# Patient Record
Sex: FEMALE | Race: WHITE | NOT HISPANIC OR LATINO | Employment: OTHER | URBAN - METROPOLITAN AREA
[De-identification: names, ages, dates, MRNs, and addresses within clinical notes are randomized per-mention and may not be internally consistent; named-entity substitution may affect disease eponyms.]

---

## 2017-07-06 ENCOUNTER — TRANSCRIBE ORDERS (OUTPATIENT)
Dept: ADMINISTRATIVE | Facility: HOSPITAL | Age: 67
End: 2017-07-06

## 2017-07-06 DIAGNOSIS — R85.69: Primary | ICD-10-CM

## 2017-07-07 ENCOUNTER — HOSPITAL ENCOUNTER (OUTPATIENT)
Dept: CT IMAGING | Facility: HOSPITAL | Age: 67
Discharge: HOME | End: 2017-07-07
Payer: MEDICARE

## 2017-07-07 ENCOUNTER — APPOINTMENT (OUTPATIENT)
Dept: LAB | Facility: HOSPITAL | Age: 67
End: 2017-07-07
Payer: MEDICARE

## 2017-07-07 ENCOUNTER — TRANSCRIBE ORDERS (OUTPATIENT)
Dept: ADMINISTRATIVE | Facility: HOSPITAL | Age: 67
End: 2017-07-07

## 2017-07-07 DIAGNOSIS — K58.0 IRRITABLE BOWEL SYNDROME WITH DIARRHEA: ICD-10-CM

## 2017-07-07 DIAGNOSIS — R93.5 ABNORMAL FINDINGS ON DIAGNOSTIC IMAGING OF ABDOMEN: ICD-10-CM

## 2017-07-07 DIAGNOSIS — R85.69: ICD-10-CM

## 2017-07-07 DIAGNOSIS — K29.60 REFLUX GASTRITIS: ICD-10-CM

## 2017-07-07 DIAGNOSIS — R19.7 DIARRHEA, UNSPECIFIED TYPE: ICD-10-CM

## 2017-07-07 DIAGNOSIS — R93.5 ABNORMAL FINDINGS ON DIAGNOSTIC IMAGING OF ABDOMEN: Primary | ICD-10-CM

## 2017-07-07 LAB
ALBUMIN SERPL BCP-MCNC: 3.8 G/DL (ref 3.5–5)
ALP SERPL-CCNC: 113 U/L (ref 46–116)
ALT SERPL W P-5'-P-CCNC: 35 U/L (ref 12–78)
ANION GAP SERPL CALCULATED.3IONS-SCNC: 8 MMOL/L (ref 4–13)
AST SERPL W P-5'-P-CCNC: 23 U/L (ref 5–45)
BILIRUB SERPL-MCNC: 0.7 MG/DL (ref 0.2–1)
BUN SERPL-MCNC: 21 MG/DL (ref 5–25)
CALCIUM SERPL-MCNC: 9 MG/DL (ref 8.3–10.1)
CHLORIDE SERPL-SCNC: 104 MMOL/L (ref 100–108)
CO2 SERPL-SCNC: 27 MMOL/L (ref 21–32)
CREAT SERPL-MCNC: 0.77 MG/DL (ref 0.6–1.3)
ERYTHROCYTE [SEDIMENTATION RATE] IN BLOOD: 22 MM/HOUR (ref 0–20)
GFR SERPL CREATININE-BSD FRML MDRD: >60 ML/MIN/1.73SQ M
GLUCOSE P FAST SERPL-MCNC: 100 MG/DL (ref 65–99)
POTASSIUM SERPL-SCNC: 4.2 MMOL/L (ref 3.5–5.3)
PROT SERPL-MCNC: 7.4 G/DL (ref 6.4–8.2)
SODIUM SERPL-SCNC: 139 MMOL/L (ref 136–145)

## 2017-07-07 PROCEDURE — 80053 COMPREHEN METABOLIC PANEL: CPT

## 2017-07-07 PROCEDURE — 85652 RBC SED RATE AUTOMATED: CPT

## 2017-07-07 PROCEDURE — 36415 COLL VENOUS BLD VENIPUNCTURE: CPT

## 2017-07-07 PROCEDURE — 86255 FLUORESCENT ANTIBODY SCREEN: CPT

## 2017-07-07 PROCEDURE — 74177 CT ABD & PELVIS W/CONTRAST: CPT

## 2017-07-07 PROCEDURE — 83516 IMMUNOASSAY NONANTIBODY: CPT

## 2017-07-07 PROCEDURE — 82784 ASSAY IGA/IGD/IGG/IGM EACH: CPT

## 2017-07-07 RX ADMIN — IOHEXOL 100 ML: 350 INJECTION, SOLUTION INTRAVENOUS at 13:21

## 2017-07-07 RX ADMIN — IOHEXOL 50 ML: 240 INJECTION, SOLUTION INTRATHECAL; INTRAVASCULAR; INTRAVENOUS; ORAL at 13:21

## 2017-07-08 LAB
ENDOMYSIUM IGA SER QL: NEGATIVE
GLIADIN PEPTIDE IGA SER-ACNC: 4 UNITS (ref 0–19)
GLIADIN PEPTIDE IGG SER-ACNC: 3 UNITS (ref 0–19)
IGA SERPL-MCNC: 302 MG/DL (ref 87–352)
TTG IGA SER-ACNC: <2 U/ML (ref 0–3)
TTG IGG SER-ACNC: <2 U/ML (ref 0–5)

## 2017-11-15 ENCOUNTER — TRANSCRIBE ORDERS (OUTPATIENT)
Dept: ADMINISTRATIVE | Facility: HOSPITAL | Age: 67
End: 2017-11-15

## 2017-11-15 ENCOUNTER — HOSPITAL ENCOUNTER (OUTPATIENT)
Dept: RADIOLOGY | Facility: HOSPITAL | Age: 67
Discharge: HOME | End: 2017-11-15
Payer: MEDICARE

## 2017-11-15 ENCOUNTER — APPOINTMENT (OUTPATIENT)
Dept: LAB | Facility: HOSPITAL | Age: 67
End: 2017-11-15
Payer: MEDICARE

## 2017-11-15 DIAGNOSIS — R07.9 CHEST PAIN, UNSPECIFIED TYPE: Primary | ICD-10-CM

## 2017-11-15 DIAGNOSIS — Z13.220 SCREENING FOR LIPOID DISORDERS: ICD-10-CM

## 2017-11-15 DIAGNOSIS — R07.9 CHEST PAIN, UNSPECIFIED TYPE: ICD-10-CM

## 2017-11-15 DIAGNOSIS — Z72.89 OTHER PROBLEMS RELATED TO LIFESTYLE: ICD-10-CM

## 2017-11-15 LAB
ALBUMIN SERPL BCP-MCNC: 3.7 G/DL (ref 3.5–5)
ALP SERPL-CCNC: 99 U/L (ref 46–116)
ALT SERPL W P-5'-P-CCNC: 36 U/L (ref 12–78)
ANION GAP SERPL CALCULATED.3IONS-SCNC: 8 MMOL/L (ref 4–13)
AST SERPL W P-5'-P-CCNC: 19 U/L (ref 5–45)
BILIRUB SERPL-MCNC: 0.6 MG/DL (ref 0.2–1)
BUN SERPL-MCNC: 20 MG/DL (ref 5–25)
CALCIUM SERPL-MCNC: 9.3 MG/DL (ref 8.3–10.1)
CHLORIDE SERPL-SCNC: 105 MMOL/L (ref 100–108)
CHOLEST SERPL-MCNC: 165 MG/DL (ref 50–200)
CO2 SERPL-SCNC: 29 MMOL/L (ref 21–32)
CREAT SERPL-MCNC: 0.69 MG/DL (ref 0.6–1.3)
ERYTHROCYTE [DISTWIDTH] IN BLOOD BY AUTOMATED COUNT: 12.6 % (ref 11.6–15.1)
GFR SERPL CREATININE-BSD FRML MDRD: 92 ML/MIN/1.73SQ M
GLUCOSE P FAST SERPL-MCNC: 102 MG/DL (ref 65–99)
HCT VFR BLD AUTO: 42.3 % (ref 34.8–46.1)
HDLC SERPL-MCNC: 68 MG/DL (ref 40–60)
HGB BLD-MCNC: 14 G/DL (ref 11.5–15.4)
LDLC SERPL CALC-MCNC: 85 MG/DL (ref 0–100)
MCH RBC QN AUTO: 30.4 PG (ref 26.8–34.3)
MCHC RBC AUTO-ENTMCNC: 33.1 G/DL (ref 31.4–37.4)
MCV RBC AUTO: 92 FL (ref 82–98)
PLATELET # BLD AUTO: 225 THOUSANDS/UL (ref 149–390)
PMV BLD AUTO: 11.2 FL (ref 8.9–12.7)
POTASSIUM SERPL-SCNC: 4.2 MMOL/L (ref 3.5–5.3)
PROT SERPL-MCNC: 7 G/DL (ref 6.4–8.2)
RBC # BLD AUTO: 4.6 MILLION/UL (ref 3.81–5.12)
SODIUM SERPL-SCNC: 142 MMOL/L (ref 136–145)
TRIGL SERPL-MCNC: 58 MG/DL
WBC # BLD AUTO: 4.92 THOUSAND/UL (ref 4.31–10.16)

## 2017-11-15 PROCEDURE — 80053 COMPREHEN METABOLIC PANEL: CPT

## 2017-11-15 PROCEDURE — 80061 LIPID PANEL: CPT

## 2017-11-15 PROCEDURE — 85027 COMPLETE CBC AUTOMATED: CPT

## 2017-11-15 PROCEDURE — 71020 HB CHEST X-RAY 2VW FRONTAL&LATL: CPT

## 2017-11-15 PROCEDURE — 36415 COLL VENOUS BLD VENIPUNCTURE: CPT

## 2017-11-15 PROCEDURE — 80074 ACUTE HEPATITIS PANEL: CPT

## 2017-11-16 LAB
HAV IGM SER QL: NORMAL
HBV CORE IGM SER QL: NORMAL
HBV SURFACE AG SER QL: NORMAL
HCV AB SER QL: NORMAL

## 2018-03-22 ENCOUNTER — APPOINTMENT (OUTPATIENT)
Dept: RADIOLOGY | Facility: CLINIC | Age: 68
End: 2018-03-22
Payer: MEDICARE

## 2018-03-22 ENCOUNTER — OFFICE VISIT (OUTPATIENT)
Dept: URGENT CARE | Facility: CLINIC | Age: 68
End: 2018-03-22
Payer: MEDICARE

## 2018-03-22 VITALS — OXYGEN SATURATION: 98 % | HEART RATE: 88 BPM | RESPIRATION RATE: 20 BRPM | TEMPERATURE: 98.6 F

## 2018-03-22 DIAGNOSIS — S93.401A SPRAIN OF RIGHT ANKLE, UNSPECIFIED LIGAMENT, INITIAL ENCOUNTER: Primary | ICD-10-CM

## 2018-03-22 DIAGNOSIS — S99.911A INJURY, ANKLE, RIGHT, INITIAL ENCOUNTER: ICD-10-CM

## 2018-03-22 PROCEDURE — G0463 HOSPITAL OUTPT CLINIC VISIT: HCPCS | Performed by: PHYSICIAN ASSISTANT

## 2018-03-22 PROCEDURE — 99213 OFFICE O/P EST LOW 20 MIN: CPT | Performed by: PHYSICIAN ASSISTANT

## 2018-03-22 PROCEDURE — 73610 X-RAY EXAM OF ANKLE: CPT

## 2018-03-22 RX ORDER — ASPIRIN 325 MG
325 TABLET ORAL
COMMUNITY
Start: 2009-10-22 | End: 2021-11-22 | Stop reason: HOSPADM

## 2018-03-22 RX ORDER — BIMATOPROST 0.01 %
DROPS OPHTHALMIC (EYE)
COMMUNITY
Start: 2018-03-12

## 2018-03-22 NOTE — PATIENT INSTRUCTIONS
Xray appears negative for any fracture  Will follow up with radiologist report when available  Recommend elevating body part, icing the area every 2 hours for 20-30 minutes, take Ibuprofen every 6-8 hours to reduce inflammation with food  Patient to follow up ortho in 2-3 days if no improvement with the addition of ice/NSAIDS with food  Patient did verbalize understanding  I did review the radiologist report with patient when it was made available  Discussed that she will need repeat imaging again to rule out fracture  To present to the ER if symptoms worsen

## 2018-03-22 NOTE — PROGRESS NOTES
[address] [facility]  ([facility] [phone]  (fax) [phone]        NAME: Yan Griffin is a 74 y o  female  : 1950    MRN: [id number]  DATE: 2018  TIME: 3:09 PM    Assessment and Plan   Sprain of right ankle, unspecified ligament, initial encounter [S93 401A]  1  Sprain of right ankle, unspecified ligament, initial encounter     2  Injury, ankle, right, initial encounter  XR ankle 3+ vw right         Patient Instructions   Xray appears negative for any fracture  Will follow up with radiologist report when available  Recommend elevating body part, icing the area every 2 hours for 20-30 minutes, take Ibuprofen every 6-8 hours to reduce inflammation with food  Patient to follow up ortho in 2-3 days if no improvement with the addition of ice/NSAIDS with food  Patient did verbalize understanding  I did review the radiologist report with patient when it was made available  Discussed that she will need repeat imaging again to rule out fracture  Given numbers of several orthopedic doctors  To present to the ER if symptoms worsen  Chief Complaint     Chief Complaint   Patient presents with    Ankle Injury     Vernard Fix down steps 2 weeks ago c/o right ankle pain and swelling  Bijan Villarreal LPN          History of Present Illness   Yan Griffin presents to the clinic c/o    Ankle Injury    The incident occurred more than 1 week ago  The incident occurred at home  The injury mechanism was a fall  The pain is present in the right ankle  The pain is moderate  Pertinent negatives include no muscle weakness, numbness or tingling  She reports no foreign bodies present  The symptoms are aggravated by movement  Review of Systems   Review of Systems   Constitutional: Negative for activity change, appetite change, chills, diaphoresis, fatigue and fever     HENT: Negative for congestion, ear discharge, ear pain, facial swelling, rhinorrhea, sinus pain, sinus pressure, sneezing and sore throat  Eyes: Negative for photophobia, pain, discharge, redness, itching and visual disturbance  Respiratory: Negative for apnea, cough, chest tightness, shortness of breath and wheezing  Cardiovascular: Negative for chest pain  Gastrointestinal: Negative for abdominal distention, abdominal pain, anal bleeding, blood in stool, constipation, diarrhea, nausea and vomiting  Genitourinary: Negative for dysuria, flank pain, frequency, hematuria and urgency  Musculoskeletal: Positive for arthralgias and joint swelling  Negative for back pain, gait problem, myalgias, neck pain and neck stiffness  Skin: Negative for color change, rash and wound  Allergic/Immunologic: Negative for immunocompromised state  Neurological: Negative for dizziness, tingling, facial asymmetry, numbness and headaches  Hematological: Negative for adenopathy  Psychiatric/Behavioral: Negative for confusion and decreased concentration           Current Medications     Long-Term Prescriptions   Medication Sig Dispense Refill    DEXILANT 60 MG capsule       LUMIGAN 0 01 % ophthalmic drops          Current Allergies     Allergies as of 03/22/2018 - Reviewed 03/22/2018   Allergen Reaction Noted    Percocet [oxycodone-acetaminophen] GI Intolerance 07/20/2016    Percocet [oxycodone-acetaminophen] GI Intolerance 03/22/2018    Sulfa antibiotics  07/20/2016            The following portions of the patient's history were reviewed and updated as appropriate: allergies, current medications, past family history, past medical history, past social history, past surgical history and problem list     Past Medical History:   Diagnosis Date    Encephalitis     History of blood clot in brain      Past Surgical History:   Procedure Laterality Date    CHOLECYSTECTOMY      CYST REMOVAL Right     wrist    HEMORROIDECTOMY       Objective   Pulse 88   Temp 98 6 °F (37 °C)   Resp 20   SpO2 98%        Physical Exam     Physical Exam   Constitutional: She is oriented to person, place, and time  She appears well-developed and well-nourished  No distress  HENT:   Head: Normocephalic and atraumatic  Right Ear: Tympanic membrane and external ear normal    Left Ear: Tympanic membrane and external ear normal    Nose: Nose normal    Mouth/Throat: Oropharynx is clear and moist  No oropharyngeal exudate  Eyes: Conjunctivae and EOM are normal  Pupils are equal, round, and reactive to light  Right eye exhibits no discharge  Left eye exhibits no discharge  No scleral icterus  Neck: Normal range of motion  Neck supple  No JVD present  No tracheal deviation present  No thyromegaly present  Cardiovascular: Normal rate, regular rhythm and normal heart sounds  Exam reveals no gallop and no friction rub  No murmur heard  Pulmonary/Chest: Effort normal and breath sounds normal  No stridor  No respiratory distress  She has no wheezes  She has no rales  She exhibits no tenderness  Abdominal: Soft  Bowel sounds are normal  She exhibits no distension and no mass  There is no tenderness  There is no rebound and no guarding  Musculoskeletal: She exhibits edema and tenderness  She exhibits no deformity  Right foot: There is tenderness (right lateral malleolus ) and swelling (right lateral ankle)  There is no deformity and no laceration  Neurovascularly intact  Lymphadenopathy:     She has no cervical adenopathy  Neurological: She is alert and oriented to person, place, and time  She has normal reflexes  Coordination normal    Skin: Skin is warm and dry  No rash noted  She is not diaphoretic  No erythema  No pallor  Psychiatric: She has a normal mood and affect  Her behavior is normal  Judgment and thought content normal    Nursing note and vitals reviewed        Delmy Merrill PA-C

## 2019-02-01 ENCOUNTER — TRANSCRIBE ORDERS (OUTPATIENT)
Dept: ADMINISTRATIVE | Facility: HOSPITAL | Age: 69
End: 2019-02-01

## 2019-02-01 ENCOUNTER — APPOINTMENT (OUTPATIENT)
Dept: LAB | Facility: HOSPITAL | Age: 69
End: 2019-02-01

## 2019-02-01 DIAGNOSIS — Z01.84 IMMUNITY STATUS TESTING: ICD-10-CM

## 2019-02-01 DIAGNOSIS — Z01.84 IMMUNITY STATUS TESTING: Primary | ICD-10-CM

## 2019-02-01 LAB — RUBV IGG SERPL IA-ACNC: >175 IU/ML

## 2019-02-01 PROCEDURE — 86787 VARICELLA-ZOSTER ANTIBODY: CPT

## 2019-02-01 PROCEDURE — 36415 COLL VENOUS BLD VENIPUNCTURE: CPT

## 2019-02-01 PROCEDURE — 86765 RUBEOLA ANTIBODY: CPT

## 2019-02-01 PROCEDURE — 86480 TB TEST CELL IMMUN MEASURE: CPT

## 2019-02-01 PROCEDURE — 86735 MUMPS ANTIBODY: CPT

## 2019-02-01 PROCEDURE — 86762 RUBELLA ANTIBODY: CPT

## 2019-02-04 LAB
GAMMA INTERFERON BACKGROUND BLD IA-ACNC: 0.04 IU/ML
M TB IFN-G BLD-IMP: NEGATIVE
M TB IFN-G CD4+ BCKGRND COR BLD-ACNC: -0.01 IU/ML
M TB IFN-G CD4+ BCKGRND COR BLD-ACNC: -0.01 IU/ML
MITOGEN IGNF BCKGRD COR BLD-ACNC: >10 IU/ML
VZV IGG SER IA-ACNC: NORMAL

## 2019-02-05 LAB
MEV IGG SER QL: NORMAL
MUV IGG SER QL: NORMAL

## 2020-01-10 ENCOUNTER — APPOINTMENT (OUTPATIENT)
Dept: LAB | Facility: CLINIC | Age: 70
End: 2020-01-10
Payer: MEDICARE

## 2020-01-10 ENCOUNTER — TRANSCRIBE ORDERS (OUTPATIENT)
Dept: LAB | Facility: CLINIC | Age: 70
End: 2020-01-10

## 2020-01-10 DIAGNOSIS — Z13.9 SCREENING FOR UNSPECIFIED CONDITION: Primary | ICD-10-CM

## 2020-01-10 DIAGNOSIS — Z13.220 SCREENING FOR LIPOID DISORDERS: ICD-10-CM

## 2020-01-10 DIAGNOSIS — Z13.9 SCREENING FOR UNSPECIFIED CONDITION: ICD-10-CM

## 2020-01-10 LAB
ALBUMIN SERPL BCP-MCNC: 3.7 G/DL (ref 3.5–5)
ALP SERPL-CCNC: 98 U/L (ref 46–116)
ALT SERPL W P-5'-P-CCNC: 27 U/L (ref 12–78)
ANION GAP SERPL CALCULATED.3IONS-SCNC: 5 MMOL/L (ref 4–13)
AST SERPL W P-5'-P-CCNC: 15 U/L (ref 5–45)
BILIRUB SERPL-MCNC: 0.9 MG/DL (ref 0.2–1)
BUN SERPL-MCNC: 21 MG/DL (ref 5–25)
CALCIUM SERPL-MCNC: 9 MG/DL (ref 8.3–10.1)
CHLORIDE SERPL-SCNC: 108 MMOL/L (ref 100–108)
CHOLEST SERPL-MCNC: 176 MG/DL (ref 50–200)
CO2 SERPL-SCNC: 28 MMOL/L (ref 21–32)
CREAT SERPL-MCNC: 0.86 MG/DL (ref 0.6–1.3)
ERYTHROCYTE [DISTWIDTH] IN BLOOD BY AUTOMATED COUNT: 12.4 % (ref 11.6–15.1)
GFR SERPL CREATININE-BSD FRML MDRD: 70 ML/MIN/1.73SQ M
GLUCOSE P FAST SERPL-MCNC: 114 MG/DL (ref 65–99)
HCT VFR BLD AUTO: 42.4 % (ref 34.8–46.1)
HDLC SERPL-MCNC: 69 MG/DL
HGB BLD-MCNC: 14 G/DL (ref 11.5–15.4)
LDLC SERPL CALC-MCNC: 92 MG/DL (ref 0–100)
MCH RBC QN AUTO: 31.2 PG (ref 26.8–34.3)
MCHC RBC AUTO-ENTMCNC: 33 G/DL (ref 31.4–37.4)
MCV RBC AUTO: 94 FL (ref 82–98)
NONHDLC SERPL-MCNC: 107 MG/DL
PLATELET # BLD AUTO: 232 THOUSANDS/UL (ref 149–390)
PMV BLD AUTO: 11.6 FL (ref 8.9–12.7)
POTASSIUM SERPL-SCNC: 3.9 MMOL/L (ref 3.5–5.3)
PROT SERPL-MCNC: 7.4 G/DL (ref 6.4–8.2)
RBC # BLD AUTO: 4.49 MILLION/UL (ref 3.81–5.12)
SODIUM SERPL-SCNC: 141 MMOL/L (ref 136–145)
TRIGL SERPL-MCNC: 77 MG/DL
WBC # BLD AUTO: 5.85 THOUSAND/UL (ref 4.31–10.16)

## 2020-01-10 PROCEDURE — 85027 COMPLETE CBC AUTOMATED: CPT

## 2020-01-10 PROCEDURE — 36415 COLL VENOUS BLD VENIPUNCTURE: CPT

## 2020-01-10 PROCEDURE — 80061 LIPID PANEL: CPT

## 2020-01-10 PROCEDURE — 80053 COMPREHEN METABOLIC PANEL: CPT

## 2021-02-23 ENCOUNTER — TRANSCRIBE ORDERS (OUTPATIENT)
Dept: ADMINISTRATIVE | Facility: HOSPITAL | Age: 71
End: 2021-02-23

## 2021-02-23 ENCOUNTER — LAB (OUTPATIENT)
Dept: LAB | Facility: HOSPITAL | Age: 71
End: 2021-02-23
Attending: FAMILY MEDICINE
Payer: MEDICARE

## 2021-02-23 DIAGNOSIS — E11.9 DIABETES MELLITUS WITHOUT COMPLICATION (HCC): ICD-10-CM

## 2021-02-23 DIAGNOSIS — Z13.220 SCREENING FOR LIPOID DISORDERS: ICD-10-CM

## 2021-02-23 DIAGNOSIS — E11.9 DIABETES MELLITUS WITHOUT COMPLICATION (HCC): Primary | ICD-10-CM

## 2021-02-23 LAB
ALBUMIN SERPL BCP-MCNC: 3.6 G/DL (ref 3.5–5)
ALP SERPL-CCNC: 107 U/L (ref 46–116)
ALT SERPL W P-5'-P-CCNC: 28 U/L (ref 12–78)
ANION GAP SERPL CALCULATED.3IONS-SCNC: 6 MMOL/L (ref 4–13)
AST SERPL W P-5'-P-CCNC: 17 U/L (ref 5–45)
BILIRUB SERPL-MCNC: 0.8 MG/DL (ref 0.2–1)
BUN SERPL-MCNC: 16 MG/DL (ref 5–25)
CALCIUM SERPL-MCNC: 9.6 MG/DL (ref 8.3–10.1)
CHLORIDE SERPL-SCNC: 105 MMOL/L (ref 100–108)
CHOLEST SERPL-MCNC: 183 MG/DL (ref 50–200)
CO2 SERPL-SCNC: 29 MMOL/L (ref 21–32)
CREAT SERPL-MCNC: 0.79 MG/DL (ref 0.6–1.3)
ERYTHROCYTE [DISTWIDTH] IN BLOOD BY AUTOMATED COUNT: 12.4 % (ref 11.6–15.1)
GFR SERPL CREATININE-BSD FRML MDRD: 77 ML/MIN/1.73SQ M
GLUCOSE P FAST SERPL-MCNC: 108 MG/DL (ref 65–99)
HCT VFR BLD AUTO: 42.6 % (ref 34.8–46.1)
HDLC SERPL-MCNC: 78 MG/DL
HGB BLD-MCNC: 14 G/DL (ref 11.5–15.4)
LDLC SERPL CALC-MCNC: 92 MG/DL (ref 0–100)
MAGNESIUM SERPL-MCNC: 2.1 MG/DL (ref 1.6–2.6)
MCH RBC QN AUTO: 30.8 PG (ref 26.8–34.3)
MCHC RBC AUTO-ENTMCNC: 32.9 G/DL (ref 31.4–37.4)
MCV RBC AUTO: 94 FL (ref 82–98)
NONHDLC SERPL-MCNC: 105 MG/DL
PLATELET # BLD AUTO: 230 THOUSANDS/UL (ref 149–390)
PMV BLD AUTO: 10.7 FL (ref 8.9–12.7)
POTASSIUM SERPL-SCNC: 4.3 MMOL/L (ref 3.5–5.3)
PROT SERPL-MCNC: 7.6 G/DL (ref 6.4–8.2)
RBC # BLD AUTO: 4.55 MILLION/UL (ref 3.81–5.12)
SODIUM SERPL-SCNC: 140 MMOL/L (ref 136–145)
TRIGL SERPL-MCNC: 64 MG/DL
WBC # BLD AUTO: 5.21 THOUSAND/UL (ref 4.31–10.16)

## 2021-02-23 PROCEDURE — 80061 LIPID PANEL: CPT

## 2021-02-23 PROCEDURE — 80053 COMPREHEN METABOLIC PANEL: CPT

## 2021-02-23 PROCEDURE — 36415 COLL VENOUS BLD VENIPUNCTURE: CPT

## 2021-02-23 PROCEDURE — 83735 ASSAY OF MAGNESIUM: CPT

## 2021-02-23 PROCEDURE — 85027 COMPLETE CBC AUTOMATED: CPT

## 2021-03-12 ENCOUNTER — IMMUNIZATIONS (OUTPATIENT)
Dept: FAMILY MEDICINE CLINIC | Facility: HOSPITAL | Age: 71
End: 2021-03-12

## 2021-03-12 DIAGNOSIS — Z23 ENCOUNTER FOR IMMUNIZATION: Primary | ICD-10-CM

## 2021-03-12 PROCEDURE — 91301 SARS-COV-2 / COVID-19 MRNA VACCINE (MODERNA) 100 MCG: CPT

## 2021-03-12 PROCEDURE — 0011A SARS-COV-2 / COVID-19 MRNA VACCINE (MODERNA) 100 MCG: CPT

## 2021-04-14 ENCOUNTER — IMMUNIZATIONS (OUTPATIENT)
Dept: FAMILY MEDICINE CLINIC | Facility: HOSPITAL | Age: 71
End: 2021-04-14

## 2021-04-14 DIAGNOSIS — Z23 ENCOUNTER FOR IMMUNIZATION: Primary | ICD-10-CM

## 2021-04-14 PROCEDURE — 91301 SARS-COV-2 / COVID-19 MRNA VACCINE (MODERNA) 100 MCG: CPT

## 2021-04-14 PROCEDURE — 0012A SARS-COV-2 / COVID-19 MRNA VACCINE (MODERNA) 100 MCG: CPT

## 2021-10-12 ENCOUNTER — APPOINTMENT (OUTPATIENT)
Dept: LAB | Facility: CLINIC | Age: 71
End: 2021-10-12
Payer: MEDICARE

## 2021-10-12 DIAGNOSIS — Z13.220 SCREENING FOR LIPOID DISORDERS: ICD-10-CM

## 2021-10-12 DIAGNOSIS — K21.9 GASTROESOPHAGEAL REFLUX DISEASE WITHOUT ESOPHAGITIS: ICD-10-CM

## 2021-10-12 LAB
ALBUMIN SERPL BCP-MCNC: 3.4 G/DL (ref 3.5–5)
ALP SERPL-CCNC: 106 U/L (ref 46–116)
ALT SERPL W P-5'-P-CCNC: 28 U/L (ref 12–78)
ANION GAP SERPL CALCULATED.3IONS-SCNC: 6 MMOL/L (ref 4–13)
AST SERPL W P-5'-P-CCNC: 19 U/L (ref 5–45)
BILIRUB SERPL-MCNC: 0.6 MG/DL (ref 0.2–1)
BUN SERPL-MCNC: 20 MG/DL (ref 5–25)
CALCIUM ALBUM COR SERPL-MCNC: 9.9 MG/DL (ref 8.3–10.1)
CALCIUM SERPL-MCNC: 9.4 MG/DL (ref 8.3–10.1)
CHLORIDE SERPL-SCNC: 106 MMOL/L (ref 100–108)
CHOLEST SERPL-MCNC: 173 MG/DL (ref 50–200)
CO2 SERPL-SCNC: 26 MMOL/L (ref 21–32)
CREAT SERPL-MCNC: 0.78 MG/DL (ref 0.6–1.3)
ERYTHROCYTE [DISTWIDTH] IN BLOOD BY AUTOMATED COUNT: 12.8 % (ref 11.6–15.1)
GFR SERPL CREATININE-BSD FRML MDRD: 78 ML/MIN/1.73SQ M
GLUCOSE P FAST SERPL-MCNC: 102 MG/DL (ref 65–99)
HCT VFR BLD AUTO: 43 % (ref 34.8–46.1)
HDLC SERPL-MCNC: 70 MG/DL
HGB BLD-MCNC: 14.1 G/DL (ref 11.5–15.4)
LDLC SERPL CALC-MCNC: 89 MG/DL (ref 0–100)
MAGNESIUM SERPL-MCNC: 2.3 MG/DL (ref 1.6–2.6)
MCH RBC QN AUTO: 31.4 PG (ref 26.8–34.3)
MCHC RBC AUTO-ENTMCNC: 32.8 G/DL (ref 31.4–37.4)
MCV RBC AUTO: 96 FL (ref 82–98)
NONHDLC SERPL-MCNC: 103 MG/DL
PLATELET # BLD AUTO: 236 THOUSANDS/UL (ref 149–390)
PMV BLD AUTO: 11.7 FL (ref 8.9–12.7)
POTASSIUM SERPL-SCNC: 3.8 MMOL/L (ref 3.5–5.3)
PROT SERPL-MCNC: 7.4 G/DL (ref 6.4–8.2)
RBC # BLD AUTO: 4.49 MILLION/UL (ref 3.81–5.12)
SODIUM SERPL-SCNC: 138 MMOL/L (ref 136–145)
TRIGL SERPL-MCNC: 72 MG/DL
WBC # BLD AUTO: 6.4 THOUSAND/UL (ref 4.31–10.16)

## 2021-10-12 PROCEDURE — 83735 ASSAY OF MAGNESIUM: CPT

## 2021-10-12 PROCEDURE — 36415 COLL VENOUS BLD VENIPUNCTURE: CPT

## 2021-10-12 PROCEDURE — 80053 COMPREHEN METABOLIC PANEL: CPT

## 2021-10-12 PROCEDURE — 85027 COMPLETE CBC AUTOMATED: CPT

## 2021-10-12 PROCEDURE — 80061 LIPID PANEL: CPT

## 2021-11-20 ENCOUNTER — APPOINTMENT (OUTPATIENT)
Dept: RADIOLOGY | Facility: CLINIC | Age: 71
End: 2021-11-20
Payer: MEDICARE

## 2021-11-20 ENCOUNTER — OFFICE VISIT (OUTPATIENT)
Dept: URGENT CARE | Facility: CLINIC | Age: 71
End: 2021-11-20
Payer: MEDICARE

## 2021-11-20 VITALS
WEIGHT: 215 LBS | HEIGHT: 64 IN | RESPIRATION RATE: 18 BRPM | DIASTOLIC BLOOD PRESSURE: 66 MMHG | SYSTOLIC BLOOD PRESSURE: 143 MMHG | TEMPERATURE: 97.8 F | OXYGEN SATURATION: 96 % | HEART RATE: 103 BPM | BODY MASS INDEX: 36.7 KG/M2

## 2021-11-20 DIAGNOSIS — R09.1 PLEURISY: Primary | ICD-10-CM

## 2021-11-20 DIAGNOSIS — R05.9 COUGH: ICD-10-CM

## 2021-11-20 DIAGNOSIS — M54.50 ACUTE BILATERAL LOW BACK PAIN WITHOUT SCIATICA: ICD-10-CM

## 2021-11-20 DIAGNOSIS — M79.10 MYALGIA: ICD-10-CM

## 2021-11-20 PROCEDURE — G0463 HOSPITAL OUTPT CLINIC VISIT: HCPCS | Performed by: PHYSICIAN ASSISTANT

## 2021-11-20 PROCEDURE — U0003 INFECTIOUS AGENT DETECTION BY NUCLEIC ACID (DNA OR RNA); SEVERE ACUTE RESPIRATORY SYNDROME CORONAVIRUS 2 (SARS-COV-2) (CORONAVIRUS DISEASE [COVID-19]), AMPLIFIED PROBE TECHNIQUE, MAKING USE OF HIGH THROUGHPUT TECHNOLOGIES AS DESCRIBED BY CMS-2020-01-R: HCPCS | Performed by: PHYSICIAN ASSISTANT

## 2021-11-20 PROCEDURE — 99213 OFFICE O/P EST LOW 20 MIN: CPT | Performed by: PHYSICIAN ASSISTANT

## 2021-11-20 PROCEDURE — 71046 X-RAY EXAM CHEST 2 VIEWS: CPT

## 2021-11-20 PROCEDURE — 93005 ELECTROCARDIOGRAM TRACING: CPT | Performed by: PHYSICIAN ASSISTANT

## 2021-11-20 PROCEDURE — U0005 INFEC AGEN DETEC AMPLI PROBE: HCPCS | Performed by: PHYSICIAN ASSISTANT

## 2021-11-20 RX ORDER — METHOCARBAMOL 500 MG/1
500 TABLET, FILM COATED ORAL 3 TIMES DAILY
Qty: 30 TABLET | Refills: 0 | Status: SHIPPED | OUTPATIENT
Start: 2021-11-20 | End: 2021-11-20

## 2021-11-20 RX ORDER — METHOCARBAMOL 500 MG/1
TABLET, FILM COATED ORAL
Qty: 30 TABLET | Refills: 0 | Status: SHIPPED | OUTPATIENT
Start: 2021-11-20 | End: 2021-11-20

## 2021-11-20 RX ORDER — CYCLOBENZAPRINE HCL 5 MG
5 TABLET ORAL 3 TIMES DAILY PRN
Qty: 30 TABLET | Refills: 0 | Status: SHIPPED | OUTPATIENT
Start: 2021-11-20 | End: 2021-11-22 | Stop reason: HOSPADM

## 2021-11-21 ENCOUNTER — HOSPITAL ENCOUNTER (INPATIENT)
Facility: HOSPITAL | Age: 71
LOS: 1 days | Discharge: HOME | DRG: 176 | End: 2021-11-22
Attending: INTERNAL MEDICINE | Admitting: INTERNAL MEDICINE
Payer: MEDICARE

## 2021-11-21 ENCOUNTER — HOSPITAL ENCOUNTER (EMERGENCY)
Facility: HOSPITAL | Age: 71
End: 2021-11-21
Attending: EMERGENCY MEDICINE
Payer: MEDICARE

## 2021-11-21 ENCOUNTER — APPOINTMENT (EMERGENCY)
Dept: CT IMAGING | Facility: HOSPITAL | Age: 71
End: 2021-11-21
Payer: MEDICARE

## 2021-11-21 VITALS
RESPIRATION RATE: 20 BRPM | OXYGEN SATURATION: 96 % | BODY MASS INDEX: 36.7 KG/M2 | DIASTOLIC BLOOD PRESSURE: 99 MMHG | SYSTOLIC BLOOD PRESSURE: 125 MMHG | HEART RATE: 98 BPM | TEMPERATURE: 98.6 F | WEIGHT: 215 LBS | HEIGHT: 64 IN

## 2021-11-21 DIAGNOSIS — I26.92 ACUTE SADDLE PULMONARY EMBOLISM WITHOUT ACUTE COR PULMONALE (HCC): Primary | ICD-10-CM

## 2021-11-21 DIAGNOSIS — I26.99 PULMONARY EMBOLISM AND INFARCTION (HCC): ICD-10-CM

## 2021-11-21 DIAGNOSIS — I26.92 SADDLE PULMONARY EMBOLUS (HCC): Primary | ICD-10-CM

## 2021-11-21 DIAGNOSIS — M54.6 ACUTE RIGHT-SIDED THORACIC BACK PAIN: ICD-10-CM

## 2021-11-21 LAB
2HR DELTA HS TROPONIN: 2 NG/L
ALBUMIN SERPL BCP-MCNC: 3.9 G/DL (ref 3.5–5)
ALP SERPL-CCNC: 110 U/L (ref 46–116)
ALT SERPL W P-5'-P-CCNC: 25 U/L (ref 12–78)
ANION GAP SERPL CALCULATED.3IONS-SCNC: 7 MMOL/L (ref 4–13)
APTT PPP: 29 SECONDS (ref 23–37)
AST SERPL W P-5'-P-CCNC: 20 U/L (ref 5–45)
BACTERIA UR QL AUTO: NORMAL /HPF
BASOPHILS # BLD AUTO: 0.02 THOUSANDS/ΜL (ref 0–0.1)
BASOPHILS NFR BLD AUTO: 0 % (ref 0–1)
BILIRUB SERPL-MCNC: 0.85 MG/DL (ref 0.2–1)
BILIRUB UR QL STRIP: NEGATIVE
BUN SERPL-MCNC: 23 MG/DL (ref 5–25)
CALCIUM SERPL-MCNC: 9.6 MG/DL (ref 8.3–10.1)
CARDIAC TROPONIN I PNL SERPL HS: 2 NG/L
CARDIAC TROPONIN I PNL SERPL HS: 4 NG/L
CHLORIDE SERPL-SCNC: 102 MMOL/L (ref 100–108)
CLARITY UR: CLEAR
CO2 SERPL-SCNC: 30 MMOL/L (ref 21–32)
COLOR UR: YELLOW
CREAT SERPL-MCNC: 0.89 MG/DL (ref 0.6–1.3)
D DIMER PPP FEU-MCNC: 9.42 UG/ML FEU
EOSINOPHIL # BLD AUTO: 0.08 THOUSAND/ΜL (ref 0–0.61)
EOSINOPHIL NFR BLD AUTO: 1 % (ref 0–6)
ERYTHROCYTE [DISTWIDTH] IN BLOOD BY AUTOMATED COUNT: 12.2 % (ref 11.6–15.1)
GFR SERPL CREATININE-BSD FRML MDRD: 66 ML/MIN/1.73SQ M
GLUCOSE SERPL-MCNC: 94 MG/DL (ref 65–140)
GLUCOSE UR STRIP-MCNC: NEGATIVE MG/DL
HCT VFR BLD AUTO: 45 % (ref 34.8–46.1)
HGB BLD-MCNC: 14.9 G/DL (ref 11.5–15.4)
HGB UR QL STRIP.AUTO: NEGATIVE
IMM GRANULOCYTES # BLD AUTO: 0.01 THOUSAND/UL (ref 0–0.2)
IMM GRANULOCYTES NFR BLD AUTO: 0 % (ref 0–2)
INR PPP: 1.03 (ref 0.84–1.19)
KETONES UR STRIP-MCNC: ABNORMAL MG/DL
LEUKOCYTE ESTERASE UR QL STRIP: ABNORMAL
LIPASE SERPL-CCNC: 140 U/L (ref 73–393)
LYMPHOCYTES # BLD AUTO: 1.05 THOUSANDS/ΜL (ref 0.6–4.47)
LYMPHOCYTES NFR BLD AUTO: 12 % (ref 14–44)
MAGNESIUM SERPL-MCNC: 2.2 MG/DL (ref 1.6–2.6)
MCH RBC QN AUTO: 31 PG (ref 26.8–34.3)
MCHC RBC AUTO-ENTMCNC: 33.1 G/DL (ref 31.4–37.4)
MCV RBC AUTO: 94 FL (ref 82–98)
MONOCYTES # BLD AUTO: 0.85 THOUSAND/ΜL (ref 0.17–1.22)
MONOCYTES NFR BLD AUTO: 10 % (ref 4–12)
NEUTROPHILS # BLD AUTO: 6.71 THOUSANDS/ΜL (ref 1.85–7.62)
NEUTS SEG NFR BLD AUTO: 77 % (ref 43–75)
NITRITE UR QL STRIP: NEGATIVE
NON-SQ EPI CELLS URNS QL MICRO: NORMAL /HPF
NRBC BLD AUTO-RTO: 0 /100 WBCS
NT-PROBNP SERPL-MCNC: 50 PG/ML
PH UR STRIP.AUTO: 6 [PH]
PLATELET # BLD AUTO: 204 THOUSANDS/UL (ref 149–390)
PMV BLD AUTO: 11.2 FL (ref 8.9–12.7)
POTASSIUM SERPL-SCNC: 3.8 MMOL/L (ref 3.5–5.3)
PROT SERPL-MCNC: 8.2 G/DL (ref 6.4–8.2)
PROT UR STRIP-MCNC: NEGATIVE MG/DL
PROTHROMBIN TIME: 13 SECONDS (ref 11.6–14.5)
RBC # BLD AUTO: 4.8 MILLION/UL (ref 3.81–5.12)
RBC #/AREA URNS AUTO: NORMAL /HPF
SARS-COV-2 RNA RESP QL NAA+PROBE: NEGATIVE
SODIUM SERPL-SCNC: 139 MMOL/L (ref 136–145)
SP GR UR STRIP.AUTO: <=1.005 (ref 1–1.03)
UROBILINOGEN UR QL STRIP.AUTO: 0.2 E.U./DL
WBC # BLD AUTO: 8.72 THOUSAND/UL (ref 4.31–10.16)
WBC #/AREA URNS AUTO: NORMAL /HPF

## 2021-11-21 PROCEDURE — 74177 CT ABD & PELVIS W/CONTRAST: CPT

## 2021-11-21 PROCEDURE — 80053 COMPREHEN METABOLIC PANEL: CPT | Performed by: PHYSICIAN ASSISTANT

## 2021-11-21 PROCEDURE — 85610 PROTHROMBIN TIME: CPT | Performed by: PHYSICIAN ASSISTANT

## 2021-11-21 PROCEDURE — 86146 BETA-2 GLYCOPROTEIN ANTIBODY: CPT | Performed by: INTERNAL MEDICINE

## 2021-11-21 PROCEDURE — 85670 THROMBIN TIME PLASMA: CPT | Performed by: INTERNAL MEDICINE

## 2021-11-21 PROCEDURE — 85300 ANTITHROMBIN III ACTIVITY: CPT | Performed by: INTERNAL MEDICINE

## 2021-11-21 PROCEDURE — 85306 CLOT INHIBIT PROT S FREE: CPT | Performed by: INTERNAL MEDICINE

## 2021-11-21 PROCEDURE — 81241 F5 GENE: CPT | Performed by: INTERNAL MEDICINE

## 2021-11-21 PROCEDURE — 85730 THROMBOPLASTIN TIME PARTIAL: CPT | Performed by: PHYSICIAN ASSISTANT

## 2021-11-21 PROCEDURE — 85305 CLOT INHIBIT PROT S TOTAL: CPT | Performed by: INTERNAL MEDICINE

## 2021-11-21 PROCEDURE — 83735 ASSAY OF MAGNESIUM: CPT | Performed by: PHYSICIAN ASSISTANT

## 2021-11-21 PROCEDURE — 83880 ASSAY OF NATRIURETIC PEPTIDE: CPT | Performed by: STUDENT IN AN ORGANIZED HEALTH CARE EDUCATION/TRAINING PROGRAM

## 2021-11-21 PROCEDURE — 96374 THER/PROPH/DIAG INJ IV PUSH: CPT

## 2021-11-21 PROCEDURE — 85025 COMPLETE CBC W/AUTO DIFF WBC: CPT | Performed by: PHYSICIAN ASSISTANT

## 2021-11-21 PROCEDURE — 85613 RUSSELL VIPER VENOM DILUTED: CPT | Performed by: INTERNAL MEDICINE

## 2021-11-21 PROCEDURE — 93005 ELECTROCARDIOGRAM TRACING: CPT | Performed by: PHYSICIAN ASSISTANT

## 2021-11-21 PROCEDURE — 96376 TX/PRO/DX INJ SAME DRUG ADON: CPT

## 2021-11-21 PROCEDURE — 99223 1ST HOSP IP/OBS HIGH 75: CPT | Performed by: INTERNAL MEDICINE

## 2021-11-21 PROCEDURE — 96366 THER/PROPH/DIAG IV INF ADDON: CPT

## 2021-11-21 PROCEDURE — 85730 THROMBOPLASTIN TIME PARTIAL: CPT | Performed by: INTERNAL MEDICINE

## 2021-11-21 PROCEDURE — 96361 HYDRATE IV INFUSION ADD-ON: CPT

## 2021-11-21 PROCEDURE — 85705 THROMBOPLASTIN INHIBITION: CPT | Performed by: INTERNAL MEDICINE

## 2021-11-21 PROCEDURE — 1124F ACP DISCUSS-NO DSCNMKR DOCD: CPT | Performed by: INTERNAL MEDICINE

## 2021-11-21 PROCEDURE — 85732 THROMBOPLASTIN TIME PARTIAL: CPT | Performed by: INTERNAL MEDICINE

## 2021-11-21 PROCEDURE — 86147 CARDIOLIPIN ANTIBODY EA IG: CPT | Performed by: INTERNAL MEDICINE

## 2021-11-21 PROCEDURE — 83880 ASSAY OF NATRIURETIC PEPTIDE: CPT | Performed by: PHYSICIAN ASSISTANT

## 2021-11-21 PROCEDURE — 99285 EMERGENCY DEPT VISIT HI MDM: CPT

## 2021-11-21 PROCEDURE — 85303 CLOT INHIBIT PROT C ACTIVITY: CPT | Performed by: INTERNAL MEDICINE

## 2021-11-21 PROCEDURE — 84484 ASSAY OF TROPONIN QUANT: CPT | Performed by: PHYSICIAN ASSISTANT

## 2021-11-21 PROCEDURE — 36415 COLL VENOUS BLD VENIPUNCTURE: CPT | Performed by: PHYSICIAN ASSISTANT

## 2021-11-21 PROCEDURE — 96365 THER/PROPH/DIAG IV INF INIT: CPT

## 2021-11-21 PROCEDURE — 99285 EMERGENCY DEPT VISIT HI MDM: CPT | Performed by: PHYSICIAN ASSISTANT

## 2021-11-21 PROCEDURE — 81240 F2 GENE: CPT | Performed by: INTERNAL MEDICINE

## 2021-11-21 PROCEDURE — 71275 CT ANGIOGRAPHY CHEST: CPT

## 2021-11-21 PROCEDURE — 83690 ASSAY OF LIPASE: CPT | Performed by: PHYSICIAN ASSISTANT

## 2021-11-21 PROCEDURE — 85379 FIBRIN DEGRADATION QUANT: CPT | Performed by: PHYSICIAN ASSISTANT

## 2021-11-21 PROCEDURE — 81001 URINALYSIS AUTO W/SCOPE: CPT | Performed by: PHYSICIAN ASSISTANT

## 2021-11-21 PROCEDURE — 96375 TX/PRO/DX INJ NEW DRUG ADDON: CPT

## 2021-11-21 RX ORDER — HEPARIN SODIUM 1000 [USP'U]/ML
7600 INJECTION, SOLUTION INTRAVENOUS; SUBCUTANEOUS ONCE
Status: COMPLETED | OUTPATIENT
Start: 2021-11-21 | End: 2021-11-21

## 2021-11-21 RX ORDER — OXYCODONE HYDROCHLORIDE 5 MG/1
5 TABLET ORAL EVERY 6 HOURS PRN
Status: DISCONTINUED | OUTPATIENT
Start: 2021-11-21 | End: 2021-11-22 | Stop reason: HOSPADM

## 2021-11-21 RX ORDER — HYDROMORPHONE HCL/PF 1 MG/ML
0.5 SYRINGE (ML) INJECTION ONCE
Status: COMPLETED | OUTPATIENT
Start: 2021-11-21 | End: 2021-11-21

## 2021-11-21 RX ORDER — HEPARIN SODIUM 1000 [USP'U]/ML
3800 INJECTION, SOLUTION INTRAVENOUS; SUBCUTANEOUS
Status: DISCONTINUED | OUTPATIENT
Start: 2021-11-21 | End: 2021-11-21 | Stop reason: HOSPADM

## 2021-11-21 RX ORDER — KETOROLAC TROMETHAMINE 30 MG/ML
30 INJECTION, SOLUTION INTRAMUSCULAR; INTRAVENOUS ONCE
Status: COMPLETED | OUTPATIENT
Start: 2021-11-21 | End: 2021-11-21

## 2021-11-21 RX ORDER — DOCUSATE SODIUM 100 MG/1
100 CAPSULE, LIQUID FILLED ORAL 2 TIMES DAILY
Status: DISCONTINUED | OUTPATIENT
Start: 2021-11-21 | End: 2021-11-22 | Stop reason: HOSPADM

## 2021-11-21 RX ORDER — ACETAMINOPHEN 325 MG/1
975 TABLET ORAL EVERY 6 HOURS PRN
Status: DISCONTINUED | OUTPATIENT
Start: 2021-11-21 | End: 2021-11-22 | Stop reason: HOSPADM

## 2021-11-21 RX ORDER — HEPARIN SODIUM 10000 [USP'U]/100ML
3-30 INJECTION, SOLUTION INTRAVENOUS
Status: DISCONTINUED | OUTPATIENT
Start: 2021-11-21 | End: 2021-11-21 | Stop reason: HOSPADM

## 2021-11-21 RX ORDER — HYDROMORPHONE HCL/PF 1 MG/ML
0.5 SYRINGE (ML) INJECTION EVERY 6 HOURS PRN
Status: DISCONTINUED | OUTPATIENT
Start: 2021-11-21 | End: 2021-11-22 | Stop reason: HOSPADM

## 2021-11-21 RX ORDER — HEPARIN SODIUM 1000 [USP'U]/ML
7600 INJECTION, SOLUTION INTRAVENOUS; SUBCUTANEOUS
Status: DISCONTINUED | OUTPATIENT
Start: 2021-11-21 | End: 2021-11-21 | Stop reason: HOSPADM

## 2021-11-21 RX ORDER — ONDANSETRON 2 MG/ML
4 INJECTION INTRAMUSCULAR; INTRAVENOUS ONCE
Status: COMPLETED | OUTPATIENT
Start: 2021-11-21 | End: 2021-11-21

## 2021-11-21 RX ORDER — HEPARIN SODIUM 1000 [USP'U]/ML
3800 INJECTION, SOLUTION INTRAVENOUS; SUBCUTANEOUS
Status: DISCONTINUED | OUTPATIENT
Start: 2021-11-21 | End: 2021-11-22

## 2021-11-21 RX ORDER — TRAMADOL HYDROCHLORIDE 50 MG/1
50 TABLET ORAL EVERY 6 HOURS PRN
Status: DISCONTINUED | OUTPATIENT
Start: 2021-11-21 | End: 2021-11-22 | Stop reason: HOSPADM

## 2021-11-21 RX ORDER — HEPARIN SODIUM 10000 [USP'U]/100ML
3-30 INJECTION, SOLUTION INTRAVENOUS
Status: DISCONTINUED | OUTPATIENT
Start: 2021-11-21 | End: 2021-11-22

## 2021-11-21 RX ORDER — ONDANSETRON 2 MG/ML
4 INJECTION INTRAMUSCULAR; INTRAVENOUS EVERY 6 HOURS PRN
Status: DISCONTINUED | OUTPATIENT
Start: 2021-11-21 | End: 2021-11-22 | Stop reason: HOSPADM

## 2021-11-21 RX ORDER — HEPARIN SODIUM 1000 [USP'U]/ML
7600 INJECTION, SOLUTION INTRAVENOUS; SUBCUTANEOUS
Status: DISCONTINUED | OUTPATIENT
Start: 2021-11-21 | End: 2021-11-22

## 2021-11-21 RX ADMIN — HYDROMORPHONE HYDROCHLORIDE 0.5 MG: 1 INJECTION, SOLUTION INTRAMUSCULAR; INTRAVENOUS; SUBCUTANEOUS at 18:06

## 2021-11-21 RX ADMIN — HEPARIN SODIUM 7600 UNITS: 1000 INJECTION INTRAVENOUS; SUBCUTANEOUS at 16:32

## 2021-11-21 RX ADMIN — SODIUM CHLORIDE 1000 ML: 0.9 INJECTION, SOLUTION INTRAVENOUS at 15:40

## 2021-11-21 RX ADMIN — ACETAMINOPHEN 975 MG: 325 TABLET, FILM COATED ORAL at 23:20

## 2021-11-21 RX ADMIN — ONDANSETRON 4 MG: 2 INJECTION INTRAMUSCULAR; INTRAVENOUS at 16:19

## 2021-11-21 RX ADMIN — DOCUSATE SODIUM 100 MG: 100 CAPSULE ORAL at 23:23

## 2021-11-21 RX ADMIN — HEPARIN SODIUM 18 UNITS/KG/HR: 10000 INJECTION, SOLUTION INTRAVENOUS at 16:32

## 2021-11-21 RX ADMIN — IOHEXOL 100 ML: 350 INJECTION, SOLUTION INTRAVENOUS at 15:29

## 2021-11-21 RX ADMIN — HYDROMORPHONE HYDROCHLORIDE 0.5 MG: 1 INJECTION, SOLUTION INTRAMUSCULAR; INTRAVENOUS; SUBCUTANEOUS at 16:19

## 2021-11-21 RX ADMIN — HEPARIN SODIUM 18 UNITS/KG/HR: 10000 INJECTION, SOLUTION INTRAVENOUS at 22:00

## 2021-11-21 RX ADMIN — KETOROLAC TROMETHAMINE 30 MG: 30 INJECTION, SOLUTION INTRAMUSCULAR at 14:26

## 2021-11-22 ENCOUNTER — APPOINTMENT (INPATIENT)
Dept: NON INVASIVE DIAGNOSTICS | Facility: HOSPITAL | Age: 71
DRG: 176 | End: 2021-11-22
Payer: MEDICARE

## 2021-11-22 VITALS
HEART RATE: 85 BPM | TEMPERATURE: 97.9 F | SYSTOLIC BLOOD PRESSURE: 122 MMHG | OXYGEN SATURATION: 95 % | RESPIRATION RATE: 15 BRPM | HEIGHT: 64 IN | BODY MASS INDEX: 36.19 KG/M2 | DIASTOLIC BLOOD PRESSURE: 57 MMHG | WEIGHT: 212 LBS

## 2021-11-22 LAB
AORTIC ROOT: 2.9 CM
APICAL FOUR CHAMBER EJECTION FRACTION: 70 %
APTT PPP: 116 SECONDS (ref 23–37)
APTT PPP: >210 SECONDS (ref 23–37)
APTT PPP: >210 SECONDS (ref 23–37)
ASCENDING AORTA: 2.9 CM
ATRIAL RATE: 70 BPM
ATRIAL RATE: 86 BPM
BASOPHILS # BLD AUTO: 0.02 THOUSANDS/ΜL (ref 0–0.1)
BASOPHILS NFR BLD AUTO: 0 % (ref 0–1)
DEPRECATED AT III PPP: 96 % OF NORMAL (ref 92–136)
E WAVE DECELERATION TIME: 187 MS
EOSINOPHIL # BLD AUTO: 0.12 THOUSAND/ΜL (ref 0–0.61)
EOSINOPHIL NFR BLD AUTO: 2 % (ref 0–6)
ERYTHROCYTE [DISTWIDTH] IN BLOOD BY AUTOMATED COUNT: 12.3 % (ref 11.6–15.1)
FRACTIONAL SHORTENING: 36 % (ref 28–44)
HCT VFR BLD AUTO: 38 % (ref 34.8–46.1)
HGB BLD-MCNC: 12.4 G/DL (ref 11.5–15.4)
IMM GRANULOCYTES # BLD AUTO: 0.02 THOUSAND/UL (ref 0–0.2)
IMM GRANULOCYTES NFR BLD AUTO: 0 % (ref 0–2)
INTERVENTRICULAR SEPTUM IN DIASTOLE (PARASTERNAL SHORT AXIS VIEW): 0.9 CM
LEFT ATRIUM AREA SYSTOLE SINGLE PLANE A4C: 12.9 CM2
LEFT INTERNAL DIMENSION IN SYSTOLE: 2.9 CM (ref 2.1–4)
LEFT VENTRICULAR INTERNAL DIMENSION IN DIASTOLE: 4.5 CM (ref 5.56–8.29)
LEFT VENTRICULAR POSTERIOR WALL IN END DIASTOLE: 1 CM
LEFT VENTRICULAR STROKE VOLUME: 60 ML
LYMPHOCYTES # BLD AUTO: 1.61 THOUSANDS/ΜL (ref 0.6–4.47)
LYMPHOCYTES NFR BLD AUTO: 25 % (ref 14–44)
MCH RBC QN AUTO: 31.2 PG (ref 26.8–34.3)
MCHC RBC AUTO-ENTMCNC: 32.6 G/DL (ref 31.4–37.4)
MCV RBC AUTO: 96 FL (ref 82–98)
MONOCYTES # BLD AUTO: 0.69 THOUSAND/ΜL (ref 0.17–1.22)
MONOCYTES NFR BLD AUTO: 11 % (ref 4–12)
MV E'TISSUE VEL-SEP: 9 CM/S
MV PEAK A VEL: 0.85 M/S
MV PEAK E VEL: 90 CM/S
MV STENOSIS PRESSURE HALF TIME: 0 MS
NEUTROPHILS # BLD AUTO: 4.1 THOUSANDS/ΜL (ref 1.85–7.62)
NEUTS SEG NFR BLD AUTO: 62 % (ref 43–75)
NRBC BLD AUTO-RTO: 0 /100 WBCS
NT-PROBNP SERPL-MCNC: 67 PG/ML
P AXIS: 44 DEGREES
P AXIS: 63 DEGREES
PLATELET # BLD AUTO: 162 THOUSANDS/UL (ref 149–390)
PMV BLD AUTO: 11.2 FL (ref 8.9–12.7)
PR INTERVAL: 150 MS
PR INTERVAL: 164 MS
PROT C AG ACT/NOR PPP IA: 129 % OF NORMAL (ref 60–150)
PROT S ACT/NOR PPP: 85 % (ref 68–108)
QRS AXIS: -19 DEGREES
QRS AXIS: -9 DEGREES
QRSD INTERVAL: 78 MS
QRSD INTERVAL: 90 MS
QT INTERVAL: 360 MS
QT INTERVAL: 382 MS
QTC INTERVAL: 412 MS
QTC INTERVAL: 430 MS
RBC # BLD AUTO: 3.97 MILLION/UL (ref 3.81–5.12)
RIGHT ATRIUM AREA SYSTOLE A4C: 13.3 CM2
RIGHT VENTRICLE ID DIMENSION: 3.3 CM
SL CV LV EF: 55
SL CV PED ECHO LEFT VENTRICLE DIASTOLIC VOLUME (MOD BIPLANE) 2D: 93 ML
SL CV PED ECHO LEFT VENTRICLE SYSTOLIC VOLUME (MOD BIPLANE) 2D: 33 ML
T WAVE AXIS: 17 DEGREES
T WAVE AXIS: 30 DEGREES
TRICUSPID VALVE PEAK REGURGITATION VELOCITY: 2.59 M/S
TRICUSPID VALVE S': 0.9 CM/S
TV PEAK GRADIENT: 27 MMHG
VENTRICULAR RATE: 70 BPM
VENTRICULAR RATE: 86 BPM
WBC # BLD AUTO: 6.56 THOUSAND/UL (ref 4.31–10.16)
Z-SCORE OF LEFT VENTRICULAR DIMENSION IN END SYSTOLE: -4.11

## 2021-11-22 PROCEDURE — 85730 THROMBOPLASTIN TIME PARTIAL: CPT | Performed by: INTERNAL MEDICINE

## 2021-11-22 PROCEDURE — 93306 TTE W/DOPPLER COMPLETE: CPT

## 2021-11-22 PROCEDURE — 93306 TTE W/DOPPLER COMPLETE: CPT | Performed by: INTERNAL MEDICINE

## 2021-11-22 PROCEDURE — 99447 NTRPROF PH1/NTRNET/EHR 11-20: CPT | Performed by: STUDENT IN AN ORGANIZED HEALTH CARE EDUCATION/TRAINING PROGRAM

## 2021-11-22 PROCEDURE — 99232 SBSQ HOSP IP/OBS MODERATE 35: CPT | Performed by: INTERNAL MEDICINE

## 2021-11-22 PROCEDURE — 99222 1ST HOSP IP/OBS MODERATE 55: CPT | Performed by: INTERNAL MEDICINE

## 2021-11-22 PROCEDURE — 93970 EXTREMITY STUDY: CPT

## 2021-11-22 PROCEDURE — 93970 EXTREMITY STUDY: CPT | Performed by: SURGERY

## 2021-11-22 PROCEDURE — 85025 COMPLETE CBC W/AUTO DIFF WBC: CPT | Performed by: INTERNAL MEDICINE

## 2021-11-22 PROCEDURE — 93010 ELECTROCARDIOGRAM REPORT: CPT | Performed by: INTERNAL MEDICINE

## 2021-11-22 RX ORDER — METHOCARBAMOL 750 MG/1
750 TABLET, FILM COATED ORAL EVERY 8 HOURS SCHEDULED
Status: DISCONTINUED | OUTPATIENT
Start: 2021-11-22 | End: 2021-11-22 | Stop reason: HOSPADM

## 2021-11-22 RX ORDER — RIVAROXABAN 15 MG-20MG
1 KIT ORAL DAILY
Qty: 1 EACH | Refills: 0 | Status: SHIPPED | OUTPATIENT
Start: 2021-11-22 | End: 2021-11-22 | Stop reason: SDUPTHER

## 2021-11-22 RX ORDER — RIVAROXABAN 15 MG-20MG
1 KIT ORAL DAILY
Qty: 1 EACH | Refills: 0 | Status: SHIPPED | OUTPATIENT
Start: 2021-11-22

## 2021-11-22 RX ORDER — METHOCARBAMOL 750 MG/1
750 TABLET, FILM COATED ORAL EVERY 8 HOURS SCHEDULED
Qty: 20 TABLET | Refills: 0 | Status: SHIPPED | OUTPATIENT
Start: 2021-11-22 | End: 2021-11-29

## 2021-11-22 RX ADMIN — HEPARIN SODIUM 15 UNITS/KG/HR: 10000 INJECTION, SOLUTION INTRAVENOUS at 05:45

## 2021-11-22 RX ADMIN — METHOCARBAMOL TABLETS 750 MG: 750 TABLET, COATED ORAL at 13:48

## 2021-11-22 RX ADMIN — DOCUSATE SODIUM 100 MG: 100 CAPSULE ORAL at 09:11

## 2021-11-22 RX ADMIN — TRAMADOL HYDROCHLORIDE 50 MG: 50 TABLET, FILM COATED ORAL at 12:02

## 2021-11-22 RX ADMIN — HYDROMORPHONE HYDROCHLORIDE 0.5 MG: 1 INJECTION, SOLUTION INTRAMUSCULAR; INTRAVENOUS; SUBCUTANEOUS at 05:45

## 2021-11-22 RX ADMIN — RIVAROXABAN 15 MG: 15 TABLET, FILM COATED ORAL at 17:43

## 2021-11-22 RX ADMIN — OXYCODONE HYDROCHLORIDE 5 MG: 5 TABLET ORAL at 00:58

## 2021-11-23 LAB
B2 GLYCOPROT1 IGA SERPL IA-ACNC: 2
B2 GLYCOPROT1 IGG SERPL IA-ACNC: 0.9
B2 GLYCOPROT1 IGM SERPL IA-ACNC: <2.9
CARDIOLIPIN IGA SER IA-ACNC: 1.9
CARDIOLIPIN IGG SER IA-ACNC: 1.8
CARDIOLIPIN IGM SER IA-ACNC: <0.8

## 2021-11-24 ENCOUNTER — APPOINTMENT (OUTPATIENT)
Dept: LAB | Facility: CLINIC | Age: 71
End: 2021-11-24
Payer: MEDICARE

## 2021-11-24 ENCOUNTER — HOSPITAL ENCOUNTER (EMERGENCY)
Facility: HOSPITAL | Age: 71
Discharge: HOME | End: 2021-11-24
Attending: EMERGENCY MEDICINE | Admitting: EMERGENCY MEDICINE
Payer: MEDICARE

## 2021-11-24 ENCOUNTER — APPOINTMENT (EMERGENCY)
Dept: RADIOLOGY | Facility: HOSPITAL | Age: 71
End: 2021-11-24
Payer: MEDICARE

## 2021-11-24 ENCOUNTER — APPOINTMENT (EMERGENCY)
Dept: CT IMAGING | Facility: HOSPITAL | Age: 71
End: 2021-11-24
Payer: MEDICARE

## 2021-11-24 VITALS
SYSTOLIC BLOOD PRESSURE: 148 MMHG | BODY MASS INDEX: 36.21 KG/M2 | OXYGEN SATURATION: 98 % | RESPIRATION RATE: 20 BRPM | HEART RATE: 82 BPM | TEMPERATURE: 98.2 F | WEIGHT: 212.08 LBS | DIASTOLIC BLOOD PRESSURE: 66 MMHG | HEIGHT: 64 IN

## 2021-11-24 DIAGNOSIS — E87.6 HYPOKALEMIA: ICD-10-CM

## 2021-11-24 DIAGNOSIS — R25.2 CRAMP OF LIMB: ICD-10-CM

## 2021-11-24 DIAGNOSIS — M25.50 ARTHRALGIA: Primary | ICD-10-CM

## 2021-11-24 DIAGNOSIS — M79.10 MYALGIA: ICD-10-CM

## 2021-11-24 DIAGNOSIS — R53.83 FATIGUE: ICD-10-CM

## 2021-11-24 LAB
2HR DELTA HS TROPONIN: 0 NG/L
ALBUMIN SERPL BCP-MCNC: 3.6 G/DL (ref 3.5–5)
ALP SERPL-CCNC: 107 U/L (ref 46–116)
ALT SERPL W P-5'-P-CCNC: 56 U/L (ref 12–78)
ANION GAP SERPL CALCULATED.3IONS-SCNC: 6 MMOL/L (ref 4–13)
APTT PPP: 39 SECONDS (ref 23–37)
AST SERPL W P-5'-P-CCNC: 34 U/L (ref 5–45)
BACTERIA UR QL AUTO: NORMAL /HPF
BASOPHILS # BLD AUTO: 0.02 THOUSANDS/ΜL (ref 0–0.1)
BASOPHILS NFR BLD AUTO: 0 % (ref 0–1)
BILIRUB SERPL-MCNC: 0.7 MG/DL (ref 0.2–1)
BILIRUB UR QL STRIP: NEGATIVE
BUN SERPL-MCNC: 12 MG/DL (ref 5–25)
CALCIUM SERPL-MCNC: 9.2 MG/DL (ref 8.3–10.1)
CARDIAC TROPONIN I PNL SERPL HS: 3 NG/L
CARDIAC TROPONIN I PNL SERPL HS: 3 NG/L
CHLORIDE SERPL-SCNC: 102 MMOL/L (ref 100–108)
CK SERPL-CCNC: 34 U/L (ref 26–192)
CK SERPL-CCNC: 37 U/L (ref 26–192)
CLARITY UR: ABNORMAL
CO2 SERPL-SCNC: 30 MMOL/L (ref 21–32)
COLOR UR: YELLOW
CREAT SERPL-MCNC: 0.88 MG/DL (ref 0.6–1.3)
EOSINOPHIL # BLD AUTO: 0.1 THOUSAND/ΜL (ref 0–0.61)
EOSINOPHIL NFR BLD AUTO: 2 % (ref 0–6)
ERYTHROCYTE [DISTWIDTH] IN BLOOD BY AUTOMATED COUNT: 12.3 % (ref 11.6–15.1)
FLUAV RNA RESP QL NAA+PROBE: NEGATIVE
FLUBV RNA RESP QL NAA+PROBE: NEGATIVE
GFR SERPL CREATININE-BSD FRML MDRD: 67 ML/MIN/1.73SQ M
GLUCOSE SERPL-MCNC: 98 MG/DL (ref 65–140)
GLUCOSE UR STRIP-MCNC: NEGATIVE MG/DL
HCT VFR BLD AUTO: 42.8 % (ref 34.8–46.1)
HGB BLD-MCNC: 14 G/DL (ref 11.5–15.4)
HGB UR QL STRIP.AUTO: ABNORMAL
IMM GRANULOCYTES # BLD AUTO: 0.02 THOUSAND/UL (ref 0–0.2)
IMM GRANULOCYTES NFR BLD AUTO: 0 % (ref 0–2)
INR PPP: 2.23 (ref 0.84–1.19)
KETONES UR STRIP-MCNC: ABNORMAL MG/DL
LACTATE SERPL-SCNC: 1.4 MMOL/L (ref 0.5–2)
LEUKOCYTE ESTERASE UR QL STRIP: ABNORMAL
LYMPHOCYTES # BLD AUTO: 0.74 THOUSANDS/ΜL (ref 0.6–4.47)
LYMPHOCYTES NFR BLD AUTO: 11 % (ref 14–44)
MAGNESIUM SERPL-MCNC: 2.1 MG/DL (ref 1.6–2.6)
MCH RBC QN AUTO: 30.6 PG (ref 26.8–34.3)
MCHC RBC AUTO-ENTMCNC: 32.7 G/DL (ref 31.4–37.4)
MCV RBC AUTO: 93 FL (ref 82–98)
MONOCYTES # BLD AUTO: 0.62 THOUSAND/ΜL (ref 0.17–1.22)
MONOCYTES NFR BLD AUTO: 10 % (ref 4–12)
NEUTROPHILS # BLD AUTO: 5.06 THOUSANDS/ΜL (ref 1.85–7.62)
NEUTS SEG NFR BLD AUTO: 77 % (ref 43–75)
NITRITE UR QL STRIP: NEGATIVE
NON-SQ EPI CELLS URNS QL MICRO: NORMAL /HPF
NRBC BLD AUTO-RTO: 0 /100 WBCS
NT-PROBNP SERPL-MCNC: 117 PG/ML
PH UR STRIP.AUTO: 7 [PH]
PLATELET # BLD AUTO: 209 THOUSANDS/UL (ref 149–390)
PMV BLD AUTO: 11 FL (ref 8.9–12.7)
POTASSIUM SERPL-SCNC: 3.3 MMOL/L (ref 3.5–5.3)
PROT S ACT/NOR PPP: 102 % (ref 61–136)
PROT S PPP-ACNC: 115 % (ref 60–150)
PROT SERPL-MCNC: 7.5 G/DL (ref 6.4–8.2)
PROT UR STRIP-MCNC: NEGATIVE MG/DL
PROTHROMBIN TIME: 23.5 SECONDS (ref 11.6–14.5)
RBC # BLD AUTO: 4.58 MILLION/UL (ref 3.81–5.12)
RBC #/AREA URNS AUTO: NORMAL /HPF
RSV RNA RESP QL NAA+PROBE: NEGATIVE
SARS-COV-2 RNA RESP QL NAA+PROBE: NEGATIVE
SODIUM SERPL-SCNC: 138 MMOL/L (ref 136–145)
SP GR UR STRIP.AUTO: <=1.005 (ref 1–1.03)
TSH SERPL DL<=0.05 MIU/L-ACNC: 1.06 UIU/ML (ref 0.36–3.74)
UROBILINOGEN UR QL STRIP.AUTO: 0.2 E.U./DL
WBC # BLD AUTO: 6.56 THOUSAND/UL (ref 4.31–10.16)
WBC #/AREA URNS AUTO: NORMAL /HPF

## 2021-11-24 PROCEDURE — 84443 ASSAY THYROID STIM HORMONE: CPT | Performed by: PHYSICIAN ASSISTANT

## 2021-11-24 PROCEDURE — 83880 ASSAY OF NATRIURETIC PEPTIDE: CPT | Performed by: PHYSICIAN ASSISTANT

## 2021-11-24 PROCEDURE — 85025 COMPLETE CBC W/AUTO DIFF WBC: CPT | Performed by: PHYSICIAN ASSISTANT

## 2021-11-24 PROCEDURE — 81001 URINALYSIS AUTO W/SCOPE: CPT | Performed by: PHYSICIAN ASSISTANT

## 2021-11-24 PROCEDURE — 83735 ASSAY OF MAGNESIUM: CPT | Performed by: PHYSICIAN ASSISTANT

## 2021-11-24 PROCEDURE — 84484 ASSAY OF TROPONIN QUANT: CPT | Performed by: PHYSICIAN ASSISTANT

## 2021-11-24 PROCEDURE — 71045 X-RAY EXAM CHEST 1 VIEW: CPT

## 2021-11-24 PROCEDURE — G1004 CDSM NDSC: HCPCS

## 2021-11-24 PROCEDURE — 85730 THROMBOPLASTIN TIME PARTIAL: CPT | Performed by: PHYSICIAN ASSISTANT

## 2021-11-24 PROCEDURE — 99284 EMERGENCY DEPT VISIT MOD MDM: CPT

## 2021-11-24 PROCEDURE — 0241U HB NFCT DS VIR RESP RNA 4 TRGT: CPT | Performed by: PHYSICIAN ASSISTANT

## 2021-11-24 PROCEDURE — 70450 CT HEAD/BRAIN W/O DYE: CPT

## 2021-11-24 PROCEDURE — 36415 COLL VENOUS BLD VENIPUNCTURE: CPT

## 2021-11-24 PROCEDURE — 82550 ASSAY OF CK (CPK): CPT | Performed by: PHYSICIAN ASSISTANT

## 2021-11-24 PROCEDURE — 83605 ASSAY OF LACTIC ACID: CPT | Performed by: PHYSICIAN ASSISTANT

## 2021-11-24 PROCEDURE — 85610 PROTHROMBIN TIME: CPT | Performed by: PHYSICIAN ASSISTANT

## 2021-11-24 PROCEDURE — 93005 ELECTROCARDIOGRAM TRACING: CPT

## 2021-11-24 PROCEDURE — 96374 THER/PROPH/DIAG INJ IV PUSH: CPT

## 2021-11-24 PROCEDURE — 99284 EMERGENCY DEPT VISIT MOD MDM: CPT | Performed by: PHYSICIAN ASSISTANT

## 2021-11-24 PROCEDURE — 96361 HYDRATE IV INFUSION ADD-ON: CPT

## 2021-11-24 PROCEDURE — 80053 COMPREHEN METABOLIC PANEL: CPT | Performed by: PHYSICIAN ASSISTANT

## 2021-11-24 PROCEDURE — 82550 ASSAY OF CK (CPK): CPT

## 2021-11-24 PROCEDURE — 96375 TX/PRO/DX INJ NEW DRUG ADDON: CPT

## 2021-11-24 RX ORDER — POTASSIUM CHLORIDE 20 MEQ/1
20 TABLET, EXTENDED RELEASE ORAL ONCE
Status: COMPLETED | OUTPATIENT
Start: 2021-11-24 | End: 2021-11-24

## 2021-11-24 RX ORDER — ONDANSETRON 2 MG/ML
4 INJECTION INTRAMUSCULAR; INTRAVENOUS ONCE
Status: COMPLETED | OUTPATIENT
Start: 2021-11-24 | End: 2021-11-24

## 2021-11-24 RX ORDER — FENTANYL CITRATE 50 UG/ML
50 INJECTION, SOLUTION INTRAMUSCULAR; INTRAVENOUS ONCE
Status: COMPLETED | OUTPATIENT
Start: 2021-11-24 | End: 2021-11-24

## 2021-11-24 RX ORDER — METHOCARBAMOL 500 MG/1
500 TABLET, FILM COATED ORAL 3 TIMES DAILY PRN
Qty: 30 TABLET | Refills: 0 | Status: SHIPPED | OUTPATIENT
Start: 2021-11-24

## 2021-11-24 RX ADMIN — POTASSIUM CHLORIDE 20 MEQ: 1500 TABLET, EXTENDED RELEASE ORAL at 12:26

## 2021-11-24 RX ADMIN — SODIUM CHLORIDE 1000 ML: 0.9 INJECTION, SOLUTION INTRAVENOUS at 11:13

## 2021-11-24 RX ADMIN — ONDANSETRON 4 MG: 2 INJECTION INTRAMUSCULAR; INTRAVENOUS at 11:25

## 2021-11-24 RX ADMIN — FENTANYL CITRATE 50 MCG: 50 INJECTION INTRAMUSCULAR; INTRAVENOUS at 11:14

## 2021-11-25 LAB
APTT SCREEN TO CONFIRM RATIO: 0.79 RATIO (ref 0–1.34)
ATRIAL RATE: 85 BPM
ATRIAL RATE: 87 BPM
CONFIRM APTT/NORMAL: 65.1 SEC (ref 0–47.6)
DRVVT IMM 1:2 NP PPP: 41.1 SEC (ref 0–40.4)
DRVVT SCREEN TO CONFIRM RATIO: 0.9 RATIO (ref 0.8–1.2)
LA PPP-IMP: ABNORMAL
P AXIS: 54 DEGREES
P AXIS: 65 DEGREES
PR INTERVAL: 154 MS
PR INTERVAL: 168 MS
QRS AXIS: -3 DEGREES
QRS AXIS: -9 DEGREES
QRSD INTERVAL: 88 MS
QRSD INTERVAL: 92 MS
QT INTERVAL: 362 MS
QT INTERVAL: 370 MS
QTC INTERVAL: 435 MS
QTC INTERVAL: 440 MS
SCREEN APTT: 44.2 SEC (ref 0–51.9)
SCREEN DRVVT: 53.9 SEC (ref 0–47)
T WAVE AXIS: 23 DEGREES
T WAVE AXIS: 26 DEGREES
THROMBIN TIME: >150 SEC (ref 0–23)
TT IMM NP PPP: >150 SEC (ref 0–23)
TT P HPASE PPP: 22.2 SEC (ref 0–23)
VENTRICULAR RATE: 85 BPM
VENTRICULAR RATE: 87 BPM

## 2021-11-25 PROCEDURE — 93010 ELECTROCARDIOGRAM REPORT: CPT | Performed by: INTERNAL MEDICINE

## 2021-12-01 ENCOUNTER — TELEPHONE (OUTPATIENT)
Dept: HEMATOLOGY ONCOLOGY | Facility: CLINIC | Age: 71
End: 2021-12-01

## 2021-12-13 ENCOUNTER — APPOINTMENT (OUTPATIENT)
Dept: LAB | Facility: HOSPITAL | Age: 71
End: 2021-12-13
Attending: FAMILY MEDICINE
Payer: MEDICARE

## 2021-12-13 DIAGNOSIS — M25.50 PAIN IN JOINT, MULTIPLE SITES: ICD-10-CM

## 2021-12-13 LAB
CRP SERPL QL: 12.7 MG/L
ERYTHROCYTE [SEDIMENTATION RATE] IN BLOOD: 66 MM/HOUR (ref 0–29)

## 2021-12-13 PROCEDURE — 86140 C-REACTIVE PROTEIN: CPT

## 2021-12-13 PROCEDURE — 36415 COLL VENOUS BLD VENIPUNCTURE: CPT

## 2021-12-13 PROCEDURE — 86200 CCP ANTIBODY: CPT

## 2021-12-13 PROCEDURE — 86618 LYME DISEASE ANTIBODY: CPT

## 2021-12-13 PROCEDURE — 86430 RHEUMATOID FACTOR TEST QUAL: CPT

## 2021-12-13 PROCEDURE — 85652 RBC SED RATE AUTOMATED: CPT

## 2021-12-13 PROCEDURE — 86038 ANTINUCLEAR ANTIBODIES: CPT

## 2021-12-14 LAB
B BURGDOR IGG+IGM SER-ACNC: 3
CCP AB SER IA-ACNC: 2.6
RHEUMATOID FACT SER QL LA: NEGATIVE

## 2021-12-15 LAB — RYE IGE QN: NEGATIVE

## 2022-01-07 ENCOUNTER — CONSULT (OUTPATIENT)
Dept: HEMATOLOGY ONCOLOGY | Facility: CLINIC | Age: 72
End: 2022-01-07
Payer: MEDICARE

## 2022-01-07 VITALS
HEART RATE: 102 BPM | WEIGHT: 209 LBS | SYSTOLIC BLOOD PRESSURE: 150 MMHG | HEIGHT: 64 IN | DIASTOLIC BLOOD PRESSURE: 102 MMHG | BODY MASS INDEX: 35.68 KG/M2 | TEMPERATURE: 98.2 F

## 2022-01-07 DIAGNOSIS — I26.92 ACUTE SADDLE PULMONARY EMBOLISM WITHOUT ACUTE COR PULMONALE (HCC): Primary | ICD-10-CM

## 2022-01-07 PROCEDURE — 99205 OFFICE O/P NEW HI 60 MIN: CPT | Performed by: INTERNAL MEDICINE

## 2022-01-07 NOTE — PROGRESS NOTES
Joshua Wright  1950  [facility] [place]  [address] [facility] [address] [phone]    No chief complaint on file  Oncology History    No history exists  History of Present Illness:  May 2002 patient presented with CNS symptoms  She was found to have right lateral sinus thrombosis  She was treated with heparin, transition to Coumadin for 6 months and then transition to aspirin 325 mg p o  Daily  November 21, 2021 patient had CT chest abdomen pelvis for right-sided pleuritic back and chest pain and elevated D-dimer  CT showed saddle pulmonary embolus without evidence of cor pulmonale or right heart strain  She was hemodynamically stable started on heparin drip and transition to Xarelto  Thrombosis panel was negative  Review of Systems   Constitutional: Negative for appetite change, diaphoresis, fatigue and fever  HENT: Negative for sinus pain  Eyes: Negative for discharge  Respiratory: Negative for cough and shortness of breath  Cardiovascular: Negative for chest pain  Gastrointestinal: Negative for abdominal pain, constipation and diarrhea  Endocrine: Negative for cold intolerance  Genitourinary: Negative for difficulty urinating and hematuria  Musculoskeletal: Negative for joint swelling  Skin: Negative for rash  Allergic/Immunologic: Negative for environmental allergies  Neurological: Negative for dizziness and headaches  Hematological: Negative for adenopathy  Psychiatric/Behavioral: Negative for agitation  Patient Active Problem List   Diagnosis    Acute pulmonary embolism (HCC)     Past Medical History:   Diagnosis Date    Encephalitis     History of blood clot in brain      Past Surgical History:   Procedure Laterality Date    CHOLECYSTECTOMY      CYST REMOVAL Right     wrist    HEMORROIDECTOMY       No family history on file    Social History     Socioeconomic History    Marital status: /Civil Union     Spouse name: Not on file    Number of children: Not on file    Years of education: Not on file    Highest education level: Not on file   Occupational History    Not on file   Tobacco Use    Smoking status: Former Smoker    Smokeless tobacco: Never Used   Vaping Use    Vaping Use: Never used   Substance and Sexual Activity    Alcohol use: Not Currently    Drug use: Not Currently    Sexual activity: Not Currently   Other Topics Concern    Not on file   Social History Narrative    Not on file     Social Determinants of Health     Financial Resource Strain: Not on file   Food Insecurity: Not on file   Transportation Needs: Not on file   Physical Activity: Not on file   Stress: Not on file   Social Connections: Not on file   Intimate Partner Violence: Not on file   Housing Stability: Not on file       Current Outpatient Medications:     aspirin (ECOTRIN) 325 mg EC tablet, Take 325 mg by mouth daily  , Disp: , Rfl:     LUMIGAN 0 01 % ophthalmic drops, , Disp: , Rfl:     methocarbamol (ROBAXIN) 500 mg tablet, Take 1 tablet (500 mg total) by mouth 3 (three) times a day as needed for muscle spasms, Disp: 30 tablet, Rfl: 0    methocarbamol (ROBAXIN) 750 mg tablet, Take 1 tablet (750 mg total) by mouth every 8 (eight) hours for 20 doses, Disp: 20 tablet, Rfl: 0    rivaroxaban (Xarelto Starter Pack) 15 & 20 MG starter pack, Take 1 Package by mouth daily, Disp: 1 each, Rfl: 0  Allergies   Allergen Reactions    Percocet [Oxycodone-Acetaminophen] GI Intolerance    Percocet [Oxycodone-Acetaminophen] GI Intolerance    Sulfa Antibiotics      Uncertain allergy     Vitals:    01/07/22 0924   BP: (!) 150/102   Pulse: 102   Temp: 98 2 °F (36 8 °C)       Physical Exam  Constitutional:       Appearance: She is well-developed  HENT:      Head: Normocephalic and atraumatic  Eyes:      Pupils: Pupils are equal, round, and reactive to light     Cardiovascular:      Rate and Rhythm: Normal rate  Heart sounds: No murmur heard  Pulmonary:      Effort: No respiratory distress  Breath sounds: No wheezing or rales  Abdominal:      General: There is no distension  Palpations: Abdomen is soft  Tenderness: There is no abdominal tenderness  There is no rebound  Musculoskeletal:         General: No tenderness  Cervical back: Neck supple  Lymphadenopathy:      Cervical: No cervical adenopathy  Skin:     General: Skin is warm  Findings: No rash  Neurological:      Mental Status: She is alert and oriented to person, place, and time  Deep Tendon Reflexes: Reflexes normal    Psychiatric:         Thought Content: Thought content normal            Labs:  CBC, Coags, BMP, Mg, Phos     Imaging  No results found  I reviewed the above laboratory and imaging data  Discussion/Summary:  In summary, this is a 74-year-old female history of unprovoked saddle embolus  Thrombosis panel negative  She is currently on Xarelto 20 mg p o  Daily  I recommended anticoagulation until May 2022  At that time options would include observation or low-dose Xarelto, 10 mg p o  Daily  Aspirin is not a reasonable option in her case given that she had been on it at the time her thrombosis occurred  I would favor indefinite anticoagulation in her case  Additionally, she has some dental procedures pending  Given that the likelihood of recurrent thrombosis declines more steeply in the 1st 3 months than months 4-6, I would prefer to defer dental work requiring suspension of anticoagulation until after the end of February, if possible  We briefly discussed consideration for temporary IVC filter although I suspect we will be able to get away without this  I reviewed the above considerations at length with the patient and her daughter  They voiced understanding and agreement    I have not set up a follow-up appointment at this time but remain available if questions or problems arise in the future

## 2022-01-10 ENCOUNTER — APPOINTMENT (OUTPATIENT)
Dept: LAB | Facility: CLINIC | Age: 72
End: 2022-01-10
Payer: MEDICARE

## 2022-01-10 DIAGNOSIS — M06.4 INFLAMMATORY POLYARTHROPATHY (HCC): ICD-10-CM

## 2022-01-10 LAB
CRP SERPL QL: 6 MG/L
ERYTHROCYTE [SEDIMENTATION RATE] IN BLOOD: 45 MM/HOUR (ref 0–29)
GLUCOSE P FAST SERPL-MCNC: 86 MG/DL (ref 65–99)
URATE SERPL-MCNC: 3.5 MG/DL (ref 2–6.8)

## 2022-01-10 PROCEDURE — 84550 ASSAY OF BLOOD/URIC ACID: CPT

## 2022-01-10 PROCEDURE — 86140 C-REACTIVE PROTEIN: CPT

## 2022-01-10 PROCEDURE — 82947 ASSAY GLUCOSE BLOOD QUANT: CPT

## 2022-01-10 PROCEDURE — 36415 COLL VENOUS BLD VENIPUNCTURE: CPT

## 2022-01-10 PROCEDURE — 85652 RBC SED RATE AUTOMATED: CPT

## 2022-01-14 PROCEDURE — U0005 INFEC AGEN DETEC AMPLI PROBE: HCPCS | Performed by: FAMILY MEDICINE

## 2022-01-14 PROCEDURE — U0003 INFECTIOUS AGENT DETECTION BY NUCLEIC ACID (DNA OR RNA); SEVERE ACUTE RESPIRATORY SYNDROME CORONAVIRUS 2 (SARS-COV-2) (CORONAVIRUS DISEASE [COVID-19]), AMPLIFIED PROBE TECHNIQUE, MAKING USE OF HIGH THROUGHPUT TECHNOLOGIES AS DESCRIBED BY CMS-2020-01-R: HCPCS | Performed by: FAMILY MEDICINE

## 2022-03-21 ENCOUNTER — APPOINTMENT (OUTPATIENT)
Dept: LAB | Facility: CLINIC | Age: 72
End: 2022-03-21
Payer: MEDICARE

## 2022-03-21 DIAGNOSIS — M06.4 INFLAMMATORY POLYARTHROPATHY (HCC): ICD-10-CM

## 2022-03-21 DIAGNOSIS — Z79.899 ENCOUNTER FOR LONG-TERM (CURRENT) USE OF OTHER MEDICATIONS: ICD-10-CM

## 2022-03-21 LAB
ALBUMIN SERPL BCP-MCNC: 3.4 G/DL (ref 3.5–5)
ALP SERPL-CCNC: 96 U/L (ref 46–116)
ALT SERPL W P-5'-P-CCNC: 29 U/L (ref 12–78)
ANION GAP SERPL CALCULATED.3IONS-SCNC: 6 MMOL/L (ref 4–13)
AST SERPL W P-5'-P-CCNC: 17 U/L (ref 5–45)
BASOPHILS # BLD AUTO: 0.03 THOUSANDS/ΜL (ref 0–0.1)
BASOPHILS NFR BLD AUTO: 0 % (ref 0–1)
BILIRUB SERPL-MCNC: 0.63 MG/DL (ref 0.2–1)
BUN SERPL-MCNC: 19 MG/DL (ref 5–25)
CALCIUM ALBUM COR SERPL-MCNC: 10.1 MG/DL (ref 8.3–10.1)
CALCIUM SERPL-MCNC: 9.6 MG/DL (ref 8.3–10.1)
CHLORIDE SERPL-SCNC: 108 MMOL/L (ref 100–108)
CO2 SERPL-SCNC: 27 MMOL/L (ref 21–32)
CREAT SERPL-MCNC: 0.79 MG/DL (ref 0.6–1.3)
CRP SERPL QL: 5.8 MG/L
EOSINOPHIL # BLD AUTO: 0.03 THOUSAND/ΜL (ref 0–0.61)
EOSINOPHIL NFR BLD AUTO: 0 % (ref 0–6)
ERYTHROCYTE [DISTWIDTH] IN BLOOD BY AUTOMATED COUNT: 13.8 % (ref 11.6–15.1)
ERYTHROCYTE [SEDIMENTATION RATE] IN BLOOD: 41 MM/HOUR (ref 0–29)
GFR SERPL CREATININE-BSD FRML MDRD: 76 ML/MIN/1.73SQ M
GLUCOSE P FAST SERPL-MCNC: 105 MG/DL (ref 65–99)
HCT VFR BLD AUTO: 42.5 % (ref 34.8–46.1)
HGB BLD-MCNC: 13.8 G/DL (ref 11.5–15.4)
IMM GRANULOCYTES # BLD AUTO: 0.04 THOUSAND/UL (ref 0–0.2)
IMM GRANULOCYTES NFR BLD AUTO: 1 % (ref 0–2)
LYMPHOCYTES # BLD AUTO: 1.52 THOUSANDS/ΜL (ref 0.6–4.47)
LYMPHOCYTES NFR BLD AUTO: 17 % (ref 14–44)
MCH RBC QN AUTO: 30.9 PG (ref 26.8–34.3)
MCHC RBC AUTO-ENTMCNC: 32.5 G/DL (ref 31.4–37.4)
MCV RBC AUTO: 95 FL (ref 82–98)
MONOCYTES # BLD AUTO: 0.6 THOUSAND/ΜL (ref 0.17–1.22)
MONOCYTES NFR BLD AUTO: 7 % (ref 4–12)
NEUTROPHILS # BLD AUTO: 6.6 THOUSANDS/ΜL (ref 1.85–7.62)
NEUTS SEG NFR BLD AUTO: 75 % (ref 43–75)
NRBC BLD AUTO-RTO: 0 /100 WBCS
PLATELET # BLD AUTO: 266 THOUSANDS/UL (ref 149–390)
PMV BLD AUTO: 11.3 FL (ref 8.9–12.7)
POTASSIUM SERPL-SCNC: 4 MMOL/L (ref 3.5–5.3)
PROT SERPL-MCNC: 7.2 G/DL (ref 6.4–8.2)
RBC # BLD AUTO: 4.47 MILLION/UL (ref 3.81–5.12)
SODIUM SERPL-SCNC: 141 MMOL/L (ref 136–145)
WBC # BLD AUTO: 8.82 THOUSAND/UL (ref 4.31–10.16)

## 2022-03-21 PROCEDURE — 86140 C-REACTIVE PROTEIN: CPT

## 2022-03-21 PROCEDURE — 36415 COLL VENOUS BLD VENIPUNCTURE: CPT

## 2022-03-21 PROCEDURE — 85025 COMPLETE CBC W/AUTO DIFF WBC: CPT

## 2022-03-21 PROCEDURE — 85652 RBC SED RATE AUTOMATED: CPT

## 2022-03-21 PROCEDURE — 80053 COMPREHEN METABOLIC PANEL: CPT

## 2022-05-04 ENCOUNTER — APPOINTMENT (OUTPATIENT)
Dept: LAB | Facility: CLINIC | Age: 72
End: 2022-05-04
Payer: MEDICARE

## 2022-05-04 DIAGNOSIS — Z13.9 SCREENING FOR UNSPECIFIED CONDITION: ICD-10-CM

## 2022-05-04 DIAGNOSIS — Z13.220 SCREENING FOR LIPOID DISORDERS: ICD-10-CM

## 2022-05-04 LAB
ALBUMIN SERPL BCP-MCNC: 3.4 G/DL (ref 3.5–5)
ALP SERPL-CCNC: 124 U/L (ref 46–116)
ALT SERPL W P-5'-P-CCNC: 31 U/L (ref 12–78)
ANION GAP SERPL CALCULATED.3IONS-SCNC: 4 MMOL/L (ref 4–13)
AST SERPL W P-5'-P-CCNC: 18 U/L (ref 5–45)
BILIRUB SERPL-MCNC: 0.54 MG/DL (ref 0.2–1)
BUN SERPL-MCNC: 26 MG/DL (ref 5–25)
CALCIUM ALBUM COR SERPL-MCNC: 10 MG/DL (ref 8.3–10.1)
CALCIUM SERPL-MCNC: 9.5 MG/DL (ref 8.3–10.1)
CHLORIDE SERPL-SCNC: 108 MMOL/L (ref 100–108)
CHOLEST SERPL-MCNC: 179 MG/DL
CO2 SERPL-SCNC: 29 MMOL/L (ref 21–32)
CREAT SERPL-MCNC: 0.84 MG/DL (ref 0.6–1.3)
ERYTHROCYTE [DISTWIDTH] IN BLOOD BY AUTOMATED COUNT: 13.4 % (ref 11.6–15.1)
GFR SERPL CREATININE-BSD FRML MDRD: 71 ML/MIN/1.73SQ M
GLUCOSE P FAST SERPL-MCNC: 88 MG/DL (ref 65–99)
HCT VFR BLD AUTO: 42.1 % (ref 34.8–46.1)
HDLC SERPL-MCNC: 78 MG/DL
HGB BLD-MCNC: 13.5 G/DL (ref 11.5–15.4)
LDLC SERPL CALC-MCNC: 85 MG/DL (ref 0–100)
MCH RBC QN AUTO: 31.3 PG (ref 26.8–34.3)
MCHC RBC AUTO-ENTMCNC: 32.1 G/DL (ref 31.4–37.4)
MCV RBC AUTO: 98 FL (ref 82–98)
NONHDLC SERPL-MCNC: 101 MG/DL
PLATELET # BLD AUTO: 250 THOUSANDS/UL (ref 149–390)
PMV BLD AUTO: 11.4 FL (ref 8.9–12.7)
POTASSIUM SERPL-SCNC: 3.5 MMOL/L (ref 3.5–5.3)
PROT SERPL-MCNC: 7.1 G/DL (ref 6.4–8.2)
RBC # BLD AUTO: 4.31 MILLION/UL (ref 3.81–5.12)
SODIUM SERPL-SCNC: 141 MMOL/L (ref 136–145)
TRIGL SERPL-MCNC: 82 MG/DL
WBC # BLD AUTO: 9.43 THOUSAND/UL (ref 4.31–10.16)

## 2022-05-04 PROCEDURE — 80061 LIPID PANEL: CPT

## 2022-05-04 PROCEDURE — 36415 COLL VENOUS BLD VENIPUNCTURE: CPT

## 2022-05-04 PROCEDURE — 80053 COMPREHEN METABOLIC PANEL: CPT

## 2022-05-04 PROCEDURE — 85027 COMPLETE CBC AUTOMATED: CPT

## 2022-05-11 ENCOUNTER — APPOINTMENT (OUTPATIENT)
Dept: LAB | Facility: CLINIC | Age: 72
End: 2022-05-11
Payer: MEDICARE

## 2022-05-11 DIAGNOSIS — M06.4 INFLAMMATORY POLYARTHROPATHY (HCC): ICD-10-CM

## 2022-05-11 DIAGNOSIS — Z79.899 ENCOUNTER FOR LONG-TERM (CURRENT) USE OF OTHER MEDICATIONS: ICD-10-CM

## 2022-05-11 LAB
ALBUMIN SERPL BCP-MCNC: 3.2 G/DL (ref 3.5–5)
ALP SERPL-CCNC: 131 U/L (ref 46–116)
ALT SERPL W P-5'-P-CCNC: 43 U/L (ref 12–78)
ANION GAP SERPL CALCULATED.3IONS-SCNC: 4 MMOL/L (ref 4–13)
AST SERPL W P-5'-P-CCNC: 19 U/L (ref 5–45)
BASOPHILS # BLD AUTO: 0.03 THOUSANDS/ΜL (ref 0–0.1)
BASOPHILS NFR BLD AUTO: 0 % (ref 0–1)
BILIRUB SERPL-MCNC: 0.82 MG/DL (ref 0.2–1)
BUN SERPL-MCNC: 17 MG/DL (ref 5–25)
CALCIUM ALBUM COR SERPL-MCNC: 10.5 MG/DL (ref 8.3–10.1)
CALCIUM SERPL-MCNC: 9.9 MG/DL (ref 8.3–10.1)
CHLORIDE SERPL-SCNC: 108 MMOL/L (ref 100–108)
CO2 SERPL-SCNC: 30 MMOL/L (ref 21–32)
CREAT SERPL-MCNC: 0.79 MG/DL (ref 0.6–1.3)
CRP SERPL QL: 3.8 MG/L
EOSINOPHIL # BLD AUTO: 0.04 THOUSAND/ΜL (ref 0–0.61)
EOSINOPHIL NFR BLD AUTO: 0 % (ref 0–6)
ERYTHROCYTE [DISTWIDTH] IN BLOOD BY AUTOMATED COUNT: 13.5 % (ref 11.6–15.1)
ERYTHROCYTE [SEDIMENTATION RATE] IN BLOOD: 51 MM/HOUR (ref 0–29)
GFR SERPL CREATININE-BSD FRML MDRD: 76 ML/MIN/1.73SQ M
GLUCOSE P FAST SERPL-MCNC: 87 MG/DL (ref 65–99)
HCT VFR BLD AUTO: 42 % (ref 34.8–46.1)
HGB BLD-MCNC: 13.6 G/DL (ref 11.5–15.4)
IMM GRANULOCYTES # BLD AUTO: 0.06 THOUSAND/UL (ref 0–0.2)
IMM GRANULOCYTES NFR BLD AUTO: 1 % (ref 0–2)
LYMPHOCYTES # BLD AUTO: 2.58 THOUSANDS/ΜL (ref 0.6–4.47)
LYMPHOCYTES NFR BLD AUTO: 26 % (ref 14–44)
MCH RBC QN AUTO: 31.2 PG (ref 26.8–34.3)
MCHC RBC AUTO-ENTMCNC: 32.4 G/DL (ref 31.4–37.4)
MCV RBC AUTO: 96 FL (ref 82–98)
MONOCYTES # BLD AUTO: 0.78 THOUSAND/ΜL (ref 0.17–1.22)
MONOCYTES NFR BLD AUTO: 8 % (ref 4–12)
NEUTROPHILS # BLD AUTO: 6.39 THOUSANDS/ΜL (ref 1.85–7.62)
NEUTS SEG NFR BLD AUTO: 65 % (ref 43–75)
NRBC BLD AUTO-RTO: 0 /100 WBCS
PLATELET # BLD AUTO: 246 THOUSANDS/UL (ref 149–390)
PMV BLD AUTO: 11.1 FL (ref 8.9–12.7)
POTASSIUM SERPL-SCNC: 3.6 MMOL/L (ref 3.5–5.3)
PROT SERPL-MCNC: 7.1 G/DL (ref 6.4–8.2)
RBC # BLD AUTO: 4.36 MILLION/UL (ref 3.81–5.12)
SODIUM SERPL-SCNC: 142 MMOL/L (ref 136–145)
WBC # BLD AUTO: 9.88 THOUSAND/UL (ref 4.31–10.16)

## 2022-05-11 PROCEDURE — 85652 RBC SED RATE AUTOMATED: CPT

## 2022-05-11 PROCEDURE — 36415 COLL VENOUS BLD VENIPUNCTURE: CPT

## 2022-05-11 PROCEDURE — 86140 C-REACTIVE PROTEIN: CPT

## 2022-05-11 PROCEDURE — 85025 COMPLETE CBC W/AUTO DIFF WBC: CPT

## 2022-05-11 PROCEDURE — 80053 COMPREHEN METABOLIC PANEL: CPT

## 2022-05-17 NOTE — PROGRESS NOTES
PT Evaluation     Today's date: 2022  Patient name: Faiza Russell  : 1950  MRN: [id number]  Referring provider: Ashutosh Grande DO  Dx:   Encounter Diagnosis     ICD-10-CM    1  Chronic midline low back pain with right-sided sciatica  M54 41     G89 29        Start Time: 0900  Stop Time: 0855  Total time in clinic (min): 1435 minutes    Assessment  Assessment details: Patient is a 73 y o  female who reports to outpatient physical therapy with referral for LBP  Probable movement impairment diagnosis of lumbar derangement resulting in pathoanatomical symptoms of R sided low back pain with sciatica and limiting ability to sit/stand/walk for extended periods without pain  Patient has decreased muscle, cardiovascular endurance- fatigues easily with activity  Recent past medical history significant for blood clotting with pulmonary embolus causing hospitalization  Skilled physical therapy is warranted for the application of the interventions found below in the planned intervention section  Etiologic factors include increased lumbar pain since driving to [place]  Prognosis is good given HEP compliance and attendance to physical therapy 2x a week for 8 weeks  Positive prognostic indicators include low irritability on IE  Please contact me if you have any questions or recommendations  Thank you for the referral and the opportunity to share in [facility]  Patient verbalized understanding of POC, HEP, and return demonstrated HEP  Impairments: abnormal or restricted ROM, activity intolerance, impaired physical strength, lacks appropriate home exercise program, pain with function and poor posture     Goals  STG (4 weeks)  1  Patient will have reported <3/10 pain in R LE/low back at rest    2  Improve patient's R passive SLR to >45 degrees for increased ability to take proper strides during ambulation  3  Increase patient's R single leg balance to >10 seconds for increased stability on stairs     4  Patient will increase walking tolerance to 20 min  LTG (8 weeks)  1  Patient's LE strength will be equal bilaterally for ability to ambulate and return to functional activities at [facility]  2  Patient will be able to complete normal daily activity with 0/10 pain in lower back  3  Patient will be independent with home exercise program for continued maintenance post PT discharge  4  Patient will increase walking tolerance to >30 min  Plan  Plan details: Re-Evaluation in 4 weeks  Extension based exercise  Lumbar Stabilization/core strengthening  General LE conditioning, cardiovascular training  Planned therapy interventions: manual therapy, neuromuscular re-education, patient education, therapeutic activities, therapeutic exercise, home exercise program, gait training and balance  Frequency: 2x week  Duration in weeks: 12  Plan of Care beginning date: 2022  Plan of Care expiration date: 2022  Treatment plan discussed with: patient        Subjective Evaluation    History of Present Illness  Mechanism of injury: Daniel Quiñonez presents to outpatient physical therapy with a referral for low back pain  Daniel Quiñonez reports pain started in November and ended up finding out she had 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   Patient drove to [place] in April which set off back pain, had pain radiating into R LE      HPI/Primary Complaint: midline to R sided LBP; R LE pain is on and off; thinks her arthritis causes stiffness; has not been doing much walking recently; R LE pain stopped at knee, nothing into calf; gets out of breathe quickly with not having walked much recently; denies NT into LE  Walking tolerance= 10min   Relevant PMH: hospitalization d/t pulmonary embolus   Work/School: retired  Aggravating factors: standing and doing dishes; walking far; lays on side at night  Activity Limitations: walking longer distances  Patient Goals: Improve energy and endurance    Pain  Current pain ratin  At worst pain rating: 10    Social Support  Steps to enter house: yes (1 ALYSSA)  Stairs in house: no   Lives in[place]  Lives with: spouse          Objective     General Comments:      Lumbar Comments  Sitting Posture: forward lean    Palpation: TTP R lumbar paraspinals/QL    Movement Analysis:   Thoracolumbar AROM:   Flex: WFL; tight   Ext: 50% limited  Lateral Flex: R: painful/limited, L: WFL  Rotation: R: painful/limited, L: WFL    Repeated Motions:   Flex: -  Ext: no pain  Prone Press Up: n/a  DKTC: stretch    Functional Assessment:   Squat: n/a  Low ab iso hold: unable to perform; weak  Bed Mobility: -    MMTs:  Hip flex (L1-L2): R: 3+/5, L: 3+/5  Knee ext (L3-L4): R: 4+/5, L: 4+/5  Knee flex (S2-S3): R: 4+/5, L: 4+/5  Sidelying Hip Abd (L4-S1): R: 3+/5, L: 3+/5     Flexibility:   HS: R:+, L:+  Gastroc: R:+, L:+  Quad: R:+,   Hip Flexor: R:+,    Clinical Examination Tests:  SEBASTIAN: + R LBP; FADIR: -; Hip distraction: -;   Neural tension/irritation: SLR test: +;  Slump test: +;     5x STS= 15"             Precautions: recent hospitalization; blood clotting; Hx of pulmonary embolus;  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  HEP: 8PKZZHVD  (5/18)  RE: (6/18)  POC: (8/18)  FOTO: 47 (5/18)    Manuals 5/18                                                                Neuro Re-Ed             Core Brace             Brace + March             Brace + SLR                                                                 Ther Ex             LTR 2min            Hip add sqz x10            Sciatic N  glide x15 R            Stand L/s ext x10                                                                Ther Activity                                       Gait Training                                       Modalities

## 2022-05-18 ENCOUNTER — EVALUATION (OUTPATIENT)
Dept: PHYSICAL THERAPY | Facility: CLINIC | Age: 72
End: 2022-05-18
Payer: MEDICARE

## 2022-05-18 DIAGNOSIS — G89.29 CHRONIC MIDLINE LOW BACK PAIN WITH RIGHT-SIDED SCIATICA: Primary | ICD-10-CM

## 2022-05-18 DIAGNOSIS — M54.41 CHRONIC MIDLINE LOW BACK PAIN WITH RIGHT-SIDED SCIATICA: Primary | ICD-10-CM

## 2022-05-18 PROCEDURE — 97110 THERAPEUTIC EXERCISES: CPT

## 2022-05-18 PROCEDURE — 97162 PT EVAL MOD COMPLEX 30 MIN: CPT

## 2022-05-19 ENCOUNTER — OFFICE VISIT (OUTPATIENT)
Dept: PHYSICAL THERAPY | Facility: CLINIC | Age: 72
End: 2022-05-19
Payer: MEDICARE

## 2022-05-19 DIAGNOSIS — G89.29 CHRONIC MIDLINE LOW BACK PAIN WITH RIGHT-SIDED SCIATICA: Primary | ICD-10-CM

## 2022-05-19 DIAGNOSIS — M54.41 CHRONIC MIDLINE LOW BACK PAIN WITH RIGHT-SIDED SCIATICA: Primary | ICD-10-CM

## 2022-05-19 PROCEDURE — 97110 THERAPEUTIC EXERCISES: CPT

## 2022-05-19 PROCEDURE — 97112 NEUROMUSCULAR REEDUCATION: CPT

## 2022-05-19 NOTE — PROGRESS NOTES
Daily Note     Today's date: 2022  Patient name: Alicia Nails  : 1950  MRN: [id number]  Referring provider: Elby Seip, DO  Dx:   Encounter Diagnosis     ICD-10-CM    1  Chronic midline low back pain with right-sided sciatica  M54 41     G89 29                   Subjective: Patient states she feels generally deconditioned following blood clots  A long car trip to [place] exacerbated her back symptoms  She is hopeful to build her endurance & stamina & gain some energy  Objective: See treatment diary below      Assessment: Tolerated treatment well  Patient was educated on POC  Patient followed commands well and showed good understanding of initial program   Will continue to progress in upcoming visits according to her response  HEP was reviewed  Plan: Progress treatment as tolerated  Precautions: recent hospitalization; blood clotting; Hx of pulmonary embolus;  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  HEP: 8PKZZHVD  ()  RE: ()  POC: ()  FOTO: 47 ()    Manuals                                                                Neuro Re-Ed             Core Brace 10" hd  10x           Brace + March  20x           Brace + SLR                                                                              Ther Ex             TM  NV (3-4')           LTR 3" hd 2min 20x            Hip add sqz 5" hd x10 x15           Supine Sciatic N  Glide R only x15 R x15R L3           Stand L/S ext 10" hd x10 10x                                                               Ther Activity                                       Gait Training                                       Modalities

## 2022-05-23 ENCOUNTER — OFFICE VISIT (OUTPATIENT)
Dept: PHYSICAL THERAPY | Facility: CLINIC | Age: 72
End: 2022-05-23
Payer: MEDICARE

## 2022-05-23 DIAGNOSIS — G89.29 CHRONIC MIDLINE LOW BACK PAIN WITH RIGHT-SIDED SCIATICA: Primary | ICD-10-CM

## 2022-05-23 DIAGNOSIS — M54.41 CHRONIC MIDLINE LOW BACK PAIN WITH RIGHT-SIDED SCIATICA: Primary | ICD-10-CM

## 2022-05-23 PROCEDURE — 97112 NEUROMUSCULAR REEDUCATION: CPT

## 2022-05-23 PROCEDURE — 97110 THERAPEUTIC EXERCISES: CPT

## 2022-05-23 NOTE — PROGRESS NOTES
Daily Note     Today's date: 2022  Patient name: Eric Major  : 1950  MRN: [id number]  Referring provider: Margo Sutherland DO  Dx:   Encounter Diagnosis     ICD-10-CM    1  Chronic midline low back pain with right-sided sciatica  M54 41     G89 29                   Subjective: patient stated she is sore from planting flowers yesterday      Objective: See treatment diary below      Assessment: Patient tolerated treatment well  No exacerbation of sx with program  Patient able to complete all tasks assigned with good execution  Patient would benefit from continued therapy to decrease pain and increase strength and flexibility to improve LOF  Plan: Continue per plan of care  Progress treatment as tolerated  Precautions: recent hospitalization; blood clotting; Hx of pulmonary embolus;  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  HEP: 8PKZZHVD  ()  RE: ()  POC: ()  FOTO: 47 ()    Manuals                                                               Neuro Re-Ed             Core Brace 10" hd  10x 10x          Brace + March  20x 20x          Brace + SLR   10x                                                                            Ther Ex             TM  NV (3-4') 4 min   1 0 mph           LTR 3" hd 2min 20x  20x          Hip add sqz 10" hd x10 x15 15x          Supine Sciatic N  Glide R only x15 R x15R L3 x15 R only  L3          Stand L/S ext 10" hd x10 10x 10x                                                              Ther Activity                                       Gait Training                                       Modalities

## 2022-05-26 ENCOUNTER — OFFICE VISIT (OUTPATIENT)
Dept: PHYSICAL THERAPY | Facility: CLINIC | Age: 72
End: 2022-05-26
Payer: MEDICARE

## 2022-05-26 DIAGNOSIS — M54.41 CHRONIC MIDLINE LOW BACK PAIN WITH RIGHT-SIDED SCIATICA: Primary | ICD-10-CM

## 2022-05-26 DIAGNOSIS — G89.29 CHRONIC MIDLINE LOW BACK PAIN WITH RIGHT-SIDED SCIATICA: Primary | ICD-10-CM

## 2022-05-26 PROCEDURE — 97110 THERAPEUTIC EXERCISES: CPT

## 2022-05-26 PROCEDURE — 97112 NEUROMUSCULAR REEDUCATION: CPT

## 2022-05-26 NOTE — PROGRESS NOTES
Daily Note     Today's date: 2022  Patient name: Jayme Freeman  : 1950  MRN: [id number]  Referring provider: Doni Valdez DO  Dx:   Encounter Diagnosis     ICD-10-CM    1  Chronic midline low back pain with right-sided sciatica  M54 41     G89 29                   Subjective: patient stated her back pain is about 4/10 on pain scale but believes it is arthritis  Patient said, " what can I do? Can't do anything about arthritis "      Objective: See treatment diary below      Assessment: Patient able to progress with POC as indicated in flow sheet with no adverse symptoms  Patient has good recall of exercise program  Treatment session was effective in decrease pain experienced on arrival  Will continue to monitor and progress as able  Plan: Continue per plan of care  Progress treatment as tolerated  Precautions: recent hospitalization; blood clotting; Hx of pulmonary embolus;  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  HEP: 8PKZZHVD  ()  RE: ()  POC: ()  FOTO: 47 ()    Manuals                                                              Neuro Re-Ed             Core Brace 10" hd  10x 10x 10x         TA+ March  20x 20x 20x         TA + SLR   10x  2x10         TA set +bridge     15x         Isometric abdominal curl in supine     10x                                                 Ther Ex             TM  NV (3-4') 4 min   1 0 mph  5 min  1 0 mph         LTR 3" hd 2min 20x  20x 20x         Hip add sqz 10" hd x10 x15 15x x15         Supine Sciatic N  Glide R only x15 R x15R L3 x15 R only  L3 x15 R only L3          Stand L/S ext 10" hd x10 10x 10x 10x                                                              Ther Activity                                       Gait Training                                       Modalities

## 2022-06-02 ENCOUNTER — OFFICE VISIT (OUTPATIENT)
Dept: PHYSICAL THERAPY | Facility: CLINIC | Age: 72
End: 2022-06-02
Payer: MEDICARE

## 2022-06-02 DIAGNOSIS — M54.41 CHRONIC MIDLINE LOW BACK PAIN WITH RIGHT-SIDED SCIATICA: Primary | ICD-10-CM

## 2022-06-02 DIAGNOSIS — G89.29 CHRONIC MIDLINE LOW BACK PAIN WITH RIGHT-SIDED SCIATICA: Primary | ICD-10-CM

## 2022-06-02 PROCEDURE — 97112 NEUROMUSCULAR REEDUCATION: CPT

## 2022-06-02 PROCEDURE — 97110 THERAPEUTIC EXERCISES: CPT

## 2022-06-02 NOTE — PROGRESS NOTES
Daily Note     Today's date: 2022  Patient name: Mahogany Logan  : 1950  MRN: [id number]  Referring provider: Kt Hutchins DO  Dx:   Encounter Diagnosis     ICD-10-CM    1  Chronic midline low back pain with right-sided sciatica  M54 41     G89 29                   Subjective: Patient reports low back stiffness today, which is new for her  Patient reports inconsistent compliance to HEP  Objective: See treatment diary below    EDU on increase frequency on HEP in order to make progress outside the clinic, patient verbalized understanding  Assessment: Tolerated treatment well  Patient has good core activation, but she is challenged with coordinating breathing and maintaining brace  Reduction in low back stiffness noted as session progressed  Fatigue noted with all tasks today, more so with TM ambulation  Continued PT would be beneficial to improve function           Plan: Continue per plan of care  Precautions: recent hospitalization; blood clotting; Hx of pulmonary embolus;  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  HEP: 8PKZZHVD  ()  RE: ()  POC: ()  FOTO: 47 ()    Manuals                                                             Neuro Re-Ed             Core Brace 10" hd  10x 10x 10x 10x        TA+ March  20x 20x 20x 20x        TA + SLR   10x  2x10 2x10        TA set +bridge     15x 15x        Isometric abdominal curl in supine     10x  12x                                               Ther Ex             TM  NV (3-4') 4 min   1 0 mph  5 min  1 0 mph 5 min  1 0 mph        LTR 3" hd 2min 20x  20x 20x 20x        Hip add sqz 10" hd x10 x15 15x x15 x15        Supine Sciatic N  Glide R only x15 R x15R L3 x15 R only  L3 x15 R only L3  x15 R only L3        Stand L/S ext 10" hd x10 10x 10x 10x  10x                                                            Ther Activity                                       Gait Training Modalities

## 2022-06-06 ENCOUNTER — OFFICE VISIT (OUTPATIENT)
Dept: PHYSICAL THERAPY | Facility: CLINIC | Age: 72
End: 2022-06-06
Payer: MEDICARE

## 2022-06-06 DIAGNOSIS — G89.29 CHRONIC MIDLINE LOW BACK PAIN WITH RIGHT-SIDED SCIATICA: Primary | ICD-10-CM

## 2022-06-06 DIAGNOSIS — M54.41 CHRONIC MIDLINE LOW BACK PAIN WITH RIGHT-SIDED SCIATICA: Primary | ICD-10-CM

## 2022-06-06 PROCEDURE — 97112 NEUROMUSCULAR REEDUCATION: CPT

## 2022-06-06 PROCEDURE — 97110 THERAPEUTIC EXERCISES: CPT

## 2022-06-06 NOTE — PROGRESS NOTES
Daily Note     Today's date: 2022  Patient name: Kelly Sheets  : 1950  MRN: [id number]  Referring provider: Shae Perez DO  Dx:   Encounter Diagnosis     ICD-10-CM    1  Chronic midline low back pain with right-sided sciatica  M54 41     G89 29                   Subjective: Patent reports she is "stiff at the bottom of my spine  I just want to get my energy back " Patient reports some improvement  Objective: See treatment diary below      Assessment: Tolerated treatment fair  Patient exhibited good technique with therapeutic exercises  Some difficulty with mat mobility secondary to soreness and pain  Patient may be able to tolerate progression of program next visit  Plan: Progress treatment as tolerated  Precautions: recent hospitalization; blood clotting; Hx of pulmonary embolus;  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  HEP: [id number]  ()  RE: ()  POC: ()  FOTO: 47 ()    Manuals                                                            Neuro Re-Ed             Core Brace 10" hd  10x 10x 10x 10x 10x       TA+ March  20x 20x 20x 20x 20x       TA + SLR   10x  2x10 2x10 20x       TA set +bridge     15x 15x 15x       Isometric abdominal curl in supine     10x  12x 10x                                              Ther Ex             TM  NV (3-4') 4 min   1 0 mph  5 min  1 0 mph 5 min  1 0 mph 6 min 1 0 mph       LTR 3" hd 2min 20x  20x 20x 20x 20x       Hip add sqz 10" hd x10 x15 15x x15 x15 15x       Supine Sciatic N  Glide R only x15 R x15R L3 x15 R only  L3 x15 R only L3  x15 R only L3 X 15 R L3       Stand L/S ext 10" hd x10 10x 10x 10x  10x 10x                                                           Ther Activity                                       Gait Training                                       Modalities

## 2022-06-09 ENCOUNTER — OFFICE VISIT (OUTPATIENT)
Dept: PHYSICAL THERAPY | Facility: CLINIC | Age: 72
End: 2022-06-09
Payer: MEDICARE

## 2022-06-09 DIAGNOSIS — G89.29 CHRONIC MIDLINE LOW BACK PAIN WITH RIGHT-SIDED SCIATICA: Primary | ICD-10-CM

## 2022-06-09 DIAGNOSIS — M54.41 CHRONIC MIDLINE LOW BACK PAIN WITH RIGHT-SIDED SCIATICA: Primary | ICD-10-CM

## 2022-06-09 PROCEDURE — 97140 MANUAL THERAPY 1/> REGIONS: CPT | Performed by: PHYSICAL THERAPIST

## 2022-06-09 PROCEDURE — 97112 NEUROMUSCULAR REEDUCATION: CPT | Performed by: PHYSICAL THERAPIST

## 2022-06-09 PROCEDURE — 97110 THERAPEUTIC EXERCISES: CPT | Performed by: PHYSICAL THERAPIST

## 2022-06-09 NOTE — PROGRESS NOTES
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  HEP: 8PKZZHVD  ()  RE: ()  POC: ()  FOTO: 47 ()    Manuals    MFR to lumbar paraspinals and QL 15'                                   Neuro Re-Ed         Core Brace 10" hd 10x  10x 10x 10x 10x   TA+ March 20x20x  20x 20x 20x 20x   TA + SLR 20x  10x  2x10 2x10 20x   TA set +bridge  15x   15x 15x 15x   Isometric abdominal curl in supine  10x   10x  12x 10x                              Ther Ex         TM 6 min  1 2 mph  4 min   1 0 mph  5 min  1 0 mph 5 min  1 0 mph 6 min 1 0 mph   LTR 3" hd 20x  20x 20x 20x 20x   Hip add sqz 10" hd 15x  15x x15 x15 15x   Supine Sciatic N  Glide R only L3 20x  x15 R only  L3 x15 R only L3  x15 R only L3 X 15 R L3   Stand L/S ext 10" hd 10x  10x 10x  10x 10x                                       Ther Activity                           Gait Training                           Modalities

## 2022-06-20 ENCOUNTER — APPOINTMENT (OUTPATIENT)
Dept: PHYSICAL THERAPY | Facility: CLINIC | Age: 72
End: 2022-06-20
Payer: MEDICARE

## 2022-06-23 ENCOUNTER — APPOINTMENT (OUTPATIENT)
Dept: PHYSICAL THERAPY | Facility: CLINIC | Age: 72
End: 2022-06-23
Payer: MEDICARE

## 2022-06-27 ENCOUNTER — APPOINTMENT (OUTPATIENT)
Dept: PHYSICAL THERAPY | Facility: CLINIC | Age: 72
End: 2022-06-27
Payer: MEDICARE

## 2022-06-30 ENCOUNTER — EVALUATION (OUTPATIENT)
Dept: PHYSICAL THERAPY | Facility: CLINIC | Age: 72
End: 2022-06-30
Payer: MEDICARE

## 2022-06-30 DIAGNOSIS — M54.41 CHRONIC MIDLINE LOW BACK PAIN WITH RIGHT-SIDED SCIATICA: Primary | ICD-10-CM

## 2022-06-30 DIAGNOSIS — G89.29 CHRONIC MIDLINE LOW BACK PAIN WITH RIGHT-SIDED SCIATICA: Primary | ICD-10-CM

## 2022-06-30 PROCEDURE — 97140 MANUAL THERAPY 1/> REGIONS: CPT | Performed by: PHYSICAL THERAPIST

## 2022-06-30 PROCEDURE — 97110 THERAPEUTIC EXERCISES: CPT | Performed by: PHYSICAL THERAPIST

## 2022-06-30 NOTE — PROGRESS NOTES
PT Evaluation     Today's date: 2022  Patient name: Ryann Silvestre  : 1950  MRN: [id number]  Referring provider: Dre Mckay DO  Dx:   Encounter Diagnosis     ICD-10-CM    1  Chronic midline low back pain with right-sided sciatica  M54 41     G89 29                   Assessment  Assessment details: Patient is a 73 y o  female who reports to outpatient physical therapy with referral for LBP  Probable movement impairment diagnosis of lumbar derangement resulting in pathoanatomical symptoms of R sided low back pain with sciatica and limiting ability to sit/stand for extended periods without pain  Patient fatigues quickly with activity  Recent past medical history significant for blood clotting with pulmonary embolus causing hospitalization  Patient demonstrates improved ROM and flexibility  Skilled physical therapy is warranted for the application of the interventions found below in the planned intervention section  Prognosis is good given HEP compliance and attendance to physical therapy 2x a week for 8 weeks  Please contact me if you have any questions or recommendations  Thank you for the referral and the opportunity to share in [facility]  Patient verbalized understanding of POC, HEP, and return demonstrated HEP  Impairments: abnormal or restricted ROM, activity intolerance, impaired physical strength, lacks appropriate home exercise program, pain with function and poor posture     Goals  STG (4 weeks)  1  Patient will have reported <3/10 pain in R LE/low back at rest   - MET  2  Improve patient's R passive SLR to >45 degrees for increased ability to take proper strides during ambulation  - Not MET  3  Increase patient's R single leg balance to >10 seconds for increased stability on stairs  - Not MET  4  Patient will increase walking tolerance to 20 min  - MET    LTG (8 weeks)  1   Patient's LE strength will be equal bilaterally for ability to ambulate and return to functional activities at [facility]  2  Patient will be able to complete normal daily activity with 0/10 pain in lower back  - Partially MET  3  Patient will be independent with home exercise program for continued maintenance post PT discharge  - Progressing  4  Patient will increase walking tolerance to >30 min  - Partially MET      Plan  Plan details: Re-Evaluation in 4 weeks  Lumbar Stabilization/core strengthening  General LE conditioning, cardiovascular training  Patient would benefit from: skilled physical therapy  Planned therapy interventions: manual therapy, neuromuscular re-education, patient education, therapeutic activities, therapeutic exercise, home exercise program, gait training and balance  Frequency: 2x week  Duration in weeks: 8  Plan of Care beginning date: 2022  Plan of Care expiration date: 2022  Treatment plan discussed with: patient        Subjective Evaluation    History of Present Illness  Mechanism of injury: Navid Barahona presents to outpatient physical therapy with a referral for low back pain  Navid Barahona reports pain started in November and ended up finding out she had 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   Patient drove to [place] in April which set off back pain, had pain radiating into R LE      HPI/Primary Complaint: midline to R sided LBP; R LE pain is on and off; thinks her arthritis causes stiffness; has not been doing much walking recently; R LE pain stopped at knee, nothing into calf; gets out of breathe quickly with not having walked much recently; denies NT into LE  Walking tolerance= 10min   Relevant PMH: hospitalization d/t pulmonary embolus   Work/School: retired  Aggravating factors: standing and doing dishes; walking far; lays on side at night  Activity Limitations: walking longer distances  Patient Goals: Improve energy and endurance    Pain  Current pain ratin  At worst pain ratin  Quality: sharp  Aggravating factors: sitting  Progression: improved    Social Support  Steps to enter house: yes (1 ALYSSA)  Stairs in house: no   Lives in: one-story house  Lives with: spouse    Treatments  Current treatment: medication and physical therapy  Patient Goals  Patient goals for therapy: decreased pain, increased motion, increased strength, independence with ADLs/IADLs and return to sport/leisure activities          Objective     General Comments:      Lumbar Comments  Sitting Posture: forward lean    Palpation: TTP R lumbar paraspinals/QL    Movement Analysis:   Thoracolumbar AROM:   Flex: WFL; tight   Ext: 50% limited  Lateral Flex: R: WFL, L: WFL (with tension)  Rotation: R: WFL, L: WFL    Repeated Motions:   Flex: no change  Ext: no change    Functional Assessment:   Core: MMTs:  Hip flex (L1-L2): R: 4-/5, L: 4-/5  Knee ext (L3-L4): R: 4/5, L: 4/5  Knee flex (S2-S3): R: 4+/5, L: 4/5  Sidelying Hip Abd (L4-S1): R: 2+/5, L: 2+/5     Flexibility:   HS:   R:-13   L: -22    Ely's: R:+,    Clinical Examination Tests:  SEBASTIAN: + R LBP  Neural tension/irritation: SLR test: + R;  Slump test: - B    SLS:  L: 7"  R: 2"             Precautions: recent hospitalization; blood clotting; Hx of pulmonary embolus;  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     Manuals 6/9 6/30 5/26 6/2 6/6   MFR to lumbar paraspinals and QL 15'  15'                                                       Neuro Re-Ed              Core Brace 10" hd 10x    10x 10x 10x   TA+ March 20x20x    20x 20x 20x   TA + SLR 20x    2x10 2x10 20x   TA set +bridge  15x    15x 15x 15x   Isometric abdominal curl in supine  10x    10x  12x 10x                                                Ther Ex              TM 6 min  1 2 mph  6 min  1 2 mph  5 min  1 0 mph 5 min  1 0 mph 6 min 1 0 mph   LTR 3" hd 20x    20x 20x 20x   Hip add sqz 10" hd 15x    x15 x15 15x   Supine Sciatic N  Glide R only L3 20x    x15 R only L3  x15 R only L3 X 15 R L3   Stand L/S ext 10" hd 10x    10x  10x 10x                                                               Ther Activity                                            Gait Training                                            Modalities

## 2022-06-30 NOTE — LETTER
2022    [address]    Patient: Diamond Cox   YOB: 1950   Date of Visit: 2022     Encounter Diagnosis     ICD-10-CM    1  Chronic midline low back pain with right-sided sciatica  M54 41     G89 29        Dear Dr Noah Teresa:    Thank you for your recent referral of Diamond Cox  Please review the attached evaluation summary from Raquel's recent visit  Please verify that you agree with the plan of care by signing the attached order  If you have any questions or concerns, please do not hesitate to call  I sincerely appreciate the opportunity to share in the care of one of your patients and hope to have another opportunity to work with you in the near future  Sincerely,    Aleksandar Barreto, PT      Referring Provider:      I certify that I have read the below Plan of Care and certify the need for these services furnished under this plan of treatment while under my care  Yaya Rodriguez, [address] [id number] [facility] [address]  Via Fax: [phone]          PT Evaluation     Today's date: 2022  Patient name: Diamond Cox  : 1950  MRN: [id number]  Referring provider: Teddy Snyder DO  Dx:   Encounter Diagnosis     ICD-10-CM    1  Chronic midline low back pain with right-sided sciatica  M54 41     G89 29                   Assessment  Assessment details: Patient is a 73 y o  female who reports to outpatient physical therapy with referral for LBP  Probable movement impairment diagnosis of lumbar derangement resulting in pathoanatomical symptoms of R sided low back pain with sciatica and limiting ability to sit/stand for extended periods without pain  Patient fatigues quickly with activity  Recent past medical history significant for blood clotting with pulmonary embolus causing hospitalization  Patient demonstrates improved ROM and flexibility    Skilled physical therapy is warranted for the application of the interventions found below in the planned intervention section  Prognosis is good given HEP compliance and attendance to physical therapy 2x a week for 8 weeks  Please contact me if you have any questions or recommendations  Thank you for the referral and the opportunity to share in [facility]  Patient verbalized understanding of POC, HEP, and return demonstrated HEP  Impairments: abnormal or restricted ROM, activity intolerance, impaired physical strength, lacks appropriate home exercise program, pain with function and poor posture     Goals  STG (4 weeks)  1  Patient will have reported <3/10 pain in R LE/low back at rest   - MET  2  Improve patient's R passive SLR to >45 degrees for increased ability to take proper strides during ambulation  - Not MET  3  Increase patient's R single leg balance to >10 seconds for increased stability on stairs  - Not MET  4  Patient will increase walking tolerance to 20 min  - MET    LTG (8 weeks)  1  Patient's LE strength will be equal bilaterally for ability to ambulate and return to functional activities at [facility]  2  Patient will be able to complete normal daily activity with 0/10 pain in lower back  - Partially MET  3  Patient will be independent with home exercise program for continued maintenance post PT discharge  - Progressing  4  Patient will increase walking tolerance to >30 min  - Partially MET      Plan  Plan details: Re-Evaluation in 4 weeks  Lumbar Stabilization/core strengthening  General LE conditioning, cardiovascular training     Patient would benefit from: skilled physical therapy  Planned therapy interventions: manual therapy, neuromuscular re-education, patient education, therapeutic activities, therapeutic exercise, home exercise program, gait training and balance  Frequency: 2x week  Duration in weeks: 8  Plan of Care beginning date: 6/18/2022  Plan of Care expiration date: 8/18/2022  Treatment plan discussed with: patient        Subjective Evaluation    History of Present Illness  Mechanism of injury: Kellee Lerner presents to outpatient physical therapy with a referral for low back pain  Kellee Lerner reports pain started in November and ended up finding out she had 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  Patient drove to [place] in April which set off back pain, had pain radiating into R LE      HPI/Primary Complaint: midline to R sided LBP; R LE pain is on and off; thinks her arthritis causes stiffness; has not been doing much walking recently; R LE pain stopped at knee, nothing into calf; gets out of breathe quickly with not having walked much recently; denies NT into LE  Walking tolerance= 10min   Relevant PMH: hospitalization d/t pulmonary embolus   Work/School: retired  Aggravating factors: standing and doing dishes; walking far; lays on side at night  Activity Limitations: walking longer distances  Patient Goals: Improve energy and endurance    Pain  Current pain ratin  At worst pain ratin  Quality: sharp  Aggravating factors: sitting  Progression: improved    Social Support  Steps to enter house: yes (1 ALYSSA)  Stairs in house: no   Lives in: [facility]  Lives with: spouse    Treatments  Current treatment: medication and physical therapy  Patient Goals  Patient goals for therapy: decreased pain, increased motion, increased strength, independence with ADLs/IADLs and return to sport/leisure activities          Objective     General Comments:      Lumbar Comments  Sitting Posture: forward lean    Palpation: TTP R lumbar paraspinals/QL    Movement Analysis:   Thoracolumbar AROM:   Flex: WFL; tight   Ext: 50% limited  Lateral Flex: R: WFL, L: WFL (with tension)  Rotation: R: WFL, L: WFL    Repeated Motions:   Flex: no change  Ext: no change    Functional Assessment:   Core:     MMTs:  Hip flex (L1-L2): R: 4-/5, L: 4-/5  Knee ext (L3-L4): R: 4/5, L: 4/5  Knee flex (S2-S3): R: 4+/5, L: 4/5  Sidelying Hip Abd (L4-S1): R: 2+/5, L: 2+/5     Flexibility:   HS:   R:-13   L: -22    Ely's: R:+,    Clinical Examination Tests:  SEBASTINA: + R LBP  Neural tension/irritation: SLR test: + R;  Slump test: - B    SLS:  L: 7"  R: 2"             Precautions: recent hospitalization; blood clotting; Hx of pulmonary embolus;  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     Manuals 6/9 6/30 5/26 6/2 6/6   MFR to lumbar paraspinals and QL 15'  15'                                                       Neuro Re-Ed              Core Brace 10" hd 10x    10x 10x 10x   TA+ March 20x20x    20x 20x 20x   TA + SLR 20x    2x10 2x10 20x   TA set +bridge  15x    15x 15x 15x   Isometric abdominal curl in supine  10x    10x  12x 10x                                                Ther Ex              TM 6 min  1 2 mph  6 min  1 2 mph  5 min  1 0 mph 5 min  1 0 mph 6 min 1 0 mph   LTR 3" hd 20x    20x 20x 20x   Hip add sqz 10" hd 15x    x15 x15 15x   Supine Sciatic N  Glide R only L3 20x    x15 R only L3  x15 R only L3 X 15 R L3   Stand L/S ext 10" hd 10x    10x  10x 10x                                                               Ther Activity                                            Gait Training                                            Modalities

## 2022-07-05 ENCOUNTER — OFFICE VISIT (OUTPATIENT)
Dept: PHYSICAL THERAPY | Facility: CLINIC | Age: 72
End: 2022-07-05
Payer: MEDICARE

## 2022-07-05 DIAGNOSIS — G89.29 CHRONIC MIDLINE LOW BACK PAIN WITH RIGHT-SIDED SCIATICA: Primary | ICD-10-CM

## 2022-07-05 DIAGNOSIS — M54.41 CHRONIC MIDLINE LOW BACK PAIN WITH RIGHT-SIDED SCIATICA: Primary | ICD-10-CM

## 2022-07-05 PROCEDURE — 97112 NEUROMUSCULAR REEDUCATION: CPT

## 2022-07-05 PROCEDURE — 97140 MANUAL THERAPY 1/> REGIONS: CPT

## 2022-07-05 PROCEDURE — 97110 THERAPEUTIC EXERCISES: CPT

## 2022-07-05 NOTE — PROGRESS NOTES
Daily Note     Today's date: 2022  Patient name: Kush Wild  : 1950  MRN: [id number]  Referring provider: Jelani Corbett DO  Dx:   Encounter Diagnosis     ICD-10-CM    1  Chronic midline low back pain with right-sided sciatica  M54 41     G89 29                   Subjective: patient reported having reaction to Bactrum over the weekend breaking out into hives and needing to take Benadryl all weekend  Because of the medicine, she stated she was tired  Objective: See treatment diary below      Assessment: No progression with program due to aforementioned subjective comments  Patient had difficult with form on isometric abdominal curls so used physio ball to improve    Will continue to monitor pain levels and progress as able      Plan: Continue per plan of care  Progress treatment as tolerated  Precautions: recent hospitalization; blood clotting; Hx of pulmonary embolus;  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     Manuals    MFR to lumbar paraspinals and QL 15'  15' 15'                                                  Neuro Re-Ed             Core Brace 10" hd 10x  10 10  10x 10x   TA+ March 20x20x  20x 20x  20x 20x   TA + SLR 20x  20x 20x  2x10 20x   TA set +bridge  15x  15x 25x  15x 15x   Isometric abdominal curl in supine  10x  10x    10x  Physio ball  12x 10x                                             Ther Ex             TM 6 min  1 2 mph  6 min  1 2 mph 6 min   1 2 mph  5 min  1 0 mph 6 min 1 0 mph   LTR 3" hd 20x  20x 20x  20x 20x   Hip add sqz 10" hd 15x  15x 15x  x15 15x   Supine Sciatic N  Glide R only L3 20x  L3 20x  L3 20x   x15 R only L3 X 15 R L3   Stand L/S ext 10" hd 10x  10x 10x   10x 10x                                                           Ther Activity                                         Gait Training                                         Modalities                                      
headache

## 2022-07-07 ENCOUNTER — OFFICE VISIT (OUTPATIENT)
Dept: PHYSICAL THERAPY | Facility: CLINIC | Age: 72
End: 2022-07-07
Payer: MEDICARE

## 2022-07-07 DIAGNOSIS — G89.29 CHRONIC MIDLINE LOW BACK PAIN WITH RIGHT-SIDED SCIATICA: Primary | ICD-10-CM

## 2022-07-07 DIAGNOSIS — M54.41 CHRONIC MIDLINE LOW BACK PAIN WITH RIGHT-SIDED SCIATICA: Primary | ICD-10-CM

## 2022-07-07 PROCEDURE — 97112 NEUROMUSCULAR REEDUCATION: CPT

## 2022-07-07 PROCEDURE — 97140 MANUAL THERAPY 1/> REGIONS: CPT

## 2022-07-07 PROCEDURE — 97110 THERAPEUTIC EXERCISES: CPT

## 2022-07-11 ENCOUNTER — OFFICE VISIT (OUTPATIENT)
Dept: PHYSICAL THERAPY | Facility: CLINIC | Age: 72
End: 2022-07-11
Payer: MEDICARE

## 2022-07-11 DIAGNOSIS — G89.29 CHRONIC MIDLINE LOW BACK PAIN WITH RIGHT-SIDED SCIATICA: Primary | ICD-10-CM

## 2022-07-11 DIAGNOSIS — M54.41 CHRONIC MIDLINE LOW BACK PAIN WITH RIGHT-SIDED SCIATICA: Primary | ICD-10-CM

## 2022-07-11 PROCEDURE — 97110 THERAPEUTIC EXERCISES: CPT

## 2022-07-11 PROCEDURE — 97140 MANUAL THERAPY 1/> REGIONS: CPT

## 2022-07-11 NOTE — PROGRESS NOTES
Daily Note     Today's date: 2022  Patient name: Nikita Carolina  : 1950  MRN: [id number]  Referring provider: Rebeka Quesada DO  Dx:   Encounter Diagnosis     ICD-10-CM    1  Chronic midline low back pain with right-sided sciatica  M54 41     G89 29                   Subjective: Patient reports some soreness from LV  Objective: See treatment diary below      Assessment: Tolerated treatment well  Patient has good recall of TE, demonstrating good execution  Cuing to avoid compensatory motions with hip strengthening, good carryover  Fair TA activation noted, minimal improvement with tactile cuing  Favorable response to STM  Continued PT would be beneficial to improve function           Plan: Continue per plan of care  Precautions: recent hospitalization; blood clotting; Hx of pulmonary embolus;  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     Manuals     MFR to lumbar paraspinals and QL 15'  15' 15' 10' + piriformis 15'                                        Neuro Re-Ed           Core Brace 10" hd 10x  10 10 10 10    TA+ March 20x20x  20x 20x 20x 20x    TA + SLR 20x  20x 20x 20x 20x    TA set +bridge  15x  15x 25x 25x 25x    Isometric abdominal curl in supine  10x  10x    10x  Physio ball 10x physico ball 10x physico ball                                        Ther Ex           TM 6 min  1 2 mph  6 min  1 2 mph 6 min   1 2 mph 6 min 1 2 mph 6 min 1 2 mph    LTR 3" hd 20x  20x 20x 20x 20x    Hip add sqz 10" hd 15x  15x 15x 20x 20x    Supine Sciatic N  Glide R only L3 20x  L3 20x  L3 20x  L3 20x L3 20x    Stand L/S ext 10" hd 10x  10x 10x  10x 10x     Stand hip 3 way      20x 20x                                        Ther Activity                                   Gait Training                                   Modalities

## 2022-07-14 ENCOUNTER — OFFICE VISIT (OUTPATIENT)
Dept: PHYSICAL THERAPY | Facility: CLINIC | Age: 72
End: 2022-07-14
Payer: MEDICARE

## 2022-07-14 DIAGNOSIS — G89.29 CHRONIC MIDLINE LOW BACK PAIN WITH RIGHT-SIDED SCIATICA: Primary | ICD-10-CM

## 2022-07-14 DIAGNOSIS — M54.41 CHRONIC MIDLINE LOW BACK PAIN WITH RIGHT-SIDED SCIATICA: Primary | ICD-10-CM

## 2022-07-14 PROCEDURE — 97140 MANUAL THERAPY 1/> REGIONS: CPT

## 2022-07-14 PROCEDURE — 97110 THERAPEUTIC EXERCISES: CPT

## 2022-07-14 NOTE — PROGRESS NOTES
Daily Note     Today's date: 2022  Patient name: Sp Us  : 1950  MRN: [id number]  Referring provider: Delpha Romberg, DO  Dx:   Encounter Diagnosis     ICD-10-CM    1  Chronic midline low back pain with right-sided sciatica  M54 41     G89 29                   Subjective: Patient notes her back actually feels okay today  Objective: See treatment diary below      Assessment: Tolerated treatment well  Patient required frequent breaks for rest between sets due to fatigue  Fair tolerance to MFR with moderate relief noted following  Educated patient on role of TpR and MFR in POC and how it works- she verbalized understanding  Patient would benefit from continued PT to increase trunk mobility and core strength for improved function in ADLs  Plan: Continue per plan of care  Precautions: recent hospitalization; blood clotting; Hx of pulmonary embolus;  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     Manuals    MFR to lumbar paraspinals and QL 15'  15' 15' 10' + piriformis 15' 15'                                       Neuro Re-Ed           Core Brace 10" hd 10x  10 10 10 10 10x   TA+ March 20x20x  20x 20x 20x 20x 20x   TA + SLR 20x  20x 20x 20x 20x 20x   TA set +bridge  15x  15x 25x 25x 25x 25x   Isometric abdominal curl in supine  10x  10x    10x  Physio ball 10x physico ball 10x physico ball 15physico ball                                       Ther Ex           TM 6 min  1 2 mph  6 min  1 2 mph 6 min   1 2 mph 6 min 1 2 mph 6 min 1 2 mph 6 min 1 4 mph   LTR 3" hd 20x  20x 20x 20x 20x 20x   Hip add sqz 10" hd 15x  15x 15x 20x 20x 20x   Supine Sciatic N  Glide R only L3 20x  L3 20x  L3 20x  L3 20x L3 20x L3 20x   Stand L/S ext 10" hd 10x  10x 10x  10x 10x 10x    Stand hip 3 way      20x 20x 20x ea                                       Ther Activity                                   Gait Training                                   Modalities

## 2022-07-18 ENCOUNTER — OFFICE VISIT (OUTPATIENT)
Dept: PHYSICAL THERAPY | Facility: CLINIC | Age: 72
End: 2022-07-18
Payer: MEDICARE

## 2022-07-18 DIAGNOSIS — M54.41 CHRONIC MIDLINE LOW BACK PAIN WITH RIGHT-SIDED SCIATICA: Primary | ICD-10-CM

## 2022-07-18 DIAGNOSIS — G89.29 CHRONIC MIDLINE LOW BACK PAIN WITH RIGHT-SIDED SCIATICA: Primary | ICD-10-CM

## 2022-07-18 PROCEDURE — 97140 MANUAL THERAPY 1/> REGIONS: CPT

## 2022-07-18 PROCEDURE — 97110 THERAPEUTIC EXERCISES: CPT

## 2022-07-18 NOTE — PROGRESS NOTES
Daily Note     Today's date: 2022  Patient name: Oxana Leavitt  : 1950  MRN: [id number]  Referring provider: Varinder Head DO  Dx:   Encounter Diagnosis     ICD-10-CM    1  Chronic midline low back pain with right-sided sciatica  M54 41     G89 29                   Subjective: patient stated she has been feeling good and that the therapist filling in last week did a good job with manual on her back  Objective: See treatment diary below      Assessment: Patient tolerated treatment well  No exacerbation of sx with program  Patient able to complete all tasks assigned with good execution  Patient would benefit from continued therapy to decrease pain and increase strength and flexibility to improve LOF  Plan: Continue per plan of care  Progress treatment as tolerated  Precautions: recent hospitalization; blood clotting; Hx of pulmonary embolus;  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     Manuals    MFR to lumbar paraspinals and QL 15'  15' 10' + piriformis 15' 15'                                    Neuro Re-Ed          Core Brace 10" hd 10x  10 10 10 10x   TA+ March 20x  20x 20x 20x 20x   TA + SLR 2x10  20x 20x 20x 20x   TA set +bridge  15x  25x 25x 25x 25x   Isometric abdominal curl in supine  15x physio ball   10x  Physio ball 10x physico ball 10x physico ball 15physico ball                                    Ther Ex          TM 7 min  1 4 mph  6 min   1 2 mph 6 min 1 2 mph 6 min 1 2 mph 6 min 1 4 mph   LTR 3" hd 20x  20x 20x 20x 20x   Hip add sqz 10" hd 15x  15x 20x 20x 20x   Supine Sciatic N  Glide R only L3 20x  L3 20x  L3 20x L3 20x L3 20x   Stand L/S ext 10" hd 10x  10x  10x 10x 10x    Stand hip 3 way  20x   #1   20x 20x 20x ea                                    Ther Activity                                Gait Training                                Modalities

## 2022-07-21 ENCOUNTER — OFFICE VISIT (OUTPATIENT)
Dept: PHYSICAL THERAPY | Facility: CLINIC | Age: 72
End: 2022-07-21
Payer: MEDICARE

## 2022-07-21 DIAGNOSIS — M54.41 CHRONIC MIDLINE LOW BACK PAIN WITH RIGHT-SIDED SCIATICA: Primary | ICD-10-CM

## 2022-07-21 DIAGNOSIS — G89.29 CHRONIC MIDLINE LOW BACK PAIN WITH RIGHT-SIDED SCIATICA: Primary | ICD-10-CM

## 2022-07-21 PROCEDURE — 97110 THERAPEUTIC EXERCISES: CPT | Performed by: PHYSICAL THERAPIST

## 2022-07-21 PROCEDURE — 97112 NEUROMUSCULAR REEDUCATION: CPT | Performed by: PHYSICAL THERAPIST

## 2022-07-21 PROCEDURE — 97140 MANUAL THERAPY 1/> REGIONS: CPT | Performed by: PHYSICAL THERAPIST

## 2022-07-21 NOTE — PROGRESS NOTES
Daily Note     Today's date: 2022  Patient name: Eric Bauer  : 1950  MRN: [id number]  Referring provider: Susanna Alvarez DO  Dx:   Encounter Diagnosis     ICD-10-CM    1  Chronic midline low back pain with right-sided sciatica  M54 41     G89 29                   Subjective: Patient reports she is very tired today  Objective: See treatment diary below      Assessment: Patient tolerated treatment well  She demonstrates good technique throughout the session with minimal cues required  She was able to complete program as noted below with no reports of increased symptoms  She would benefit from continuing physical therapy to improve pain levels and functional abilities  Plan: Continue per plan of care  Precautions: recent hospitalization; blood clotting; Hx of pulmonary embolus;  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     Manuals    MFR to lumbar paraspinals and QL 15'  SK   15' 15'                                    Neuro Re-Ed          Core Brace 10" hd 10x  x10   10 10x   TA+ March 20x  x20   20x 20x   TA + SLR 2x10  2x10   20x 20x   TA set +bridge  15x  x20   25x 25x   Isometric abdominal curl in supine  15x physio ball   15x pball   10x physico ball 15physico ball                                    Ther Ex          TM 7 min  1 4 mph  7 min   1 4 mph   6 min 1 2 mph 6 min 1 4 mph   LTR 3" hd 20x  x20   20x 20x   Hip add sqz 10" hd 15x  x20   20x 20x   Supine Sciatic N  Glide R only L3 20x  L3 x20   L3 20x L3 20x   Stand L/S ext 10" hd 10x  x10   10x 10x    Stand hip 3 way  20x   #1  1# x20   20x 20x ea                                    Ther Activity                                Gait Training                                Modalities

## 2022-07-25 ENCOUNTER — APPOINTMENT (OUTPATIENT)
Dept: LAB | Facility: CLINIC | Age: 72
End: 2022-07-25
Payer: MEDICARE

## 2022-07-25 ENCOUNTER — OFFICE VISIT (OUTPATIENT)
Dept: PHYSICAL THERAPY | Facility: CLINIC | Age: 72
End: 2022-07-25
Payer: MEDICARE

## 2022-07-25 DIAGNOSIS — M06.4 INFLAMMATORY POLYARTHROPATHY (HCC): ICD-10-CM

## 2022-07-25 DIAGNOSIS — Z79.899 ENCOUNTER FOR LONG-TERM (CURRENT) USE OF OTHER MEDICATIONS: ICD-10-CM

## 2022-07-25 DIAGNOSIS — G89.29 CHRONIC MIDLINE LOW BACK PAIN WITH RIGHT-SIDED SCIATICA: Primary | ICD-10-CM

## 2022-07-25 DIAGNOSIS — M54.41 CHRONIC MIDLINE LOW BACK PAIN WITH RIGHT-SIDED SCIATICA: Primary | ICD-10-CM

## 2022-07-25 LAB
ALBUMIN SERPL BCP-MCNC: 3.1 G/DL (ref 3.5–5)
ALP SERPL-CCNC: 94 U/L (ref 46–116)
ALT SERPL W P-5'-P-CCNC: 24 U/L (ref 12–78)
ANION GAP SERPL CALCULATED.3IONS-SCNC: 6 MMOL/L (ref 4–13)
AST SERPL W P-5'-P-CCNC: 18 U/L (ref 5–45)
BASOPHILS # BLD AUTO: 0.03 THOUSANDS/ΜL (ref 0–0.1)
BASOPHILS NFR BLD AUTO: 0 % (ref 0–1)
BILIRUB SERPL-MCNC: 0.78 MG/DL (ref 0.2–1)
BUN SERPL-MCNC: 19 MG/DL (ref 5–25)
CALCIUM ALBUM COR SERPL-MCNC: 10 MG/DL (ref 8.3–10.1)
CALCIUM SERPL-MCNC: 9.3 MG/DL (ref 8.3–10.1)
CHLORIDE SERPL-SCNC: 111 MMOL/L (ref 96–108)
CO2 SERPL-SCNC: 24 MMOL/L (ref 21–32)
CREAT SERPL-MCNC: 0.78 MG/DL (ref 0.6–1.3)
CRP SERPL QL: 7.5 MG/L
EOSINOPHIL # BLD AUTO: 0.11 THOUSAND/ΜL (ref 0–0.61)
EOSINOPHIL NFR BLD AUTO: 1 % (ref 0–6)
ERYTHROCYTE [DISTWIDTH] IN BLOOD BY AUTOMATED COUNT: 13.6 % (ref 11.6–15.1)
ERYTHROCYTE [SEDIMENTATION RATE] IN BLOOD: 44 MM/HOUR (ref 0–29)
GFR SERPL CREATININE-BSD FRML MDRD: 77 ML/MIN/1.73SQ M
GLUCOSE P FAST SERPL-MCNC: 90 MG/DL (ref 65–99)
HCT VFR BLD AUTO: 40.6 % (ref 34.8–46.1)
HGB BLD-MCNC: 12.9 G/DL (ref 11.5–15.4)
IMM GRANULOCYTES # BLD AUTO: 0.03 THOUSAND/UL (ref 0–0.2)
IMM GRANULOCYTES NFR BLD AUTO: 0 % (ref 0–2)
LYMPHOCYTES # BLD AUTO: 2.38 THOUSANDS/ΜL (ref 0.6–4.47)
LYMPHOCYTES NFR BLD AUTO: 26 % (ref 14–44)
MCH RBC QN AUTO: 31.3 PG (ref 26.8–34.3)
MCHC RBC AUTO-ENTMCNC: 31.8 G/DL (ref 31.4–37.4)
MCV RBC AUTO: 99 FL (ref 82–98)
MONOCYTES # BLD AUTO: 0.69 THOUSAND/ΜL (ref 0.17–1.22)
MONOCYTES NFR BLD AUTO: 7 % (ref 4–12)
NEUTROPHILS # BLD AUTO: 6.1 THOUSANDS/ΜL (ref 1.85–7.62)
NEUTS SEG NFR BLD AUTO: 66 % (ref 43–75)
NRBC BLD AUTO-RTO: 0 /100 WBCS
PLATELET # BLD AUTO: 241 THOUSANDS/UL (ref 149–390)
PMV BLD AUTO: 11.8 FL (ref 8.9–12.7)
POTASSIUM SERPL-SCNC: 4 MMOL/L (ref 3.5–5.3)
PROT SERPL-MCNC: 7 G/DL (ref 6.4–8.4)
RBC # BLD AUTO: 4.12 MILLION/UL (ref 3.81–5.12)
SODIUM SERPL-SCNC: 141 MMOL/L (ref 135–147)
WBC # BLD AUTO: 9.34 THOUSAND/UL (ref 4.31–10.16)

## 2022-07-25 PROCEDURE — 97112 NEUROMUSCULAR REEDUCATION: CPT

## 2022-07-25 PROCEDURE — 85025 COMPLETE CBC W/AUTO DIFF WBC: CPT

## 2022-07-25 PROCEDURE — 97140 MANUAL THERAPY 1/> REGIONS: CPT

## 2022-07-25 PROCEDURE — 36415 COLL VENOUS BLD VENIPUNCTURE: CPT

## 2022-07-25 PROCEDURE — 80053 COMPREHEN METABOLIC PANEL: CPT

## 2022-07-25 PROCEDURE — 86140 C-REACTIVE PROTEIN: CPT

## 2022-07-25 PROCEDURE — 97110 THERAPEUTIC EXERCISES: CPT

## 2022-07-25 PROCEDURE — 85652 RBC SED RATE AUTOMATED: CPT

## 2022-07-25 NOTE — PROGRESS NOTES
Daily Note     Today's date: 2022  Patient name: Rebekah Clay  : 1950  MRN: [id number]  Referring provider: Sadia Hutchins DO  Dx:   Encounter Diagnosis     ICD-10-CM    1  Chronic midline low back pain with right-sided sciatica  M54 41     G89 29                   Subjective: Patient reports she does feel some increase in strength  Objective: See treatment diary below      Assessment: Tolerated treatment well  Patient requested no weight be added with hip 4 way due to increased soreness and pain  Plan: Progress treatment as tolerated  Precautions: recent hospitalization; blood clotting; Hx of pulmonary embolus;  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     Manuals    MFR to lumbar paraspinals and QL 15'  SK 15'  15' 15'                                    Neuro Re-Ed          Core Brace 10" hd 10x  x10 x10  10 10x   TA+ March 20x  x20 x20  20x 20x   TA + SLR 2x10  2x10 2x10  20x 20x   TA set +bridge  15x  x20 25x  25x 25x   Isometric abdominal curl in supine  15x physio ball   15x pball 15x PB  10x physico ball 15physico ball                                    Ther Ex          TM 7 min  1 4 mph  7 min   1 4 mph 7 min 1 4 mph  6 min 1 2 mph 6 min 1 4 mph   LTR 3" hd 20x  x20 20x  20x 20x   Hip add sqz 10" hd 15x  x20 20x  20x 20x   Supine Sciatic N  Glide R only L3 20x  L3 x20 L3 20x  L3 20x L3 20x   Stand L/S ext 10" hd 10x  x10 10x  10x 10x    Stand hip 3 way  20x   #1  1# x20 20x (no wt- declined)  20x 20x ea                                    Ther Activity                                Gait Training                                Modalities

## 2022-07-26 ENCOUNTER — HOSPITAL ENCOUNTER (OUTPATIENT)
Dept: BONE DENSITY | Facility: HOSPITAL | Age: 72
Discharge: HOME | End: 2022-07-26
Payer: MEDICARE

## 2022-07-26 DIAGNOSIS — Z79.52 LONG TERM CURRENT USE OF SYSTEMIC STEROIDS: ICD-10-CM

## 2022-07-26 PROCEDURE — 77080 DXA BONE DENSITY AXIAL: CPT

## 2022-07-28 ENCOUNTER — OFFICE VISIT (OUTPATIENT)
Dept: PHYSICAL THERAPY | Facility: CLINIC | Age: 72
End: 2022-07-28
Payer: MEDICARE

## 2022-07-28 DIAGNOSIS — M54.41 CHRONIC MIDLINE LOW BACK PAIN WITH RIGHT-SIDED SCIATICA: Primary | ICD-10-CM

## 2022-07-28 DIAGNOSIS — G89.29 CHRONIC MIDLINE LOW BACK PAIN WITH RIGHT-SIDED SCIATICA: Primary | ICD-10-CM

## 2022-07-28 PROCEDURE — 97140 MANUAL THERAPY 1/> REGIONS: CPT

## 2022-07-28 PROCEDURE — 97110 THERAPEUTIC EXERCISES: CPT

## 2022-07-28 NOTE — PROGRESS NOTES
Daily Note     Today's date: 2022  Patient name: Era Hodge  : 1950  MRN: [id number]  Referring provider: Zandra Sutherland DO  Dx:   Encounter Diagnosis     ICD-10-CM    1  Chronic midline low back pain with right-sided sciatica  M54 41     G89 29        Start Time: 1100  Stop Time: 1155  Total time in clinic (min): 55 minutes    Subjective: Patient notes that her back pain is because of the arthritis  Objective: See treatment diary below    Assessment: Tolerated treatment well  Patient able to complete all TE listed below with no increase in pain  Plan: Progress treatment as tolerated  Precautions: recent hospitalization; blood clotting; Hx of pulmonary embolus;  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     Manuals      MFR to lumbar paraspinals and QL 15'  SK 15' 15'                                      Neuro Re-Ed          Core Brace 10" hd 10x  x10 x10  x10     TA+ March 20x  x20 x20 x20     TA + SLR 2x10  2x10 2x10 2x10     TA set +bridge  15x  x20 25x 25x     Isometric abdominal curl in supine  15x physio ball   15x pball 15x PB 15x PB                                      Ther Ex          TM 7 min  1 4 mph  7 min   1 4 mph 7 min 1 4 mph 7 min 1 4 mph     LTR 3" hd 20x  x20 20x 20x     Hip add sqz 10" hd 15x  x20 20x 20x     Supine Sciatic N  Glide R only L3 20x  L3 x20 L3 20x L3 20x     Stand L/S ext 10" hd 10x  x10 10x 10x      Stand hip 3 way  20x   #1  1# x20 20x (no wt- declined) 20x ea                                      Ther Activity                                Gait Training                                Modalities

## 2022-08-01 ENCOUNTER — APPOINTMENT (OUTPATIENT)
Dept: PHYSICAL THERAPY | Facility: CLINIC | Age: 72
End: 2022-08-01
Payer: MEDICARE

## 2022-08-02 ENCOUNTER — EVALUATION (OUTPATIENT)
Dept: PHYSICAL THERAPY | Facility: CLINIC | Age: 72
End: 2022-08-02
Payer: MEDICARE

## 2022-08-02 ENCOUNTER — APPOINTMENT (OUTPATIENT)
Dept: LAB | Facility: CLINIC | Age: 72
End: 2022-08-02
Payer: MEDICARE

## 2022-08-02 DIAGNOSIS — Z11.7 ENCOUNTER FOR TESTING FOR LATENT TUBERCULOSIS: ICD-10-CM

## 2022-08-02 DIAGNOSIS — G89.29 CHRONIC MIDLINE LOW BACK PAIN WITH RIGHT-SIDED SCIATICA: Primary | ICD-10-CM

## 2022-08-02 DIAGNOSIS — Z76.89 PERSONS ENCOUNTERING HEALTH SERVICES IN OTHER SPECIFIED CIRCUMSTANCES: ICD-10-CM

## 2022-08-02 DIAGNOSIS — M54.41 CHRONIC MIDLINE LOW BACK PAIN WITH RIGHT-SIDED SCIATICA: Primary | ICD-10-CM

## 2022-08-02 DIAGNOSIS — Z11.59 SPECIAL SCREENING EXAMINATION FOR VIRAL DISEASE: ICD-10-CM

## 2022-08-02 DIAGNOSIS — M54.59 OTHER LOW BACK PAIN: ICD-10-CM

## 2022-08-02 LAB
HBV SURFACE AB SER-ACNC: <3.1 MIU/ML
HBV SURFACE AG SER QL: NORMAL
HCV AB SER QL: NORMAL

## 2022-08-02 PROCEDURE — 87340 HEPATITIS B SURFACE AG IA: CPT

## 2022-08-02 PROCEDURE — 86706 HEP B SURFACE ANTIBODY: CPT

## 2022-08-02 PROCEDURE — 36415 COLL VENOUS BLD VENIPUNCTURE: CPT

## 2022-08-02 PROCEDURE — 86480 TB TEST CELL IMMUN MEASURE: CPT

## 2022-08-02 PROCEDURE — 86803 HEPATITIS C AB TEST: CPT

## 2022-08-02 PROCEDURE — 97110 THERAPEUTIC EXERCISES: CPT | Performed by: PHYSICAL THERAPIST

## 2022-08-02 PROCEDURE — 97140 MANUAL THERAPY 1/> REGIONS: CPT | Performed by: PHYSICAL THERAPIST

## 2022-08-02 NOTE — LETTER
August 3, 2022    José Miguel Stovall DO  [address] [facility]    Patient: Teresa Bosch   YOB: 1950   Date of Visit: 2022     Encounter Diagnosis     ICD-10-CM    1  Chronic midline low back pain with right-sided sciatica  M54 41     G89 29        Dear Dr Sadia Stoner:    Thank you for your recent referral of Teresa Bosch  Please review the attached evaluation summary from Raquel's recent visit  Please verify that you agree with the plan of care by signing the attached order  If you have any questions or concerns, please do not hesitate to call  I sincerely appreciate the opportunity to share in the care of one of your patients and hope to have another opportunity to work with you in the near future  Sincerely,    Diego Dunn, PT      Referring Provider:      I certify that I have read the below Plan of Care and certify the need for these services furnished under this plan of treatment while under my care  José Miguel Stovall, [address] [facility] [facility] [address]  Via Fax: [phone]          PT Re-Evaluation     Today's date: 2022  Patient name: Teresa Bosch  : 1950  MRN: [id number]  Referring provider: Char Banda DO  Dx:   Encounter Diagnosis     ICD-10-CM    1  Chronic midline low back pain with right-sided sciatica  M54 41     G89 29                   Assessment  Assessment details: Patient is a 81 y o  female who reports to outpatient physical therapy with referral for LBP  Probable movement impairment diagnosis of lumbar derangement resulting in pathoanatomical symptoms of R sided low back pain with sciatica and limiting ability to sit/stand for extended periods without pain  Patient demonstrates improving strength and ROM  Pt with Left lateral shift  Patient fatigues quickly with activity, but reports improving endurance with activities at home    Recent past medical history significant for blood clotting with pulmonary embolus causing hospitalization  Patient demonstrates improved ROM and flexibility  Skilled physical therapy is warranted for the application of the interventions found below in the planned intervention section  Prognosis is good given HEP compliance and attendance to physical therapy 2x a week for 8 weeks  Please contact me if you have any questions or recommendations  Thank you for the referral and the opportunity to share in [facility]  Patient verbalized understanding of POC, HEP, and return demonstrated HEP  Impairments: abnormal or restricted ROM, activity intolerance, impaired physical strength, lacks appropriate home exercise program, pain with function and poor posture     Goals  STG (4 weeks)  1  Patient will have reported <3/10 pain in R LE/low back at rest   - MET  2  Improve patient's R passive SLR to >45 degrees for increased ability to take proper strides during ambulation  - Not MET  3  Increase patient's R single leg balance to >10 seconds for increased stability on stairs  - Not MET  4  Patient will increase walking tolerance to 20 min  - MET    LTG (8 weeks)  1  Patient's LE strength will be equal bilaterally for ability to ambulate and return to functional activities at PLOF  2  Patient will be able to complete normal daily activity with 0/10 pain in lower back  - Partially MET  3  Patient will be independent with home exercise program for continued maintenance post PT discharge  - Progressing  4  Patient will increase walking tolerance to >30 min  - Partially MET      Plan  Plan details: Lumbar Stabilization/core strengthening  Correction of Lateral Shift  General LE conditioning, cardiovascular training     Patient would benefit from: skilled physical therapy  Planned therapy interventions: manual therapy, neuromuscular re-education, patient education, therapeutic activities, therapeutic exercise, home exercise program, gait training and balance  Frequency: 2x week  Duration in weeks: 8  Plan of Care beginning date: 2022  Plan of Care expiration date: 2022  Treatment plan discussed with: patient        Subjective Evaluation    History of Present Illness  Mechanism of injury: Keyshawn Franco presents to outpatient physical therapy with a referral for low back pain  Keyshawn Franco reports pain started in November and ended up finding out she had 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   Patient drove to [place] in April which set off back pain, had pain radiating into R LE      HPI/Primary Complaint: midline to R sided LBP; R LE pain is on and off; thinks her arthritis causes stiffness; has not been doing much walking recently; R LE pain stopped at knee, nothing into calf; gets out of breathe quickly with not having walked much recently; denies NT into LE  Walking tolerance= 10min   Relevant PMH: hospitalization d/t pulmonary embolus   Work/School: retired  Aggravating factors: standing and doing dishes; walking far; lays on side at night  Activity Limitations: walking longer distances  Patient Goals: Improve energy and endurance    Pain  Current pain ratin  At best pain ratin  At worst pain rating: 3  Quality: tight  Aggravating factors: sitting  Progression: improved    Social Support  Steps to enter house: yes (1 ALYSSA)  Stairs in house: no   Lives in: [facility]  Lives with: spouse    Treatments  Current treatment: medication and physical therapy  Patient Goals  Patient goals for therapy: decreased pain, increased motion, increased strength, independence with ADLs/IADLs and return to sport/leisure activities          Objective     General Comments:      Lumbar Comments  Sitting Posture: forward lean    Palpation: TTP R lumbar paraspinals/QL    Movement Analysis:   Thoracolumbar AROM:   Flex: 99   Ext: 8  Lateral Flex: R: 19, L: 26  Rotation: R: WFL, L: WFL  Lateral Shift to the Left    Repeated Motions:   Flex: no change  Ext: no change    Functional Assessment:   Core: 3/5    MMTs:  Hip flex (L1-L2): R: 4/5, L: 4-/5  Knee ext (L3-L4): R: 4+/5, L: 4+/5  Knee flex (S2-S3): R: 4+/5, L: 4/5  Sidelying Hip Abd (L4-S1): R: 2+/5, L: 2+/5     Flexibility:   HS:   R:-13   L: -22    Ely's: R:+,    Clinical Examination Tests:  SEBASTIAN: + R LBP  Neural tension/irritation: SLR test: - B;    Slump test: - B    SLS:  L: 4 53"  R: 16 20"             Precautions: recent hospitalization; blood clotting; Hx of pulmonary embolus;  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     Manuals 7/18 7/21 7/25 8/2 7/11 7/14   MFR to lumbar paraspinals and QL 15'  SK 15'  15' 15' 15'                                                   Neuro Re-Ed               Core Brace 10" hd 10x  x10 x10   10 10x   TA+ March 20x  x20 x20   20x 20x   TA + SLR 2x10  2x10 2x10   20x 20x   TA set +bridge  15x  x20 25x   25x 25x   Isometric abdominal curl in supine  15x physio ball   15x pball 15x PB   10x physico ball 15physico ball    Wall Leans (L side lateral shift correction)                                               Ther Ex               TM 7 min  1 4 mph  7 min   1 4 mph 7 min 1 4 mph  7 min   1 4 mph 6 min 1 2 mph 6 min 1 4 mph   LTR 3" hd 20x  x20 20x   20x 20x   Hip add sqz 10" hd 15x  x20 20x   20x 20x   Supine Sciatic N  Glide R only L3 20x  L3 x20 L3 20x   L3 20x L3 20x   Stand L/S ext 10" hd 10x  x10 10x   10x 10x    Stand hip 3 way  20x   #1  1# x20 20x (no wt- declined)   20x 20x ea                                                   Ther Activity                                               Gait Training                                               Modalities

## 2022-08-02 NOTE — PROGRESS NOTES
PT Re-Evaluation     Today's date: 2022  Patient name: Angi Silva  : 1950  MRN: [id number]  Referring provider: Mariah Sylvester DO  Dx:   Encounter Diagnosis     ICD-10-CM    1  Chronic midline low back pain with right-sided sciatica  M54 41     G89 29                   Assessment  Assessment details: Patient is a 81 y o  female who reports to outpatient physical therapy with referral for LBP  Probable movement impairment diagnosis of lumbar derangement resulting in pathoanatomical symptoms of R sided low back pain with sciatica and limiting ability to sit/stand for extended periods without pain  Patient demonstrates improving strength and ROM  Pt with Left lateral shift  Patient fatigues quickly with activity, but reports improving endurance with activities at home  Recent past medical history significant for blood clotting with pulmonary embolus causing hospitalization  Patient demonstrates improved ROM and flexibility  Skilled physical therapy is warranted for the application of the interventions found below in the planned intervention section  Prognosis is good given HEP compliance and attendance to physical therapy 2x a week for 8 weeks  Please contact me if you have any questions or recommendations  Thank you for the referral and the opportunity to share in [facility]  Patient verbalized understanding of POC, HEP, and return demonstrated HEP  Impairments: abnormal or restricted ROM, activity intolerance, impaired physical strength, lacks appropriate home exercise program, pain with function and poor posture     Goals  STG (4 weeks)  1  Patient will have reported <3/10 pain in R LE/low back at rest   - MET  2  Improve patient's R passive SLR to >45 degrees for increased ability to take proper strides during ambulation  - Not MET  3  Increase patient's R single leg balance to >10 seconds for increased stability on stairs  - Not MET  4   Patient will increase walking tolerance to 20 min  - MET    LTG (8 weeks)  1  Patient's LE strength will be equal bilaterally for ability to ambulate and return to functional activities at OF  2  Patient will be able to complete normal daily activity with 0/10 pain in lower back  - Partially MET  3  Patient will be independent with home exercise program for continued maintenance post PT discharge  - Progressing  4  Patient will increase walking tolerance to >30 min  - Partially MET      Plan  Plan details: Lumbar Stabilization/core strengthening  Correction of Lateral Shift  General LE conditioning, cardiovascular training  Patient would benefit from: skilled physical therapy  Planned therapy interventions: manual therapy, neuromuscular re-education, patient education, therapeutic activities, therapeutic exercise, home exercise program, gait training and balance  Frequency: 2x week  Duration in weeks: 8  Plan of Care beginning date: 6/18/2022  Plan of Care expiration date: 8/18/2022  Treatment plan discussed with: patient        Subjective Evaluation    History of Present Illness  Mechanism of injury: Irvin Stubbs presents to outpatient physical therapy with a referral for low back pain  Irvin Stubbs reports pain started in November and ended up finding out she had 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   Patient drove to [place] in April which set off back pain, had pain radiating into R LE      HPI/Primary Complaint: midline to R sided LBP; R LE pain is on and off; thinks her arthritis causes stiffness; has not been doing much walking recently; R LE pain stopped at knee, nothing into calf; gets out of breathe quickly with not having walked much recently; denies NT into LE  Walking tolerance= 10min   Relevant PMH: hospitalization d/t pulmonary embolus   Work/School: retired  Aggravating factors: standing and doing dishes; walking far; lays on side at night  Activity Limitations: walking longer distances  Patient Goals: Improve energy and endurance    Pain  Current pain ratin  At best pain ratin  At worst pain rating: 3  Quality: tight  Aggravating factors: sitting  Progression: improved    Social Support  Steps to enter house: yes (1 ALYSSA)  Stairs in house: no   Lives in: [place] house  Lives with: spouse    Treatments  Current treatment: medication and physical therapy  Patient Goals  Patient goals for therapy: decreased pain, increased motion, increased strength, independence with ADLs/IADLs and return to sport/leisure activities          Objective     General Comments:      Lumbar Comments  Sitting Posture: forward lean    Palpation: TTP R lumbar paraspinals/QL    Movement Analysis:   Thoracolumbar AROM:   Flex: 99   Ext: 8  Lateral Flex: R: 19, L: 26  Rotation: R: WFL, L: WFL  Lateral Shift to the Left    Repeated Motions:   Flex: no change  Ext: no change    Functional Assessment:   Core: 3/5    MMTs:  Hip flex (L1-L2): R: 4/5, L: 4-/5  Knee ext (L3-L4): R: 4+/5, L: 4+/5  Knee flex (S2-S3): R: 4+/5, L: 4/5  Sidelying Hip Abd (L4-S1): R: 2+/5, L: 2+/5     Flexibility:   HS:   R:-13   L: -22    Ely's: R:+,    Clinical Examination Tests:  SEBASTIAN: + R LBP  Neural tension/irritation: SLR test: - B;    Slump test: - B    SLS:  L: 4 53"  R: 16 20"             Precautions: recent hospitalization; blood clotting; Hx of pulmonary embolus;  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     Manuals   8/   MFR to lumbar paraspinals and QL 15'  SK 15'  15' 15' 15'                                                   Neuro Re-Ed               Core Brace 10" hd 10x  x10 x10   10 10x   TA+ March 20x  x20 x20   20x 20x   TA + SLR 2x10  2x10 2x10   20x 20x   TA set +bridge  15x  x20 25x   25x 25x   Isometric abdominal curl in supine  15x physio ball   15x pball 15x PB   10x physico ball 15physico ball    Wall Leans (L side lateral shift correction)                                               Ther Ex               TM 7 min  1 4 mph  7 min   1 4 mph 7 min 1 4 mph  7 min   1 4 mph 6 min 1 2 mph 6 min 1 4 mph   LTR 3" hd 20x  x20 20x   20x 20x   Hip add sqz 10" hd 15x  x20 20x   20x 20x   Supine Sciatic N  Glide R only L3 20x  L3 x20 L3 20x   L3 20x L3 20x   Stand L/S ext 10" hd 10x  x10 10x   10x 10x    Stand hip 3 way  20x   #1  1# x20 20x (no wt- declined)   20x 20x ea                                                   Ther Activity                                               Gait Training                                               Modalities

## 2022-08-03 LAB
GAMMA INTERFERON BACKGROUND BLD IA-ACNC: 0.05 IU/ML
M TB IFN-G BLD-IMP: NEGATIVE
M TB IFN-G CD4+ BCKGRND COR BLD-ACNC: 0 IU/ML
M TB IFN-G CD4+ BCKGRND COR BLD-ACNC: 0 IU/ML
MITOGEN IGNF BCKGRD COR BLD-ACNC: 2.71 IU/ML

## 2022-08-04 ENCOUNTER — APPOINTMENT (OUTPATIENT)
Dept: PHYSICAL THERAPY | Facility: CLINIC | Age: 72
End: 2022-08-04
Payer: MEDICARE

## 2022-08-06 ENCOUNTER — APPOINTMENT (EMERGENCY)
Dept: RADIOLOGY | Facility: HOSPITAL | Age: 72
End: 2022-08-06
Payer: MEDICARE

## 2022-08-06 ENCOUNTER — HOSPITAL ENCOUNTER (EMERGENCY)
Facility: HOSPITAL | Age: 72
Discharge: HOME | End: 2022-08-06
Attending: EMERGENCY MEDICINE | Admitting: EMERGENCY MEDICINE
Payer: MEDICARE

## 2022-08-06 VITALS
HEART RATE: 80 BPM | OXYGEN SATURATION: 97 % | DIASTOLIC BLOOD PRESSURE: 82 MMHG | RESPIRATION RATE: 18 BRPM | TEMPERATURE: 98 F | SYSTOLIC BLOOD PRESSURE: 152 MMHG

## 2022-08-06 DIAGNOSIS — R07.81 RIB PAIN ON RIGHT SIDE: Primary | ICD-10-CM

## 2022-08-06 PROCEDURE — 93005 ELECTROCARDIOGRAM TRACING: CPT

## 2022-08-06 PROCEDURE — 99283 EMERGENCY DEPT VISIT LOW MDM: CPT

## 2022-08-06 PROCEDURE — 99284 EMERGENCY DEPT VISIT MOD MDM: CPT | Performed by: EMERGENCY MEDICINE

## 2022-08-06 PROCEDURE — 71101 X-RAY EXAM UNILAT RIBS/CHEST: CPT

## 2022-08-06 RX ORDER — SENNOSIDES 8.6 MG
650 CAPSULE ORAL EVERY 8 HOURS PRN
Qty: 30 TABLET | Refills: 0 | Status: SHIPPED | OUTPATIENT
Start: 2022-08-06

## 2022-08-06 RX ORDER — IBUPROFEN 600 MG/1
600 TABLET ORAL ONCE
Status: COMPLETED | OUTPATIENT
Start: 2022-08-06 | End: 2022-08-06

## 2022-08-06 RX ORDER — LIDOCAINE 50 MG/G
1 PATCH TOPICAL ONCE
Status: DISCONTINUED | OUTPATIENT
Start: 2022-08-06 | End: 2022-08-07 | Stop reason: HOSPADM

## 2022-08-06 RX ORDER — LIDOCAINE 50 MG/G
1 PATCH TOPICAL DAILY
Qty: 6 PATCH | Refills: 0 | Status: SHIPPED | OUTPATIENT
Start: 2022-08-06

## 2022-08-06 RX ADMIN — LIDOCAINE 5% 1 PATCH: 700 PATCH TOPICAL at 21:33

## 2022-08-06 RX ADMIN — IBUPROFEN 600 MG: 600 TABLET, FILM COATED ORAL at 21:34

## 2022-08-07 NOTE — ED PROVIDER NOTES
History  Chief Complaint   Patient presents with    Rib Pain     Patient was cleaning her house and twisted the wrong way and hurt her right rib area     Eric Bauer is a 81y o  year old female with PMH of PE on xarelto presenting to the [place] [address] ED for right side rib pain  Patient has had one day of right sided rib pain  Started suddenly while patient was doing work on her house twisted  Pain has been constant, moderate in intensity and nonradiating  Worse when taking a deep breath  The patient denies chest pain or trouble breathing  Patient denies associated fevers, cough, lightheadedness or abdominal pain  The patient has not taken/received any medications at home for relief of symptoms  Patient compliant with xarelto for PE  Patient also taking prednisone and methotrexate at direction of rheumatologist for intermittent hand swelling of unclear origin  History provided by:  Patient   used: No        Prior to Admission Medications   Prescriptions Last Dose Informant Patient Reported? Taking? LUMIGAN 0 01 % ophthalmic drops 8/5/2022 at Unknown time  Yes Yes   aspirin (ECOTRIN) 325 mg EC tablet   Yes No   Sig: Take 325 mg by mouth daily  methocarbamol (ROBAXIN) 500 mg tablet 8/5/2022 at Unknown time  No Yes   Sig: Take 1 tablet (500 mg total) by mouth 3 (three) times a day as needed for muscle spasms   methocarbamol (ROBAXIN) 750 mg tablet   No No   Sig: Take 1 tablet (750 mg total) by mouth every 8 (eight) hours for 20 doses   rivaroxaban (Xarelto Starter Pack) 15 & 20 MG starter pack 8/6/2022 at Unknown time  No Yes   Sig: Take 1 Package by mouth daily      Facility-Administered Medications: None       Past Medical History:   Diagnosis Date    Encephalitis     History of blood clot in brain        Past Surgical History:   Procedure Laterality Date    CHOLECYSTECTOMY      CYST REMOVAL Right     wrist    HEMORROIDECTOMY         History reviewed  No pertinent family history    I have reviewed and agree with the history as documented  E-Cigarette/Vaping    E-Cigarette Use Never User      E-Cigarette/Vaping Substances    Nicotine No     THC No     CBD No     Flavoring No     Other No     Unknown No      Social History     Tobacco Use    Smoking status: Former Smoker    Smokeless tobacco: Never Used   Vaping Use    Vaping Use: Never used   Substance Use Topics    Alcohol use: Not Currently    Drug use: Not Currently       Review of Systems   Constitutional: Negative for chills and fever  HENT: Negative for congestion and rhinorrhea  Eyes: Negative for visual disturbance  Respiratory: Negative for cough and shortness of breath  Cardiovascular: Negative for chest pain and leg swelling  Gastrointestinal: Negative for abdominal distention, abdominal pain, diarrhea, nausea and vomiting  Endocrine: Negative for polyuria  Genitourinary: Negative for difficulty urinating, dysuria and flank pain  Musculoskeletal: Positive for arthralgias (rib pain)  Negative for back pain, gait problem and myalgias  Skin: Negative for rash  Neurological: Negative for syncope and light-headedness  Psychiatric/Behavioral: Negative for behavioral problems and confusion  All other systems reviewed and are negative  Physical Exam  Physical Exam  Vitals and nursing note reviewed  Constitutional:       General: She is not in acute distress  Appearance: Normal appearance  She is well-developed  She is not ill-appearing, toxic-appearing or diaphoretic  HENT:      Head: Normocephalic and atraumatic  Nose: No congestion or rhinorrhea  Eyes:      General:         Right eye: No discharge  Left eye: No discharge  Cardiovascular:      Rate and Rhythm: Normal rate and regular rhythm  Pulmonary:      Effort: Pulmonary effort is normal  No accessory muscle usage or respiratory distress  Breath sounds: Normal breath sounds  No stridor   No decreased breath sounds, wheezing, rhonchi or rales  Chest:      Chest wall: Tenderness (right lateral lower ribs) present  No crepitus or edema  Abdominal:      General: There is no distension  Palpations: Abdomen is soft  Tenderness: There is no abdominal tenderness  There is no right CVA tenderness, left CVA tenderness, guarding or rebound  Musculoskeletal:      Cervical back: Normal range of motion and neck supple  No rigidity  Right lower leg: No tenderness  No edema  Left lower leg: No tenderness  No edema  Skin:     Capillary Refill: Capillary refill takes less than 2 seconds  Findings: No rash  Comments: No rash along the right chest   Neurological:      Mental Status: She is alert and oriented to person, place, and time     Psychiatric:         Mood and Affect: Mood normal          Behavior: Behavior normal          Vital Signs  ED Triage Vitals [08/06/22 2045]   Temperature Pulse Respirations Blood Pressure SpO2   98 °F (36 7 °C) 80 18 152/82 97 %      Temp Source Heart Rate Source Patient Position - Orthostatic VS BP Location FiO2 (%)   Tympanic Monitor Sitting Right arm --      Pain Score       4           Vitals:    08/06/22 2045   BP: 152/82   Pulse: 80   Patient Position - Orthostatic VS: Sitting         Visual Acuity      ED Medications  Medications   lidocaine (LIDODERM) 5 % patch 1 patch (1 patch Topical Medication Applied 8/6/22 2133)   ibuprofen (MOTRIN) tablet 600 mg (600 mg Oral Given 8/6/22 2134)       Diagnostic Studies  Results Reviewed     None                 XR ribs right w pa chest min 3 views    (Results Pending)              Procedures  Procedures         ED Course  ED Course as of 08/06/22 2325   Sat Aug 06, 2022   2132 Procedure Note: EKG  Date/Time: 08/06/22 9:32 PM   Interpreted by: Ambrosio Griffin DO  Indications / Diagnosis: right side rib pain  ECG reviewed by me, the ED Provider: yes   The EKG demonstrates:  Rhythm: normal sinus rhythm 74 BPM  Intervals: Normal KS and QT intervals  Axis: Normal axis  QRS/Blocks: Normal QRS  ST Changes: No acute ST/T waves changes  No ALYSSA  TWI III, V1-V2 noted on prior EKG  Comparison: Compared to prior EKG performed on 11/24/2021                                             [facility]  Number of Diagnoses or Management Options  Rib pain on right side  Diagnosis management comments:   81 y o  female presenting for right rib pain  VSS, nontoxic appearing  Point tenderness along lateral right ribs, will evaluate for rib fractures  DDx to includes intercostal strain  Doubt ACS, doubt PTX, doubt PE  Will check screening EKG as well  I have reviewed preliminary ED interpretation of x-rays with the patient  The patient understands that their x-rays will be interpreted independently by a radiologist at a later time  The patient is agreeable to discharge at this time and understands that they may be called back to the emergency department pending formal xray interpretation by radiology  I have discussed with the patient our plan to discharge them from the ED and the patient is in agreement with this plan  The patient was provided a written after visit summary with strict RTED precautions  Discharge Plan: PRN tylenol, lidocaine patch  Followup: I have discussed with the patient plan to follow up with their PCP   Contact information provided in AVS        Amount and/or Complexity of Data Reviewed  Clinical lab tests: ordered and reviewed  Tests in the radiology section of CPT®: ordered and reviewed  Review and summarize past medical records: yes  Independent visualization of images, tracings, or specimens: yes    Patient Progress  Patient progress: stable      Disposition  Final diagnoses:   Rib pain on right side     Time reflects when diagnosis was documented in both MDM as applicable and the Disposition within this note     Time User Action Codes Description Comment    8/6/2022  9:46 PM Arvin Brandt Add [R07 81] Rib pain on right side       ED Disposition     ED Disposition   Discharge    Condition   Stable    Date/Time   Sat Aug 6, 2022  9:46 PM    Comment   Anup Wright discharge to home/self care  Follow-up Information     Follow up With Specialties Details Why Contact Info    Frances0 Dwayne Morales DO Family Medicine Schedule an appointment as soon as possible for a visit   [phone] [facility]  [phone]            Discharge Medication List as of 8/6/2022  9:56 PM      START taking these medications    Details   acetaminophen (TYLENOL) 650 mg CR tablet Take 1 tablet (650 mg total) by mouth every 8 (eight) hours as needed for mild pain, Starting Sat 8/6/2022, Normal      lidocaine (Lidoderm) 5 % Apply 1 patch topically daily Remove & Discard patch within 12 hours or as directed by MD, Starting Sat 8/6/2022, Normal         CONTINUE these medications which have NOT CHANGED    Details   LUMIGAN 0 01 % ophthalmic drops Starting Mon 3/12/2018, Historical Med      methocarbamol (ROBAXIN) 500 mg tablet Take 1 tablet (500 mg total) by mouth 3 (three) times a day as needed for muscle spasms, Starting Wed 11/24/2021, Normal      rivaroxaban (Xarelto Starter Pack) 15 & 20 MG starter pack Take 1 Package by mouth daily, Starting Mon 11/22/2021, Normal      aspirin (ECOTRIN) 325 mg EC tablet Take 325 mg by mouth daily  , Until Discontinued, Historical Med             No discharge procedures on file      PDMP Review     None          ED Provider  Electronically Signed by           Henry Perry DO  08/06/22 9921

## 2022-08-07 NOTE — DISCHARGE INSTRUCTIONS
You have been seen for right side rib pain  Please take tylenol and use the provided lidocaine patch as needed for your symptoms  Return to the emergency department if you develop worsening pain, trouble breathing, lightheadedness, vomiting or any other symptoms of concern  Please follow up with your PCP by calling the number provided

## 2022-08-08 LAB
ATRIAL RATE: 74 BPM
P AXIS: 98 DEGREES
PR INTERVAL: 138 MS
QRS AXIS: -7 DEGREES
QRSD INTERVAL: 80 MS
QT INTERVAL: 342 MS
QTC INTERVAL: 379 MS
T WAVE AXIS: 6 DEGREES
VENTRICULAR RATE: 74 BPM

## 2022-08-08 PROCEDURE — 93010 ELECTROCARDIOGRAM REPORT: CPT | Performed by: INTERNAL MEDICINE

## 2022-08-10 ENCOUNTER — APPOINTMENT (OUTPATIENT)
Dept: PHYSICAL THERAPY | Facility: CLINIC | Age: 72
End: 2022-08-10
Payer: MEDICARE

## 2022-08-12 LAB — HLA-B27 QL NAA+PROBE: NORMAL

## 2022-08-16 ENCOUNTER — OFFICE VISIT (OUTPATIENT)
Dept: PHYSICAL THERAPY | Facility: CLINIC | Age: 72
End: 2022-08-16
Payer: MEDICARE

## 2022-08-16 DIAGNOSIS — G89.29 CHRONIC MIDLINE LOW BACK PAIN WITH RIGHT-SIDED SCIATICA: Primary | ICD-10-CM

## 2022-08-16 DIAGNOSIS — M54.41 CHRONIC MIDLINE LOW BACK PAIN WITH RIGHT-SIDED SCIATICA: Primary | ICD-10-CM

## 2022-08-16 PROCEDURE — 97140 MANUAL THERAPY 1/> REGIONS: CPT

## 2022-08-16 PROCEDURE — 97110 THERAPEUTIC EXERCISES: CPT

## 2022-08-16 PROCEDURE — 97112 NEUROMUSCULAR REEDUCATION: CPT

## 2022-08-16 NOTE — PROGRESS NOTES
Daily Note     Today's date: 2022  Patient name: Priscilla Francisco  : 1950  MRN: [id number]  Referring provider: Leydi Quintana DO  Dx:   Encounter Diagnosis     ICD-10-CM    1  Chronic midline low back pain with right-sided sciatica  M54 41     G89 29                   Subjective: Patient notes low back is not too bad, but she was in the ER a week and a half ago for "pulling" her ribs on the R side  She says she has some discomfort moving certain ways because of this, but she did not want to miss more PT  Objective: See treatment diary below      Assessment: Modified POC today- activity tolerance limited by R sided rib pain  Progress as able  Tolerated treatment well with no reports of increased low back pain  Moderate B/L PSIS tenderness noted during MFR  Patient would benefit from continued PT to increase low back mobility and strength for improved function in ADLs  Plan: Continue per plan of care  Precautions: recent hospitalization; blood clotting; Hx of pulmonary embolus;  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     Manuals    MFR to lumbar paraspinals and QL 15'  SK 15'    seated d/t ribs 15' 15'                                                Neuro Re-Ed              Core Brace 10" hd 10x  x10 x10  8x 10x   TA+ March 20x  x20 x20  20x 20x   TA + SLR 2x10  2x10 2x10  held 20x   TA set +bridge  15x  x20 25x  15x 25x   Isometric abdominal curl in supine  15x physio ball   15x pball 15x PB  held 15physico ball    Wall Leans (L side lateral shift correction)                                            Ther Ex              TM 7 min  1 4 mph  7 min   1 4 mph 7 min 1 4 mph  7 min   1 4 mph 7 min   1 4 mph 6 min 1 4 mph   LTR 3" hd 20x  x20 20x  20x 20x   Hip add sqz 10" hd 15x  x20 20x  12x 20x   Supine Sciatic N  Glide R only L3 20x  L3 x20 L3 20x  L3 20x L3 20x   Stand L/S ext 10" hd 10x  x10 10x  10x 10x    Stand hip 3 way  20x   #1  1# x20 20x (no wt- declined)  20x 0# 20x ea                                                Ther Activity                                            Gait Training                                               Modalities

## 2022-08-18 ENCOUNTER — OFFICE VISIT (OUTPATIENT)
Dept: PHYSICAL THERAPY | Facility: CLINIC | Age: 72
End: 2022-08-18
Payer: MEDICARE

## 2022-08-18 DIAGNOSIS — M54.41 CHRONIC MIDLINE LOW BACK PAIN WITH RIGHT-SIDED SCIATICA: Primary | ICD-10-CM

## 2022-08-18 DIAGNOSIS — G89.29 CHRONIC MIDLINE LOW BACK PAIN WITH RIGHT-SIDED SCIATICA: Primary | ICD-10-CM

## 2022-08-18 PROCEDURE — 97112 NEUROMUSCULAR REEDUCATION: CPT

## 2022-08-18 PROCEDURE — 97140 MANUAL THERAPY 1/> REGIONS: CPT

## 2022-08-18 PROCEDURE — 97110 THERAPEUTIC EXERCISES: CPT

## 2022-08-18 NOTE — PROGRESS NOTES
Daily Note     Today's date: 2022  Patient name: Letta Severs  : 1950  MRN: [id number]  Referring provider: Roxanne Baird DO  Dx:   Encounter Diagnosis     ICD-10-CM    1  Chronic midline low back pain with right-sided sciatica  M54 41     G89 29                   Subjective: Patient reports her back is very stiff today  She reports 6/10 stiffness and soreness at beginning of session  She notes rib pain has been improving  Objective: See treatment diary below      Assessment: Tolerated treatment well with no significant increase in low back pain  Better tolerance to exercises today compared to last session as rib pain continues to improve  Moderate verbal cues required to perform exercises with appropriate technique and intensity  Significant B/L piriformis tenderness noted with MFR  Patient would benefit from continued PT to increase trunk and mobility for improved function in ADLs  Plan: Continue per plan of care  Precautions: recent hospitalization; blood clotting; Hx of pulmonary embolus;  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     Manuals    MFR to lumbar paraspinals and QL 15'  SK 15'    seated d/t ribs 15' Prone 15'                                             Neuro Re-Ed             Core Brace 10" hd 10x  x10 x10  8x 10x   TA+ March 20x  x20 x20  20x 20x   TA + SLR 2x10  2x10 2x10  held    TA set +bridge  15x  x20 25x  15x 20x   Isometric abdominal curl in supine  15x physio ball   15x pball 15x PB  held 15x PB    Wall Leans (L side lateral shift correction)                                         Ther Ex             TM 7 min  1 4 mph  7 min   1 4 mph 7 min 1 4 mph  7 min   1 4 mph 7 min   1 4 mph 7 min   1 5 mph   LTR 3" hd 20x  x20 20x  20x 20x   Piriformis stretch 30" hold      3x bilat   Hip add sqz 10" hd 15x  x20 20x  12x 5"x20   Supine Sciatic N  Glide R only L3 20x  L3 x20 L3 20x  L3 20x L3 20x2   Stand L/S ext 10" hd 10x  x10 10x  10x 10x    Stand hip 3 way  20x   #1  1# x20 20x (no wt- declined)  20x 0# 20x                                             Ther Activity                                            Gait Training                                               Modalities

## 2022-08-22 ENCOUNTER — OFFICE VISIT (OUTPATIENT)
Dept: PHYSICAL THERAPY | Facility: CLINIC | Age: 72
End: 2022-08-22
Payer: MEDICARE

## 2022-08-22 DIAGNOSIS — G89.29 CHRONIC MIDLINE LOW BACK PAIN WITH RIGHT-SIDED SCIATICA: Primary | ICD-10-CM

## 2022-08-22 DIAGNOSIS — M54.41 CHRONIC MIDLINE LOW BACK PAIN WITH RIGHT-SIDED SCIATICA: Primary | ICD-10-CM

## 2022-08-22 PROCEDURE — 97140 MANUAL THERAPY 1/> REGIONS: CPT

## 2022-08-22 PROCEDURE — 97110 THERAPEUTIC EXERCISES: CPT

## 2022-08-22 PROCEDURE — 97112 NEUROMUSCULAR REEDUCATION: CPT

## 2022-08-22 NOTE — PROGRESS NOTES
Daily Note     Today's date: 2022  Patient name: Malcoml Henderson  : 1950  MRN: [id number]  Referring provider: Lamberto Cuenca DO  Dx:   Encounter Diagnosis     ICD-10-CM    1  Chronic midline low back pain with right-sided sciatica  M54 41     G89 29                   Subjective: Patient reports that she is doing well today with no new complaints  Notes that she has been compliant with her HEP  Objective: See treatment diary below      Assessment: Tolerated treatment well with no significant increase in low back pain  Increased tenderness was present in her b/l QLs  Re educated pt on the importance of self stretches at home  Patient would benefit from continued PT to increase trunk and mobility for improved function in ADLs  Plan: Continue per plan of care  Precautions: recent hospitalization; blood clotting; Hx of pulmonary embolus;  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     Manuals    MFR to lumbar paraspinals and QL Prone 15'  15'    seated d/t ribs 15' Prone 15'                                       Neuro Re-Ed           Core Brace 10" hd 10x  x10  8x 10x   TA+ March 20x  x20  20x 20x   TA + SLR   2x10  held    TA set +bridge  20x  25x  15x 20x   Isometric abdominal curl in supine  20x PB  15x PB  held 15x PB    Wall Leans (L side lateral shift correction)                                   Ther Ex           TM 7 min   1 5 mph  7 min 1 4 mph  7 min   1 4 mph 7 min   1 4 mph 7 min   1 5 mph   LTR 3" hd 20x  20x  20x 20x   Piriformis stretch 30" hold      3x bilat   Hip add sqz 10" hd 5"x20  20x  12x 5"x20   Supine Sciatic N  Glide R only L3 2x20  L3 20x  L3 20x L3 20x2   Stand L/S ext 10" hd 10x  10x  10x 10x    Stand hip 3 way 20x  20x (no wt- declined)  20x 0# 20x                                       Ther Activity                                      Gait Training                                         Modalities

## 2022-08-25 ENCOUNTER — OFFICE VISIT (OUTPATIENT)
Dept: PHYSICAL THERAPY | Facility: CLINIC | Age: 72
End: 2022-08-25
Payer: MEDICARE

## 2022-08-25 DIAGNOSIS — M54.41 CHRONIC MIDLINE LOW BACK PAIN WITH RIGHT-SIDED SCIATICA: Primary | ICD-10-CM

## 2022-08-25 DIAGNOSIS — G89.29 CHRONIC MIDLINE LOW BACK PAIN WITH RIGHT-SIDED SCIATICA: Primary | ICD-10-CM

## 2022-08-25 PROCEDURE — 97140 MANUAL THERAPY 1/> REGIONS: CPT | Performed by: PHYSICAL THERAPIST

## 2022-08-25 PROCEDURE — 97112 NEUROMUSCULAR REEDUCATION: CPT | Performed by: PHYSICAL THERAPIST

## 2022-08-25 PROCEDURE — 97110 THERAPEUTIC EXERCISES: CPT | Performed by: PHYSICAL THERAPIST

## 2022-08-25 NOTE — PROGRESS NOTES
Daily Note     Today's date: 2022  Patient name: Renny Wilson  : 1950  MRN: [id number]  Referring provider: Yasmin Coronado DO  Dx:   Encounter Diagnosis     ICD-10-CM    1  Chronic midline low back pain with right-sided sciatica  M54 41     G89 29                   Subjective: The patient states that she has stiffness this morning in her back  Notes more pain on R side of lower back vs L side  Objective: See treatment diary below      Assessment: Tolerated treatment well  Muscle tightness is noted along R sided lumbar PVMs and QL with MT today  No increase in pain noted during session  Patient reports feeling looser to end  Patient would benefit from continued PT  Plan: Continue per plan of care  Precautions: recent hospitalization; blood clotting; Hx of pulmonary embolus;  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     Manuals    MFR to lumbar paraspinals and QL Prone 15' Prone 15' 15'    seated d/t ribs 15' Prone 15'                                       Neuro Re-Ed           Core Brace 10" hd 10x 10x x10  8x 10x   TA+ March 20x 20x x20  20x 20x   TA + SLR   2x10  held    TA set +bridge  20x 20x 25x  15x 20x   Isometric abdominal curl in supine  20x PB 20x PB 15x PB  held 15x PB    Wall Leans (L side lateral shift correction)                                   Ther Ex           TM 7 min   1 5 mph 7 min  1 5 mph 7 min 1 4 mph  7 min   1 4 mph 7 min   1 4 mph 7 min   1 5 mph   LTR 3" hd 20x 20x 20x  20x 20x   Piriformis stretch 30" hold      3x bilat   Hip add sqz 10" hd 5"x20 5" x 20 20x  12x 5"x20   Supine Sciatic N  Glide R only L3 2x20 L3 2x20 L3 20x  L3 20x L3 20x2   Stand L/S ext 10" hd 10x 10x 10x  10x 10x    Stand hip 3 way 20x 20x ea 20x (no wt- declined)  20x 0# 20x                                       Ther Activity                                      Gait Training                                         Modalities

## 2022-08-29 ENCOUNTER — APPOINTMENT (OUTPATIENT)
Dept: PHYSICAL THERAPY | Facility: CLINIC | Age: 72
End: 2022-08-29
Payer: MEDICARE

## 2022-08-30 ENCOUNTER — OFFICE VISIT (OUTPATIENT)
Dept: PHYSICAL THERAPY | Facility: CLINIC | Age: 72
End: 2022-08-30
Payer: MEDICARE

## 2022-08-30 DIAGNOSIS — M54.41 CHRONIC MIDLINE LOW BACK PAIN WITH RIGHT-SIDED SCIATICA: Primary | ICD-10-CM

## 2022-08-30 DIAGNOSIS — G89.29 CHRONIC MIDLINE LOW BACK PAIN WITH RIGHT-SIDED SCIATICA: Primary | ICD-10-CM

## 2022-08-30 PROCEDURE — 97140 MANUAL THERAPY 1/> REGIONS: CPT

## 2022-08-30 PROCEDURE — 97110 THERAPEUTIC EXERCISES: CPT

## 2022-08-30 PROCEDURE — 97112 NEUROMUSCULAR REEDUCATION: CPT

## 2022-08-30 NOTE — PROGRESS NOTES
Daily Note     Today's date: 2022  Patient name: Malcolm Henderson  : 1950  MRN: [id number]  Referring provider: Lamberto Cuenca DO  Dx:   Encounter Diagnosis     ICD-10-CM    1  Chronic midline low back pain with right-sided sciatica  M54 41     G89 29                   Subjective: patient stated that she's feeling ok this AM, her back is not feeling too bad  Patient reports improved strength and stamina since starting physical therapy  Objective: See treatment diary below      Assessment: Tolerated treatment well  Patient is appropriately challenged with current set of interventions  Favorable response to L/S extension  Moderate fatigue noted with core stability tasks  Patient continues to benefit from [facility] to address soft tissue restrictions in L/S, R>L  Continued PT would be beneficial to improve function           Plan: Continue per plan of care  Precautions: recent hospitalization; blood clotting; Hx of pulmonary embolus;  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     Manuals    MFR to lumbar paraspinals and QL Prone 15' Prone 15' Prone 15'    seated d/t ribs 15' Prone 15'                                    Neuro Re-Ed          Core Brace 10" hd 10x 10x 10x  8x 10x   TA+ March 20x 20x 20x  20x 20x   TA + SLR     held    TA set +bridge  20x 20x 20x  15x 20x   Isometric abdominal curl in supine  20x PB 20x PB 20x PB  held 15x PB    Wall Leans (L side lateral shift correction)                                Ther Ex          TM 7 min   1 5 mph 7 min  1 5 mph 7 min  1 5 mph  7 min   1 4 mph 7 min   1 4 mph 7 min   1 5 mph   LTR 3" hd 20x 20x 20x  20x 20x   Piriformis stretch 30" hold      3x bilat   Hip add sqz 10" hd 5"x20 5" x 20 5" x 20  12x 5"x20   Supine Sciatic N  Glide R only L3 2x20 L3 2x20 L3 2x20  L3 20x L3 20x2   Stand L/S ext 10" hd 10x 10x 10x  10x 10x    Stand hip 3 way 20x 20x ea 20x ea  20x 0# 20x                                    Ther Activity                                  Gait Training                                      Modalities

## 2022-09-01 ENCOUNTER — EVALUATION (OUTPATIENT)
Dept: PHYSICAL THERAPY | Facility: CLINIC | Age: 72
End: 2022-09-01
Payer: MEDICARE

## 2022-09-01 DIAGNOSIS — M54.41 CHRONIC MIDLINE LOW BACK PAIN WITH RIGHT-SIDED SCIATICA: Primary | ICD-10-CM

## 2022-09-01 DIAGNOSIS — G89.29 CHRONIC MIDLINE LOW BACK PAIN WITH RIGHT-SIDED SCIATICA: Primary | ICD-10-CM

## 2022-09-01 PROCEDURE — 97140 MANUAL THERAPY 1/> REGIONS: CPT | Performed by: PHYSICAL THERAPIST

## 2022-09-01 PROCEDURE — 97110 THERAPEUTIC EXERCISES: CPT | Performed by: PHYSICAL THERAPIST

## 2022-09-01 NOTE — PROGRESS NOTES
PT Re-Evaluation     Today's date: 2022  Patient name: Yan Griffin  : 1950  MRN: [id number]  Referring provider: Taylor Goodwin DO  Dx:   Encounter Diagnosis     ICD-10-CM    1  Chronic midline low back pain with right-sided sciatica  M54 41     G89 29                   Assessment  Assessment details: Patient is a 81 y o  female who reports to outpatient physical therapy with referral for LBP  Patient demonstrates improved strength and ROM  She reports improved function  She is ready to be discharged to an [facility]   Impairments: abnormal or restricted ROM, activity intolerance, impaired physical strength, lacks appropriate home exercise program, pain with function and poor posture     Goals  STG (4 weeks)  1  Patient will have reported <3/10 pain in R LE/low back at rest   - MET  2  Improve patient's R passive SLR to >45 degrees for increased ability to take proper strides during ambulation  - Not MET  3  Increase patient's R single leg balance to >10 seconds for increased stability on stairs  - MET  4  Patient will increase walking tolerance to 20 min  - MET    LTG (8 weeks)  1  Patient's LE strength will be equal bilaterally for ability to ambulate and return to functional activities at PLOF  2  Patient will be able to complete normal daily activity with 0/10 pain in lower back  - Partially MET  3  Patient will be independent with home exercise program for continued maintenance post PT discharge  - Progressing  4  Patient will increase walking tolerance to >30 min  - MET      Plan  Plan details: Discharge to [facility]  Planned therapy interventions: home exercise program  Plan of Care beginning date: 2022  Plan of Care expiration date: 2022  Treatment plan discussed with: patient        Subjective Evaluation    History of Present Illness  Mechanism of injury: Estefani Sommer presents to outpatient physical therapy with a referral for low back pain   Estefani Sommer reports pain started in November and ended up finding out she had 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   Patient drove to [place] in April which set off back pain, had pain radiating into R LE      HPI/Primary Complaint: midline to R sided LBP; R LE pain is on and off; thinks her arthritis causes stiffness; has not been doing much walking recently; R LE pain stopped at knee, nothing into calf; gets out of breathe quickly with not having walked much recently; denies NT into LE  Walking tolerance= 10min   Relevant PMH: hospitalization d/t pulmonary embolus   Work/School: retired  Aggravating factors: standing and doing dishes; walking far; lays on side at night  Activity Limitations: walking longer distances  Patient Goals: Improve energy and endurance    Pain  Current pain ratin  At best pain ratin  At worst pain rating: 3  Quality: tight  Aggravating factors: sitting  Progression: improved    Social Support  Steps to enter house: yes (1 ALYSSA)  Stairs in house: no   L[facility] in: [facility]  Lives with: spouse    Treatments  Current treatment: medication and physical therapy  Patient Goals  Patient goals for therapy: decreased pain, increased motion, increased strength, independence with ADLs/IADLs and return to sport/leisure activities          Objective     General Comments:      Lumbar Comments  Sitting Posture: forward lean    Palpation: TTP R lumbar paraspinals/QL    Movement Analysis:   Thoracolumbar AROM:   Flex: 99   Ext: 18  Lateral Flex: R: 15, L: 26  Rotation: R: WFL, L: WFL  Lateral Shift to the Left    Repeated Motions:   Flex: no change  Ext: no change    Functional Assessment:   Core: 3/5    MMTs:  Hip flex (L1-L2): R: 4/5, L: 4-/5  Knee ext (L3-L4): R: 4+/5, L: 4+/5  Knee flex (S2-S3): R: 4+/5, L: 4/5  Sidelying Hip Abd (L4-S1): R: 2+/5, L: 2+/5     Flexibility:   HS:   R:-13   L: -22    Ely's: (-) ,    Clinical Examination Tests:  SEBASTIAN: + R LBP  Neural tension/irritation: SLR test: - B;    Slump test: - B    SLS:  L: 22 31"  R: 22 17"             Precautions: recent hospitalization; blood clotting; Hx of pulmonary embolus;  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     Manuals 8/22 8/25 8/30 9/1 8/16 8/18   MFR to lumbar paraspinals and QL Prone 15' Prone 15' Prone 15'   seated d/t ribs 15' Prone 15'                                                Neuro Re-Ed              Core Brace 10" hd 10x 10x 10x  8x 10x   TA+ March 20x 20x 20x  20x 20x   TA + SLR        held     TA set +bridge  20x 20x 20x  15x 20x   Isometric abdominal curl in supine  20x PB 20x PB 20x PB  held 15x PB    Wall Leans (L side lateral shift correction)                                            Ther Ex              TM 7 min   1 5 mph 7 min  1 5 mph 7 min  1 5 mph  8 min   1 5 mph 7 min   1 4 mph 7 min   1 5 mph   LTR 3" hd 20x 20x 20x   20x 20x   Piriformis stretch 30" hold           3x bilat   Hip add sqz 10" hd 5"x20 5" x 20 5" x 20   12x 5"x20   Supine Sciatic N  Glide R only L3 2x20 L3 2x20 L3 2x20   L3 20x L3 20x2   Stand L/S ext 10" hd 10x 10x 10x   10x 10x    Stand hip 3 way 20x 20x ea 20x ea   20x 0# 20x                                                   Ther Activity                                               Gait Training                                               Modalities

## 2022-09-01 NOTE — LETTER
2022    Lul Pearce DO   [address] [facility]    Patient: Ciaran Maravilla   YOB: 1950   Date of Visit: 2022     Encounter Diagnosis     ICD-10-CM    1  Chronic midline low back pain with right-sided sciatica  M54 41     G89 29        Dear Dr Debora Heller:    Thank you for your recent referral of Ciaran Maravilla  Please review the attached evaluation summary from Rqauel's recent visit  Please verify that you agree with the plan of care by signing the attached order  If you have any questions or concerns, please do not hesitate to call  I sincerely appreciate the opportunity to share in the care of one of your patients and hope to have another opportunity to work with you in the near future  Sincerely,    Shakir Escalante, PT      Referring Provider:      I certify that I have read the below Plan of Care and certify the need for these services furnished under this plan of treatment while under my care  Lul Pearce, [address] [facility] [facility] [address] [facility] [address]  Via Fax: [phone]          PT Re-Evaluation     Today's date: 2022  Patient name: Ciaran Maravilla  : 1950  MRN: [id number]  Referring provider: Chasity Reid DO  Dx:   Encounter Diagnosis     ICD-10-CM    1  Chronic midline low back pain with right-sided sciatica  M54 41     G89 29                   Assessment  Assessment details: Patient is a 81 y o  female who reports to outpatient physical therapy with referral for LBP  Patient demonstrates improved strength and ROM  She reports improved function  She is ready to be discharged to an [facility]   Impairments: abnormal or restricted ROM, activity intolerance, impaired physical strength, lacks appropriate home exercise program, pain with function and poor posture     Goals  STG (4 weeks)  1  Patient will have reported <3/10 pain in R LE/low back at rest   - MET  2   Improve patient's R passive SLR to >45 degrees for increased ability to take proper strides during ambulation  - Not MET  3  Increase patient's R single leg balance to >10 seconds for increased stability on stairs  - MET  4  Patient will increase walking tolerance to 20 min  - MET    LTG (8 weeks)  1  Patient's LE strength will be equal bilaterally for ability to ambulate and return to functional activities at OF  2  Patient will be able to complete normal daily activity with 0/10 pain in lower back  - Partially MET  3  Patient will be independent with home exercise program for continued maintenance post PT discharge  - Progressing  4  Patient will increase walking tolerance to >30 min  - MET      Plan  Plan details: Discharge to [facility]  Planned therapy interventions: home exercise program  Plan of Care beginning date: 2022  Plan of Care expiration date: 2022  Treatment plan discussed with: patient        Subjective Evaluation    History of Present Illness  Mechanism of injury: Stiven Lambert presents to outpatient physical therapy with a referral for low back pain  Stiven Lambert reports pain started in November and ended up finding out she had 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   Patient drove to [place] in April which set off back pain, had pain radiating into R LE      HPI/Primary Complaint: midline to R sided LBP; R LE pain is on and off; thinks her arthritis causes stiffness; has not been doing much walking recently; R LE pain stopped at knee, nothing into calf; gets out of breathe quickly with not having walked much recently; denies NT into LE  Walking tolerance= 10min   Relevant PMH: hospitalization d/t pulmonary embolus   Work/School: retired  Aggravating factors: standing and doing dishes; walking far; lays on side at night  Activity Limitations: walking longer distances  Patient Goals: Improve energy and endurance    Pain  Current pain ratin  At best pain ratin  At worst pain rating: 3  Quality: tight  Aggravating factors: sitting  Progression: improved    Social Support  Steps to enter house: yes (1 ALYSSA)  Stairs in house: no   Lives in: [place] house  Lives with: spouse    Treatments  Current treatment: medication and physical therapy  Patient Goals  Patient goals for therapy: decreased pain, increased motion, increased strength, independence with ADLs/IADLs and return to sport/leisure activities          Objective     General Comments:      Lumbar Comments  Sitting Posture: forward lean    Palpation: TTP R lumbar paraspinals/QL    Movement Analysis:   Thoracolumbar AROM:   Flex: 99   Ext: 18  Lateral Flex: R: 15, L: 26  Rotation: R: WFL, L: WFL  Lateral Shift to the Left    Repeated Motions:   Flex: no change  Ext: no change    Functional Assessment:   Core: 3/5    MMTs:  Hip flex (L1-L2): R: 4/5, L: 4-/5  Knee ext (L3-L4): R: 4+/5, L: 4+/5  Knee flex (S2-S3): R: 4+/5, L: 4/5  Sidelying Hip Abd (L4-S1): R: 2+/5, L: 2+/5     Flexibility:   HS:   R:-13   L: -22    Ely's: (-) ,    Clinical Examination Tests:  SEBASTIAN: + R LBP  Neural tension/irritation: SLR test: - B;    Slump test: - B    SLS:  L: 22 31"  R: 22 17"             Precautions: recent hospitalization; blood clotting; Hx of pulmonary embolus;  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     Manuals 8/22 8/25 8/30 9/1 8/16 8/18   MFR to lumbar paraspinals and QL Prone 15' Prone 15' Prone 15'   seated d/t ribs 15' Prone 15'                                                Neuro Re-Ed              Core Brace 10" hd 10x 10x 10x  8x 10x   TA+ March 20x 20x 20x  20x 20x   TA + SLR        held     TA set +bridge  20x 20x 20x  15x 20x   Isometric abdominal curl in supine  20x PB 20x PB 20x PB  held 15x PB    Wall Leans (L side lateral shift correction)                                            Ther Ex              TM 7 min   1 5 mph 7 min  1 5 mph 7 min  1 5 mph  8 min   1 5 mph 7 min   1 4 mph 7 min   1 5 mph   LTR 3" hd 20x 20x 20x   20x 20x   Piriformis stretch 30" hold           3x bilat   Hip add sqz 10" hd 5"x20 5" x 20 5" x 20   12x 5"x20   Supine Sciatic N  Glide R only L3 2x20 L3 2x20 L3 2x20   L3 20x L3 20x2   Stand L/S ext 10" hd 10x 10x 10x   10x 10x    Stand hip 3 way 20x 20x ea 20x ea   20x 0# 20x                                                   Ther Activity                                               Gait Training                                               Modalities

## 2022-09-06 ENCOUNTER — APPOINTMENT (OUTPATIENT)
Dept: PHYSICAL THERAPY | Facility: CLINIC | Age: 72
End: 2022-09-06
Payer: MEDICARE

## 2022-09-08 ENCOUNTER — APPOINTMENT (OUTPATIENT)
Dept: PHYSICAL THERAPY | Facility: CLINIC | Age: 72
End: 2022-09-08
Payer: MEDICARE

## 2022-09-21 ENCOUNTER — APPOINTMENT (OUTPATIENT)
Dept: LAB | Facility: CLINIC | Age: 72
End: 2022-09-21
Payer: MEDICARE

## 2022-09-21 DIAGNOSIS — M06.4 INFLAMMATORY POLYARTHROPATHY (HCC): ICD-10-CM

## 2022-09-21 LAB
ALBUMIN SERPL BCP-MCNC: 3.1 G/DL (ref 3.5–5)
ALP SERPL-CCNC: 82 U/L (ref 46–116)
ALT SERPL W P-5'-P-CCNC: 26 U/L (ref 12–78)
ANION GAP SERPL CALCULATED.3IONS-SCNC: 4 MMOL/L (ref 4–13)
AST SERPL W P-5'-P-CCNC: 15 U/L (ref 5–45)
BASOPHILS # BLD AUTO: 0.02 THOUSANDS/ΜL (ref 0–0.1)
BASOPHILS NFR BLD AUTO: 0 % (ref 0–1)
BILIRUB SERPL-MCNC: 0.44 MG/DL (ref 0.2–1)
BUN SERPL-MCNC: 20 MG/DL (ref 5–25)
CALCIUM ALBUM COR SERPL-MCNC: 10.1 MG/DL (ref 8.3–10.1)
CALCIUM SERPL-MCNC: 9.4 MG/DL (ref 8.3–10.1)
CHLORIDE SERPL-SCNC: 110 MMOL/L (ref 96–108)
CO2 SERPL-SCNC: 26 MMOL/L (ref 21–32)
CREAT SERPL-MCNC: 0.75 MG/DL (ref 0.6–1.3)
CRP SERPL QL: 6 MG/L
EOSINOPHIL # BLD AUTO: 0.03 THOUSAND/ΜL (ref 0–0.61)
EOSINOPHIL NFR BLD AUTO: 0 % (ref 0–6)
ERYTHROCYTE [DISTWIDTH] IN BLOOD BY AUTOMATED COUNT: 13.5 % (ref 11.6–15.1)
ERYTHROCYTE [SEDIMENTATION RATE] IN BLOOD: 47 MM/HOUR (ref 0–29)
GFR SERPL CREATININE-BSD FRML MDRD: 81 ML/MIN/1.73SQ M
GLUCOSE P FAST SERPL-MCNC: 79 MG/DL (ref 65–99)
HCT VFR BLD AUTO: 41 % (ref 34.8–46.1)
HGB BLD-MCNC: 13 G/DL (ref 11.5–15.4)
IMM GRANULOCYTES # BLD AUTO: 0.03 THOUSAND/UL (ref 0–0.2)
IMM GRANULOCYTES NFR BLD AUTO: 0 % (ref 0–2)
LYMPHOCYTES # BLD AUTO: 2 THOUSANDS/ΜL (ref 0.6–4.47)
LYMPHOCYTES NFR BLD AUTO: 22 % (ref 14–44)
MCH RBC QN AUTO: 31.8 PG (ref 26.8–34.3)
MCHC RBC AUTO-ENTMCNC: 31.7 G/DL (ref 31.4–37.4)
MCV RBC AUTO: 100 FL (ref 82–98)
MONOCYTES # BLD AUTO: 0.68 THOUSAND/ΜL (ref 0.17–1.22)
MONOCYTES NFR BLD AUTO: 7 % (ref 4–12)
NEUTROPHILS # BLD AUTO: 6.48 THOUSANDS/ΜL (ref 1.85–7.62)
NEUTS SEG NFR BLD AUTO: 71 % (ref 43–75)
NRBC BLD AUTO-RTO: 0 /100 WBCS
PLATELET # BLD AUTO: 250 THOUSANDS/UL (ref 149–390)
PMV BLD AUTO: 11.7 FL (ref 8.9–12.7)
POTASSIUM SERPL-SCNC: 3.7 MMOL/L (ref 3.5–5.3)
PROT SERPL-MCNC: 7 G/DL (ref 6.4–8.4)
RBC # BLD AUTO: 4.09 MILLION/UL (ref 3.81–5.12)
SODIUM SERPL-SCNC: 140 MMOL/L (ref 135–147)
WBC # BLD AUTO: 9.24 THOUSAND/UL (ref 4.31–10.16)

## 2022-09-21 PROCEDURE — 36415 COLL VENOUS BLD VENIPUNCTURE: CPT

## 2022-09-21 PROCEDURE — 85025 COMPLETE CBC W/AUTO DIFF WBC: CPT

## 2022-09-21 PROCEDURE — 85652 RBC SED RATE AUTOMATED: CPT

## 2022-09-21 PROCEDURE — 80053 COMPREHEN METABOLIC PANEL: CPT

## 2022-09-21 PROCEDURE — 86140 C-REACTIVE PROTEIN: CPT

## 2022-11-08 ENCOUNTER — HOSPITAL ENCOUNTER (OUTPATIENT)
Dept: RADIOLOGY | Facility: HOSPITAL | Age: 72
Discharge: HOME | End: 2022-11-08
Attending: FAMILY MEDICINE

## 2022-11-08 DIAGNOSIS — R07.9 CHEST PAIN, UNSPECIFIED TYPE: ICD-10-CM

## 2022-12-19 ENCOUNTER — APPOINTMENT (OUTPATIENT)
Dept: LAB | Facility: CLINIC | Age: 72
End: 2022-12-19

## 2022-12-19 DIAGNOSIS — Z79.899 ENCOUNTER FOR LONG-TERM (CURRENT) USE OF OTHER MEDICATIONS: ICD-10-CM

## 2022-12-19 DIAGNOSIS — M06.4 INFLAMMATORY POLYARTHROPATHY (HCC): ICD-10-CM

## 2022-12-19 LAB
ALBUMIN SERPL BCP-MCNC: 3.3 G/DL (ref 3.5–5)
ALP SERPL-CCNC: 87 U/L (ref 46–116)
ALT SERPL W P-5'-P-CCNC: 32 U/L (ref 12–78)
ANION GAP SERPL CALCULATED.3IONS-SCNC: 6 MMOL/L (ref 4–13)
AST SERPL W P-5'-P-CCNC: 17 U/L (ref 5–45)
BASOPHILS # BLD AUTO: 0.04 THOUSANDS/ÂΜL (ref 0–0.1)
BASOPHILS NFR BLD AUTO: 1 % (ref 0–1)
BILIRUB SERPL-MCNC: 0.65 MG/DL (ref 0.2–1)
BUN SERPL-MCNC: 20 MG/DL (ref 5–25)
CALCIUM ALBUM COR SERPL-MCNC: 9.9 MG/DL (ref 8.3–10.1)
CALCIUM SERPL-MCNC: 9.3 MG/DL (ref 8.3–10.1)
CHLORIDE SERPL-SCNC: 108 MMOL/L (ref 96–108)
CO2 SERPL-SCNC: 26 MMOL/L (ref 21–32)
CREAT SERPL-MCNC: 0.8 MG/DL (ref 0.6–1.3)
CRP SERPL QL: 5.4 MG/L
EOSINOPHIL # BLD AUTO: 0.09 THOUSAND/ÂΜL (ref 0–0.61)
EOSINOPHIL NFR BLD AUTO: 1 % (ref 0–6)
ERYTHROCYTE [DISTWIDTH] IN BLOOD BY AUTOMATED COUNT: 12.7 % (ref 11.6–15.1)
ERYTHROCYTE [SEDIMENTATION RATE] IN BLOOD: 46 MM/HOUR (ref 0–29)
GFR SERPL CREATININE-BSD FRML MDRD: 74 ML/MIN/1.73SQ M
GLUCOSE P FAST SERPL-MCNC: 84 MG/DL (ref 65–99)
HCT VFR BLD AUTO: 41 % (ref 34.8–46.1)
HGB BLD-MCNC: 13.1 G/DL (ref 11.5–15.4)
IMM GRANULOCYTES # BLD AUTO: 0.02 THOUSAND/UL (ref 0–0.2)
IMM GRANULOCYTES NFR BLD AUTO: 0 % (ref 0–2)
LYMPHOCYTES # BLD AUTO: 2.75 THOUSANDS/ÂΜL (ref 0.6–4.47)
LYMPHOCYTES NFR BLD AUTO: 36 % (ref 14–44)
MCH RBC QN AUTO: 31.3 PG (ref 26.8–34.3)
MCHC RBC AUTO-ENTMCNC: 32 G/DL (ref 31.4–37.4)
MCV RBC AUTO: 98 FL (ref 82–98)
MONOCYTES # BLD AUTO: 0.61 THOUSAND/ÂΜL (ref 0.17–1.22)
MONOCYTES NFR BLD AUTO: 8 % (ref 4–12)
NEUTROPHILS # BLD AUTO: 4.04 THOUSANDS/ÂΜL (ref 1.85–7.62)
NEUTS SEG NFR BLD AUTO: 54 % (ref 43–75)
NRBC BLD AUTO-RTO: 0 /100 WBCS
PLATELET # BLD AUTO: 289 THOUSANDS/UL (ref 149–390)
PMV BLD AUTO: 11.4 FL (ref 8.9–12.7)
POTASSIUM SERPL-SCNC: 3.7 MMOL/L (ref 3.5–5.3)
PROT SERPL-MCNC: 7 G/DL (ref 6.4–8.4)
RBC # BLD AUTO: 4.18 MILLION/UL (ref 3.81–5.12)
SODIUM SERPL-SCNC: 140 MMOL/L (ref 135–147)
WBC # BLD AUTO: 7.55 THOUSAND/UL (ref 4.31–10.16)

## 2023-07-11 ENCOUNTER — APPOINTMENT (OUTPATIENT)
Dept: LAB | Facility: CLINIC | Age: 73
End: 2023-07-11
Payer: MEDICARE

## 2023-07-11 DIAGNOSIS — M06.4 INFLAMMATORY POLYARTHROPATHY (HCC): ICD-10-CM

## 2023-07-11 DIAGNOSIS — Z79.899 ENCOUNTER FOR LONG-TERM (CURRENT) USE OF OTHER MEDICATIONS: ICD-10-CM

## 2023-07-11 LAB
ALBUMIN SERPL BCP-MCNC: 3.4 G/DL (ref 3.5–5)
ALP SERPL-CCNC: 75 U/L (ref 46–116)
ALT SERPL W P-5'-P-CCNC: 26 U/L (ref 12–78)
ANION GAP SERPL CALCULATED.3IONS-SCNC: 1 MMOL/L
AST SERPL W P-5'-P-CCNC: 19 U/L (ref 5–45)
BASOPHILS # BLD AUTO: 0.02 THOUSANDS/ÂΜL (ref 0–0.1)
BASOPHILS NFR BLD AUTO: 0 % (ref 0–1)
BILIRUB SERPL-MCNC: 0.94 MG/DL (ref 0.2–1)
BUN SERPL-MCNC: 23 MG/DL (ref 5–25)
CALCIUM ALBUM COR SERPL-MCNC: 9.8 MG/DL (ref 8.3–10.1)
CALCIUM SERPL-MCNC: 9.3 MG/DL (ref 8.3–10.1)
CHLORIDE SERPL-SCNC: 113 MMOL/L (ref 96–108)
CO2 SERPL-SCNC: 26 MMOL/L (ref 21–32)
CREAT SERPL-MCNC: 0.8 MG/DL (ref 0.6–1.3)
CRP SERPL QL: <3 MG/L
EOSINOPHIL # BLD AUTO: 0.02 THOUSAND/ÂΜL (ref 0–0.61)
EOSINOPHIL NFR BLD AUTO: 0 % (ref 0–6)
ERYTHROCYTE [DISTWIDTH] IN BLOOD BY AUTOMATED COUNT: 13.6 % (ref 11.6–15.1)
ERYTHROCYTE [SEDIMENTATION RATE] IN BLOOD: 30 MM/HOUR (ref 0–29)
GFR SERPL CREATININE-BSD FRML MDRD: 74 ML/MIN/1.73SQ M
GLUCOSE P FAST SERPL-MCNC: 111 MG/DL (ref 65–99)
HCT VFR BLD AUTO: 41.7 % (ref 34.8–46.1)
HGB BLD-MCNC: 13.4 G/DL (ref 11.5–15.4)
IMM GRANULOCYTES # BLD AUTO: 0.02 THOUSAND/UL (ref 0–0.2)
IMM GRANULOCYTES NFR BLD AUTO: 0 % (ref 0–2)
LYMPHOCYTES # BLD AUTO: 0.68 THOUSANDS/ÂΜL (ref 0.6–4.47)
LYMPHOCYTES NFR BLD AUTO: 13 % (ref 14–44)
MCH RBC QN AUTO: 32.2 PG (ref 26.8–34.3)
MCHC RBC AUTO-ENTMCNC: 32.1 G/DL (ref 31.4–37.4)
MCV RBC AUTO: 100 FL (ref 82–98)
MONOCYTES # BLD AUTO: 0.44 THOUSAND/ÂΜL (ref 0.17–1.22)
MONOCYTES NFR BLD AUTO: 8 % (ref 4–12)
NEUTROPHILS # BLD AUTO: 4.12 THOUSANDS/ÂΜL (ref 1.85–7.62)
NEUTS SEG NFR BLD AUTO: 79 % (ref 43–75)
NRBC BLD AUTO-RTO: 0 /100 WBCS
PLATELET # BLD AUTO: 226 THOUSANDS/UL (ref 149–390)
PMV BLD AUTO: 11.8 FL (ref 8.9–12.7)
POTASSIUM SERPL-SCNC: 4.2 MMOL/L (ref 3.5–5.3)
PROT SERPL-MCNC: 6.8 G/DL (ref 6.4–8.4)
RBC # BLD AUTO: 4.16 MILLION/UL (ref 3.81–5.12)
SODIUM SERPL-SCNC: 140 MMOL/L (ref 135–147)
WBC # BLD AUTO: 5.3 THOUSAND/UL (ref 4.31–10.16)

## 2023-07-11 PROCEDURE — 85652 RBC SED RATE AUTOMATED: CPT

## 2023-07-11 PROCEDURE — 86140 C-REACTIVE PROTEIN: CPT

## 2023-07-11 PROCEDURE — 36415 COLL VENOUS BLD VENIPUNCTURE: CPT

## 2023-07-11 PROCEDURE — 85025 COMPLETE CBC W/AUTO DIFF WBC: CPT

## 2023-07-11 PROCEDURE — 80053 COMPREHEN METABOLIC PANEL: CPT

## 2023-08-23 ENCOUNTER — HOSPITAL ENCOUNTER (OUTPATIENT)
Dept: NON INVASIVE DIAGNOSTICS | Facility: HOSPITAL | Age: 73
Discharge: HOME | End: 2023-08-23
Attending: FAMILY MEDICINE
Payer: MEDICARE

## 2023-08-23 ENCOUNTER — HOSPITAL ENCOUNTER (OUTPATIENT)
Dept: RADIOLOGY | Facility: HOSPITAL | Age: 73
Discharge: HOME | End: 2023-08-23
Payer: MEDICARE

## 2023-08-23 DIAGNOSIS — R60.9 EDEMA, UNSPECIFIED TYPE: ICD-10-CM

## 2023-08-23 DIAGNOSIS — M79.605 LEFT LEG PAIN: ICD-10-CM

## 2023-08-23 PROCEDURE — 93971 EXTREMITY STUDY: CPT

## 2023-08-23 PROCEDURE — 73562 X-RAY EXAM OF KNEE 3: CPT

## 2023-08-24 ENCOUNTER — APPOINTMENT (OUTPATIENT)
Dept: LAB | Facility: CLINIC | Age: 73
End: 2023-08-24
Payer: MEDICARE

## 2023-08-24 DIAGNOSIS — M06.4 INFLAMMATORY POLYARTHROPATHY (HCC): ICD-10-CM

## 2023-08-24 LAB
CRP SERPL QL: 2.7 MG/L
ERYTHROCYTE [SEDIMENTATION RATE] IN BLOOD: 31 MM/HOUR (ref 0–29)

## 2023-08-24 PROCEDURE — 85652 RBC SED RATE AUTOMATED: CPT

## 2023-08-24 PROCEDURE — 36415 COLL VENOUS BLD VENIPUNCTURE: CPT

## 2023-08-24 PROCEDURE — 93971 EXTREMITY STUDY: CPT | Performed by: SURGERY

## 2023-08-24 PROCEDURE — 86140 C-REACTIVE PROTEIN: CPT

## 2023-09-22 ENCOUNTER — OFFICE VISIT (OUTPATIENT)
Dept: OBGYN CLINIC | Facility: CLINIC | Age: 73
End: 2023-09-22
Payer: MEDICARE

## 2023-09-22 VITALS
WEIGHT: 218 LBS | BODY MASS INDEX: 37.22 KG/M2 | HEIGHT: 64 IN | SYSTOLIC BLOOD PRESSURE: 156 MMHG | DIASTOLIC BLOOD PRESSURE: 84 MMHG | RESPIRATION RATE: 16 BRPM | HEART RATE: 102 BPM

## 2023-09-22 DIAGNOSIS — M06.9 RHEUMATOID ARTHRITIS OF OTHER SITE, UNSPECIFIED WHETHER RHEUMATOID FACTOR PRESENT (HCC): ICD-10-CM

## 2023-09-22 DIAGNOSIS — M17.12 PRIMARY OSTEOARTHRITIS OF LEFT KNEE: Primary | ICD-10-CM

## 2023-09-22 PROCEDURE — 20610 DRAIN/INJ JOINT/BURSA W/O US: CPT

## 2023-09-22 PROCEDURE — 99204 OFFICE O/P NEW MOD 45 MIN: CPT

## 2023-09-22 RX ORDER — FOLIC ACID 1 MG/1
TABLET ORAL DAILY
COMMUNITY

## 2023-09-22 RX ORDER — ETANERCEPT 50 MG/ML
SOLUTION SUBCUTANEOUS
COMMUNITY
Start: 2022-03-01

## 2023-09-22 RX ORDER — CHOLECALCIFEROL (VITAMIN D3) 125 MCG
TABLET ORAL
COMMUNITY
Start: 2023-06-30

## 2023-09-22 RX ORDER — ERGOCALCIFEROL 1.25 MG/1
50000 CAPSULE ORAL WEEKLY
COMMUNITY
Start: 2023-06-30

## 2023-09-22 RX ORDER — RIVAROXABAN 10 MG/1
10 TABLET, FILM COATED ORAL DAILY
COMMUNITY
Start: 2023-07-28

## 2023-09-22 RX ORDER — PREDNISONE 2.5 MG/1
TABLET ORAL
COMMUNITY
Start: 2022-03-01

## 2023-09-22 RX ADMIN — ROPIVACAINE HYDROCHLORIDE 10 ML: 5 INJECTION, SOLUTION EPIDURAL; INFILTRATION; PERINEURAL at 09:00

## 2023-09-22 RX ADMIN — TRIAMCINOLONE ACETONIDE 40 MG: 40 INJECTION, SUSPENSION INTRA-ARTICULAR; INTRAMUSCULAR at 09:00

## 2023-09-22 NOTE — PROGRESS NOTES
Orthopedic Surgery Office Note  Gwyn De Santiago (5 y.o. female)   : 1950   MRN: [id number]   Encounter Date: 2023  Dr. Abigail Hartley DO, Orthopedic Surgeon  Orthopedic Oncology & Sar[facility] Surgery   Chief Complaint   Patient presents with   • Left Knee - Pain     BAKER'S CYST       Assessment / Annamaria Slater was seen today for pain. Diagnoses and all orders for this visit:    Primary osteoarthritis of left knee  -     Ambulatory Referral to Physical Therapy; Future  -     Large joint arthrocentesis: L knee    Rheumatoid arthritis of other site, unspecified whether rheumatoid factor present (HCC)        Discussion:   Osteoarthritis of left knee  -Discussed with patient that arthritis is a chronic, incurable, irreversible disease and that management would be focused on alleviating symptoms, as it is not possible to reverse or remove the degenerative changes. Discussed treatment options to include symptom masking with voltaren gel and OTC pain relievers, muscle strengthening with PT to have the joint rely on strong muscle rather than bad bone, and possible consideration of corticosteroid or viscosupplementation injections in the future. Discussed definitive treatment as total joint replacement, which would be an elective, pain-relieving procedure, and that symptoms and radiographs may not align, leaving it to the patient to determine when the symptoms would warrant the surgery.   - PT script Ordered ; home exercises provided  - The risks and benefits of the injection (which include but are not limited to: infection, bleeding, damage to nerve/artery, need for further intervention), as well as the risks and benefits of all alternative treatments were explained and understood. The patient elected to proceed with injection. The procedure was done with aseptic technique, and the patient tolerated the procedure well with no complications.   A corticosteroid injection of left knee was performed in the office today.  Ice to the injection site 1-2 times daily for 20 minutes, for next 24-48 hrs. May consider future corticosteroid injection depending on clinical exam/imaging. • - Follow up: in 3 month(s) or PRN right knee symptoms  • Discussed compression socks that could assist in swelling of the legs    Surgery:   • No surgery planned at this time    Plan:   Return in about 3 months (around 12/22/2023). History of Present Illness:  Ag Brown is a 72 y.o. female who presents for pain in the left knee. She has had knee pain for some time, worse with stairs and having aching pain throughout the knee. She was seen by her family med provider who ordered left knee XR and told her that she had a bakers cyst associated with the fullness and pain to the posterior knee. She also has pain on the medial aspect of the knee. She has seen some improvement with ice and rest as needed. Denies any radiating pain to the calf/foot. Has had prior foot injections on that time. Some swelling to the left leg. No prior surgery or injury to the left knee. Review of Systems  Constitutional: Negative for fatigue, fever or loss of appetite. HENT: Negative. Respiratory: Negative for shortness of breath, dyspnea. Cardiovascular: Negative for chest pain/tightness. Gastrointestinal: Negative for abdominal pain, N/V. Endocrine: Negative for cold/heat intolerance, unexplained weight loss/gain. Genitourinary: Negative for flank pain, dysuria  Musculoskeletal:  Positive for arthralgia   Skin: Negative for rash. Neurological:  Negative for numbness or tingling  Psychiatric/Behavioral: Negative for agitation. Physical Exam  /84   Pulse 102   Resp 16   Ht 5' 4" (1.626 m)   Wt 98.9 kg (218 lb)   BMI 37.42 kg/m²   Cons: Appears well. No apparent distress. Psych: Alert. Oriented x3. Mood and affect normal.  Eyes: PERRLA, EOMI  Resp: Normal effort. No audible wheezing or stridor.   CV: Extremities warm and well perfused. Abd:    No distention or guarding. Skin: Warm. No visible lesions. Neuro: Normal muscle tone. Orthopedic Exam:     left knee(s) -   Patient ambulates with steady gait pattern  Uses No assistive device  No anatomical deformity  Skin is warm and dry to touch with no signs of erythema, ecchymosis, or infection   Mild generalized soft tissue swelling or effusion noted  ROM 0-120  MMT: 5/5 throughout  TTP over medial joint line, TTP over lateral joint line, popliteal fullness appreciated on exam   Flexor and extensor mechanisms are intact   Patella tracks centrally with palpable crepitus  Calf compartments are soft and supple  intact pulses with brisk capillary refill to the toes  Sensation light touch intact distally      Studies Reviewed  Study type: XRAY left knee  Date: 9/22/23  I have personally reviewed all relevant imaging. I have read and agree with the radiologist report. My impression is as follows: There is moderate medial and patellofemoral compartment joint space narrowing. There are small marginal osteophytes at the medial, lateral and patellofemoral compartments. Large joint arthrocentesis: L knee  Universal Protocol:  Consent: Verbal consent obtained. Risks and benefits: risks, benefits and alternatives were discussed  Consent given by: patient  Time out: Immediately prior to procedure a "time out" was called to verify the correct patient, procedure, equipment, support staff and site/side marked as required.   Patient understanding: patient states understanding of the procedure being performed  Site marked: the operative site was marked  Patient identity confirmed: verbally with patient    Supporting Documentation  Indications: pain   Procedure Details  Location: knee - L knee  Preparation: Patient was prepped and draped in the usual sterile fashion  Needle size: 20 G  Ultrasound guidance: no  Approach: anterolateral  Medications administered: 10 mL ropivacaine 0.5 %; 40 mg triamcinolone acetonide 40 mg/mL    Patient tolerance: patient tolerated the procedure well with no immediate complications  Dressing:  Sterile dressing applied           Medical, Surgical, Family, and Social History  The patient's medical history, family history, and social history, were reviewed and updated as appropriate. Past Medical History:   Diagnosis Date   • Encephalitis    • History of blood clot in brain      Past Surgical History:   Procedure Laterality Date   • CHOLECYSTECTOMY     • CYST REMOVAL Right     wrist   • HEMORROIDECTOMY       History reviewed. No pertinent family history. Social History     Occupational History   • Not on file   Tobacco Use   • Smoking status: Former   • Smokeless tobacco: Never   Vaping Use   • Vaping Use: Never used   Substance and Sexual Activity   • Alcohol use: Not Currently   • Drug use: Not Currently   • Sexual activity: Not Currently     Allergies   Allergen Reactions   • Percocet [Oxycodone-Acetaminophen] GI Intolerance   • Percocet [Oxycodone-Acetaminophen] GI Intolerance   • Sulfa Antibiotics      Uncertain allergy       Current Outpatient Medications:   •  acetaminophen (TYLENOL) 650 mg CR tablet, Take 1 tablet (650 mg total) by mouth every 8 (eight) hours as needed for mild pain, Disp: 30 tablet, Rfl: 0  •  aspirin (ECOTRIN) 325 mg EC tablet, Take 325 mg by mouth daily. , Disp: , Rfl:   •  Ergocalciferol (Vitamin D2) 50 MCG (2000 UT) TABS, , Disp: , Rfl:   •  ergocalciferol (VITAMIN D2) 50,000 units, Take 50,000 Units by mouth once a week, Disp: , Rfl:   •  Etanercept (Enbrel Mini) 50 MG/ML SOCT, , Disp: , Rfl:   •  folic acid (FOLVITE) 1 mg tablet, Take by mouth daily, Disp: , Rfl:   •  lidocaine (Lidoderm) 5 %, Apply 1 patch topically daily Remove & Discard patch within 12 hours or as directed by MD, Disp: 6 patch, Rfl: 0  •  LUMIGAN 0.01 % ophthalmic drops, , Disp: , Rfl:   •  methocarbamol (ROBAXIN) 500 mg tablet, Take 1 tablet (500 mg total) by mouth 3 (three) times a day as needed for muscle spasms, Disp: 30 tablet, Rfl: 0  •  methotrexate 2.5 mg tablet, , Disp: , Rfl:   •  predniSONE 2.5 mg tablet, , Disp: , Rfl:   •  rivaroxaban (Xarelto Starter Pack) 15 & 20 MG starter pack, Take 1 Package by mouth daily, Disp: 1 each, Rfl: 0  •  Xarelto 10 MG tablet, Take 10 mg by mouth daily, Disp: , Rfl:     30 minutes was spent in the coordination of care, reviewing of imaging and with the patient on the date of service    LEROY Garcia Georgia Mister PA-C, scribed this note in conjunction with and in the presence of Dr. Guevara Marcano, who performed parts of the services as described in this documentation

## 2023-09-24 RX ORDER — TRIAMCINOLONE ACETONIDE 40 MG/ML
40 INJECTION, SUSPENSION INTRA-ARTICULAR; INTRAMUSCULAR
Status: COMPLETED | OUTPATIENT
Start: 2023-09-22 | End: 2023-09-22

## 2023-09-24 RX ORDER — ROPIVACAINE HYDROCHLORIDE 5 MG/ML
10 INJECTION, SOLUTION EPIDURAL; INFILTRATION; PERINEURAL
Status: COMPLETED | OUTPATIENT
Start: 2023-09-22 | End: 2023-09-22

## 2023-10-31 ENCOUNTER — HOSPITAL ENCOUNTER (EMERGENCY)
Facility: HOSPITAL | Age: 73
Discharge: HOME | End: 2023-10-31
Attending: EMERGENCY MEDICINE | Admitting: EMERGENCY MEDICINE
Payer: MEDICARE

## 2023-10-31 VITALS
RESPIRATION RATE: 16 BRPM | OXYGEN SATURATION: 96 % | DIASTOLIC BLOOD PRESSURE: 84 MMHG | HEART RATE: 102 BPM | TEMPERATURE: 97.7 F | SYSTOLIC BLOOD PRESSURE: 156 MMHG

## 2023-10-31 DIAGNOSIS — L03.115 CELLULITIS OF RIGHT LEG: Primary | ICD-10-CM

## 2023-10-31 LAB
ANION GAP SERPL CALCULATED.3IONS-SCNC: 9 MMOL/L
B BURGDOR IGG+IGM SER QL IA: NEGATIVE
BASOPHILS # BLD AUTO: 0.02 THOUSANDS/ÂΜL (ref 0–0.1)
BASOPHILS NFR BLD AUTO: 0 % (ref 0–1)
BUN SERPL-MCNC: 16 MG/DL (ref 5–25)
CALCIUM SERPL-MCNC: 10 MG/DL (ref 8.4–10.2)
CHLORIDE SERPL-SCNC: 106 MMOL/L (ref 96–108)
CO2 SERPL-SCNC: 25 MMOL/L (ref 21–32)
CREAT SERPL-MCNC: 0.79 MG/DL (ref 0.6–1.3)
EOSINOPHIL # BLD AUTO: 0.02 THOUSAND/ÂΜL (ref 0–0.61)
EOSINOPHIL NFR BLD AUTO: 0 % (ref 0–6)
ERYTHROCYTE [DISTWIDTH] IN BLOOD BY AUTOMATED COUNT: 13.2 % (ref 11.6–15.1)
GFR SERPL CREATININE-BSD FRML MDRD: 75 ML/MIN/1.73SQ M
GLUCOSE SERPL-MCNC: 113 MG/DL (ref 65–140)
HCT VFR BLD AUTO: 41.1 % (ref 34.8–46.1)
HGB BLD-MCNC: 13.5 G/DL (ref 11.5–15.4)
IMM GRANULOCYTES # BLD AUTO: 0.03 THOUSAND/UL (ref 0–0.2)
IMM GRANULOCYTES NFR BLD AUTO: 0 % (ref 0–2)
LYMPHOCYTES # BLD AUTO: 1.21 THOUSANDS/ÂΜL (ref 0.6–4.47)
LYMPHOCYTES NFR BLD AUTO: 14 % (ref 14–44)
MCH RBC QN AUTO: 32.8 PG (ref 26.8–34.3)
MCHC RBC AUTO-ENTMCNC: 32.8 G/DL (ref 31.4–37.4)
MCV RBC AUTO: 100 FL (ref 82–98)
MONOCYTES # BLD AUTO: 0.78 THOUSAND/ÂΜL (ref 0.17–1.22)
MONOCYTES NFR BLD AUTO: 9 % (ref 4–12)
NEUTROPHILS # BLD AUTO: 6.51 THOUSANDS/ÂΜL (ref 1.85–7.62)
NEUTS SEG NFR BLD AUTO: 77 % (ref 43–75)
NRBC BLD AUTO-RTO: 0 /100 WBCS
PLATELET # BLD AUTO: 230 THOUSANDS/UL (ref 149–390)
PMV BLD AUTO: 10.7 FL (ref 8.9–12.7)
POTASSIUM SERPL-SCNC: 3.4 MMOL/L (ref 3.5–5.3)
RBC # BLD AUTO: 4.12 MILLION/UL (ref 3.81–5.12)
SODIUM SERPL-SCNC: 140 MMOL/L (ref 135–147)
WBC # BLD AUTO: 8.57 THOUSAND/UL (ref 4.31–10.16)

## 2023-10-31 PROCEDURE — 80048 BASIC METABOLIC PNL TOTAL CA: CPT | Performed by: EMERGENCY MEDICINE

## 2023-10-31 PROCEDURE — 36415 COLL VENOUS BLD VENIPUNCTURE: CPT | Performed by: EMERGENCY MEDICINE

## 2023-10-31 PROCEDURE — 86618 LYME DISEASE ANTIBODY: CPT | Performed by: EMERGENCY MEDICINE

## 2023-10-31 PROCEDURE — 99283 EMERGENCY DEPT VISIT LOW MDM: CPT

## 2023-10-31 PROCEDURE — 85025 COMPLETE CBC W/AUTO DIFF WBC: CPT | Performed by: EMERGENCY MEDICINE

## 2023-10-31 PROCEDURE — 99284 EMERGENCY DEPT VISIT MOD MDM: CPT | Performed by: EMERGENCY MEDICINE

## 2023-10-31 RX ORDER — DOXYCYCLINE HYCLATE 100 MG/1
100 CAPSULE ORAL 2 TIMES DAILY
Qty: 20 CAPSULE | Refills: 0 | Status: SHIPPED | OUTPATIENT
Start: 2023-10-31 | End: 2023-11-10

## 2023-10-31 NOTE — ED PROVIDER NOTES
History  Chief Complaint   Patient presents with    Rash     Pt states starting with red rash to RLE, concerned that its spreading. Denies any trauma. Patient with redness and medial aspect of the right lower extremity. This happened yesterday and then today seemed worse. Patient is concerned that she may have been bitten by a spider although she does not recall any insect bite. Denies any trauma to the area. No history of tick bites. There is a dog in the house. No fevers or chills. She does have some lower extremity edema however she said she did not take her diuretic this morning because she had to be in court and was concerned about having to use the bathroom frequently. No shortness of breath or other symptoms. Differential diagnosis includes cellulitis, Lyme. Prior to Admission Medications   Prescriptions Last Dose Informant Patient Reported? Taking? Ergocalciferol (Vitamin D2) 50 MCG (2000 UT) TABS   Yes No   Etanercept (Enbrel Mini) 50 MG/ML SOCT   Yes No   LUMIGAN 0.01 % ophthalmic drops   Yes No   Xarelto 10 MG tablet   Yes No   Sig: Take 10 mg by mouth daily   acetaminophen (TYLENOL) 650 mg CR tablet   No No   Sig: Take 1 tablet (650 mg total) by mouth every 8 (eight) hours as needed for mild pain   aspirin (ECOTRIN) 325 mg EC tablet   Yes No   Sig: Take 325 mg by mouth daily.    ergocalciferol (VITAMIN D2) 50,000 units   Yes No   Sig: Take 50,000 Units by mouth once a week   folic acid (FOLVITE) 1 mg tablet   Yes No   Sig: Take by mouth daily   lidocaine (Lidoderm) 5 %   No No   Sig: Apply 1 patch topically daily Remove & Discard patch within 12 hours or as directed by MD   methocarbamol (ROBAXIN) 500 mg tablet   No No   Sig: Take 1 tablet (500 mg total) by mouth 3 (three) times a day as needed for muscle spasms   methotrexate 2.5 mg tablet   Yes No   predniSONE 2.5 mg tablet   Yes No   rivaroxaban (Xarelto Starter Pack) 15 & 20 MG starter pack   No No   Sig: Take 1 Package by mouth daily      Facility-Administered Medications: None       Past Medical History:   Diagnosis Date    Encephalitis     History of blood clot in brain        Past Surgical History:   Procedure Laterality Date    CHOLECYSTECTOMY      CYST REMOVAL Right     wrist    HEMORROIDECTOMY         History reviewed. No pertinent family history. I have reviewed and agree with the history as documented. E-Cigarette/Vaping    E-Cigarette Use Never User      E-Cigarette/Vaping Substances    Nicotine No     THC No     CBD No     Flavoring No     Other No     Unknown No      Social History     Tobacco Use    Smoking status: Former    Smokeless tobacco: Never   Vaping Use    Vaping Use: Never used   Substance Use Topics    Alcohol use: Not Currently    Drug use: Not Currently       Review of Systems   Constitutional:  Negative for activity change, appetite change and fever. HENT:  Negative for congestion, ear pain, rhinorrhea and sore throat. Eyes:  Negative for pain and redness. Respiratory:  Negative for cough, shortness of breath and wheezing. Cardiovascular:  Negative for chest pain and palpitations. Gastrointestinal:  Negative for abdominal pain, diarrhea, nausea and vomiting. Endocrine: Negative for polyuria. Genitourinary:  Negative for difficulty urinating, dysuria, frequency and urgency. Musculoskeletal:  Negative for arthralgias and myalgias. Skin:  Positive for color change and rash. Allergic/Immunologic: Negative for immunocompromised state. Neurological:  Negative for dizziness, syncope and light-headedness. Hematological:  Does not bruise/bleed easily. Psychiatric/Behavioral:  Negative for confusion. All other systems reviewed and are negative. Physical Exam  Physical Exam  Vitals and nursing note reviewed. Constitutional:       General: She is not in acute distress. Appearance: She is well-developed. HENT:      Head: Normocephalic and atraumatic.       Nose: Nose normal. Eyes:      General: No scleral icterus. Conjunctiva/sclera: Conjunctivae normal.   Cardiovascular:      Rate and Rhythm: Normal rate and regular rhythm. Heart sounds: Normal heart sounds. Pulmonary:      Effort: Pulmonary effort is normal. No respiratory distress. Breath sounds: Normal breath sounds. No stridor. No wheezing. Abdominal:      General: There is no distension. Palpations: Abdomen is soft. Tenderness: There is no abdominal tenderness. There is no guarding or rebound. Musculoskeletal:         General: No tenderness or deformity. Cervical back: Normal range of motion and neck supple. Right lower leg: Edema present. Left lower leg: Edema present. Skin:     General: Skin is warm and dry. Findings: Erythema and rash present. Comments: Right lower leg with erythema medial aspect. This is slightly warm to touch. No necrotic tissue. No blistering. Nontender. Neurological:      Mental Status: She is alert and oriented to person, place, and time. Psychiatric:         Thought Content:  Thought content normal.         Vital Signs  ED Triage Vitals [10/31/23 1319]   Temperature Pulse Respirations Blood Pressure SpO2   97.7 °F (36.5 °C) 102 16 156/84 96 %      Temp Source Heart Rate Source Patient Position - Orthostatic VS BP Location FiO2 (%)   Temporal Monitor Sitting Left arm --      Pain Score       --           Vitals:    10/31/23 1319   BP: 156/84   Pulse: 102   Patient Position - Orthostatic VS: Sitting         Visual Acuity      ED Medications  Medications - No data to display    Diagnostic Studies  Results Reviewed       Procedure Component Value Units Date/Time    Basic metabolic panel [[id number]]  (Abnormal) Collected: 10/31/23 1346    Lab Status: Final result Specimen: Blood from Hand, Left Updated: 10/31/23 1412     Sodium 140 mmol/L      Potassium 3.4 mmol/L      Chloride 106 mmol/L      CO2 25 mmol/L      ANION GAP 9 mmol/L      BUN 16 mg/dL      Creatinine 0.79 mg/dL      Glucose 113 mg/dL      Calcium 10.0 mg/dL      eGFR 75 ml/min/1.73sq m     Narrative:      National Kid[employer] guidelines for Chronic Kidney Disease (CKD):     Stage 1 with normal or high GFR (GFR > 90 mL/min/1.73 square meters)    Stage 2 Mild CKD (GFR = 60-89 mL/min/1.73 square meters)    Stage 3A Moderate CKD (GFR = 45-59 mL/min/1.73 square meters)    Stage 3B Moderate CKD (GFR = 30-44 mL/min/1.73 square meters)    Stage 4 Severe CKD (GFR = 15-29 mL/min/1.73 square meters)    Stage 5 End Stage CKD (GFR <15 mL/min/1.73 square meters)  Note: GFR calculation is accurate only with a steady state creatinine    CBC and differential [[id number]]  (Abnormal) Collected: 10/31/23 1346    Lab Status: Final result Specimen: Blood from Hand, Left Updated: 10/31/23 1351     WBC 8.57 Thousand/uL      RBC 4.12 Million/uL      Hemoglobin 13.5 g/dL      Hematocrit 41.1 %       fL      MCH 32.8 pg      MCHC 32.8 g/dL      RDW 13.2 %      MPV 10.7 fL      Platelets 964 Thousands/uL      nRBC 0 /100 WBCs      Neutrophils Relative 77 %      Immat GRANS % 0 %      Lymphocytes Relative 14 %      Monocytes Relative 9 %      Eosinophils Relative 0 %      Basophils Relative 0 %      Neutrophils Absolute 6.51 Thousands/µL      Immature Grans Absolute 0.03 Thousand/uL      Lymphocytes Absolute 1.21 Thousands/µL      Monocytes Absolute 0.78 Thousand/µL      Eosinophils Absolute 0.02 Thousand/µL      Basophils Absolute 0.02 Thousands/µL     Lyme Total AB W Reflex to IGM/IGG [[id number]] Collected: 10/31/23 1346    Lab Status: In process Specimen: Blood from Hand, Left Updated: 10/31/23 1349    Narrative: The following orders were created for panel order Lyme Total AB W Reflex to IGM/IGG.   Procedure                               Abnormality         Status                     ---------                               -----------         ------                     Lyme Total AB W Reflex t...[[id number]]                      In process                   Please view results for these tests on the individual orders. Lyme Total AB W Reflex to IGM/IGG [[id number]] Collected: 10/31/23 1346    Lab Status: In process Specimen: Blood from Hand, Left Updated: 10/31/23 1349                   No orders to display              Procedures  Procedures         ED Course  ED Course as of 10/31/23 1735   Tue Oct 31, 2023   1429 WBC is within normal limits. We will cover with doxycycline. Ink line was drawn around rash. Patient was advised to return if worsening symptoms. Medical Decision Making  Patient with right lower leg erythema. Suspicious of cellulitis. WBC is within normal limits. Lyme test is pending. We will cover patient with doxycycline. Incline was drawn. Patient has strict instructions to return if fevers or spreading of redness. Amount and/or Complexity of Data Reviewed  External Data Reviewed: notes. Details: Outpatient medications were reviewed. Labs: ordered. Decision-making details documented in ED Course. Risk  Prescription drug management. Disposition  Final diagnoses:   Cellulitis of right leg     Time reflects when diagnosis was documented in both MDM as applicable and the Disposition within this note       Time User Action Codes Description Comment    10/31/2023  2:32 PM Cherry Hyde Add [L03.115] Cellulitis of right leg           ED Disposition       ED Disposition   Discharge    Condition   Stable    Date/Time   Tue Oct 31, 2023  2:32 PM    Comment   Shanta Wright discharge to home/self care.                    Follow-up Information       Follow up With Specialties Details Why [place], DO Family Medicine In 1 week For re-evaluation and follow-up for this visit [place] [address]  [phone]              Discharge Medication List as of 10/31/2023  2:34 PM        START taking these medications    Details   doxycycline hyclate (VIBRAMYCIN) 100 mg capsule Take 1 capsule (100 mg total) by mouth 2 (two) times a day for 10 days, Starting Tue 10/31/2023, Until Fri 11/10/2023, Normal           CONTINUE these medications which have NOT CHANGED    Details   acetaminophen (TYLENOL) 650 mg CR tablet Take 1 tablet (650 mg total) by mouth every 8 (eight) hours as needed for mild pain, Starting Sat 8/6/2022, Normal      aspirin (ECOTRIN) 325 mg EC tablet Take 325 mg by mouth daily. , Until Discontinued, Historical Med      Ergocalciferol (Vitamin D2) 50 MCG (2000 UT) TABS Starting Fri 6/30/2023, Historical Med      ergocalciferol (VITAMIN D2) 50,000 units Take 50,000 Units by mouth once a week, Starting Fri 6/30/2023, Historical Med      Etanercept (Enbrel Mini) 50 MG/ML SOCT Starting Tue 3/1/2022, Historical Med      folic acid (FOLVITE) 1 mg tablet Take by mouth daily, Historical Med      lidocaine (Lidoderm) 5 % Apply 1 patch topically daily Remove & Discard patch within 12 hours or as directed by MD, Starting Sat 8/6/2022, Normal      LUMIGAN 0.01 % ophthalmic drops Starting Mon 3/12/2018, Historical Med      methocarbamol (ROBAXIN) 500 mg tablet Take 1 tablet (500 mg total) by mouth 3 (three) times a day as needed for muscle spasms, Starting Wed 11/24/2021, Normal      methotrexate 2.5 mg tablet Historical Med      predniSONE 2.5 mg tablet Starting Tue 3/1/2022, Historical Med      rivaroxaban (Xarelto Starter Pack) 15 & 20 MG starter pack Take 1 Package by mouth daily, Starting Mon 11/22/2021, Normal      Xarelto 10 MG tablet Take 10 mg by mouth daily, Starting Fri 7/28/2023, Historical Med             No discharge procedures on file.     PDMP Review       None            ED Provider  Electronically Signed by             Pam Childs MD  10/31/23 8714

## 2023-10-31 NOTE — DISCHARGE INSTRUCTIONS
Return to the emergency department immediately if the redness/rash extends beyond the ink line drawn. Return if you have fevers or worsening symptoms.

## 2023-11-06 ENCOUNTER — APPOINTMENT (OUTPATIENT)
Dept: LAB | Facility: CLINIC | Age: 73
End: 2023-11-06
Payer: MEDICARE

## 2023-11-06 DIAGNOSIS — M06.4 INFLAMMATORY POLYARTHROPATHY (HCC): ICD-10-CM

## 2023-11-06 DIAGNOSIS — Z79.899 ENCOUNTER FOR LONG-TERM (CURRENT) USE OF OTHER MEDICATIONS: ICD-10-CM

## 2023-11-06 LAB
ALBUMIN SERPL BCP-MCNC: 4 G/DL (ref 3.5–5)
ALP SERPL-CCNC: 60 U/L (ref 34–104)
ALT SERPL W P-5'-P-CCNC: 26 U/L (ref 7–52)
ANION GAP SERPL CALCULATED.3IONS-SCNC: 7 MMOL/L
AST SERPL W P-5'-P-CCNC: 17 U/L (ref 13–39)
BASOPHILS # BLD AUTO: 0.03 THOUSANDS/ÂΜL (ref 0–0.1)
BASOPHILS NFR BLD AUTO: 0 % (ref 0–1)
BILIRUB SERPL-MCNC: 0.91 MG/DL (ref 0.2–1)
BUN SERPL-MCNC: 24 MG/DL (ref 5–25)
CALCIUM SERPL-MCNC: 9.3 MG/DL (ref 8.4–10.2)
CHLORIDE SERPL-SCNC: 105 MMOL/L (ref 96–108)
CO2 SERPL-SCNC: 28 MMOL/L (ref 21–32)
CREAT SERPL-MCNC: 0.77 MG/DL (ref 0.6–1.3)
CRP SERPL QL: 4.4 MG/L
EOSINOPHIL # BLD AUTO: 0.03 THOUSAND/ÂΜL (ref 0–0.61)
EOSINOPHIL NFR BLD AUTO: 0 % (ref 0–6)
ERYTHROCYTE [DISTWIDTH] IN BLOOD BY AUTOMATED COUNT: 13.3 % (ref 11.6–15.1)
ERYTHROCYTE [SEDIMENTATION RATE] IN BLOOD: 32 MM/HOUR (ref 0–29)
GFR SERPL CREATININE-BSD FRML MDRD: 77 ML/MIN/1.73SQ M
GLUCOSE P FAST SERPL-MCNC: 94 MG/DL (ref 65–99)
HCT VFR BLD AUTO: 42.1 % (ref 34.8–46.1)
HGB BLD-MCNC: 13.9 G/DL (ref 11.5–15.4)
IMM GRANULOCYTES # BLD AUTO: 0.03 THOUSAND/UL (ref 0–0.2)
IMM GRANULOCYTES NFR BLD AUTO: 0 % (ref 0–2)
LYMPHOCYTES # BLD AUTO: 1.25 THOUSANDS/ÂΜL (ref 0.6–4.47)
LYMPHOCYTES NFR BLD AUTO: 16 % (ref 14–44)
MCH RBC QN AUTO: 33.4 PG (ref 26.8–34.3)
MCHC RBC AUTO-ENTMCNC: 33 G/DL (ref 31.4–37.4)
MCV RBC AUTO: 101 FL (ref 82–98)
MONOCYTES # BLD AUTO: 0.81 THOUSAND/ÂΜL (ref 0.17–1.22)
MONOCYTES NFR BLD AUTO: 10 % (ref 4–12)
NEUTROPHILS # BLD AUTO: 5.66 THOUSANDS/ÂΜL (ref 1.85–7.62)
NEUTS SEG NFR BLD AUTO: 74 % (ref 43–75)
NRBC BLD AUTO-RTO: 0 /100 WBCS
PLATELET # BLD AUTO: 224 THOUSANDS/UL (ref 149–390)
PMV BLD AUTO: 11.6 FL (ref 8.9–12.7)
POTASSIUM SERPL-SCNC: 3.7 MMOL/L (ref 3.5–5.3)
PROT SERPL-MCNC: 6.5 G/DL (ref 6.4–8.4)
RBC # BLD AUTO: 4.16 MILLION/UL (ref 3.81–5.12)
SODIUM SERPL-SCNC: 140 MMOL/L (ref 135–147)
WBC # BLD AUTO: 7.81 THOUSAND/UL (ref 4.31–10.16)

## 2023-11-06 PROCEDURE — 80053 COMPREHEN METABOLIC PANEL: CPT

## 2023-11-06 PROCEDURE — 85652 RBC SED RATE AUTOMATED: CPT

## 2023-11-06 PROCEDURE — 85025 COMPLETE CBC W/AUTO DIFF WBC: CPT

## 2023-11-06 PROCEDURE — 36415 COLL VENOUS BLD VENIPUNCTURE: CPT

## 2023-11-06 PROCEDURE — 86140 C-REACTIVE PROTEIN: CPT

## 2023-11-22 ENCOUNTER — HOSPITAL ENCOUNTER (OUTPATIENT)
Dept: NON INVASIVE DIAGNOSTICS | Facility: HOSPITAL | Age: 73
Discharge: HOME | End: 2023-11-22
Payer: MEDICARE

## 2023-11-22 VITALS
BODY MASS INDEX: 37.22 KG/M2 | WEIGHT: 218 LBS | DIASTOLIC BLOOD PRESSURE: 76 MMHG | HEIGHT: 64 IN | SYSTOLIC BLOOD PRESSURE: 124 MMHG | HEART RATE: 71 BPM

## 2023-11-22 DIAGNOSIS — R00.2 PALPITATIONS: ICD-10-CM

## 2023-11-22 LAB
AORTIC ROOT: 3 CM
APICAL FOUR CHAMBER EJECTION FRACTION: 59 %
E WAVE DECELERATION TIME: 209 MS
E/A RATIO: 0.84
FRACTIONAL SHORTENING: 33 (ref 28–44)
INTERVENTRICULAR SEPTUM IN DIASTOLE (PARASTERNAL SHORT AXIS VIEW): 0.9 CM
INTERVENTRICULAR SEPTUM: 0.9 CM (ref 0.6–1.1)
LAAS-AP2: 10.2 CM2
LAAS-AP4: 8.9 CM2
LEFT ATRIUM SIZE: 3.3 CM
LEFT ATRIUM VOLUME (MOD BIPLANE): 20 ML
LEFT ATRIUM VOLUME INDEX (MOD BIPLANE): 9.9 ML/M2
LEFT INTERNAL DIMENSION IN SYSTOLE: 3 CM (ref 2.1–4)
LEFT VENTRICULAR INTERNAL DIMENSION IN DIASTOLE: 4.5 CM (ref 3.5–6)
LEFT VENTRICULAR POSTERIOR WALL IN END DIASTOLE: 0.9 CM
LEFT VENTRICULAR STROKE VOLUME: 59 ML
LVSV (TEICH): 59 ML
MV E'TISSUE VEL-SEP: 8 CM/S
MV PEAK A VEL: 0.83 M/S
MV PEAK E VEL: 70 CM/S
MV STENOSIS PRESSURE HALF TIME: 61 MS
MV VALVE AREA P 1/2 METHOD: 3.61
RIGHT ATRIUM AREA SYSTOLE A4C: 9.3 CM2
RIGHT VENTRICLE ID DIMENSION: 2.7 CM
SL CV LEFT ATRIUM LENGTH A2C: 3.6 CM
SL CV LV EF: 65
SL CV PED ECHO LEFT VENTRICLE DIASTOLIC VOLUME (MOD BIPLANE) 2D: 95 ML
SL CV PED ECHO LEFT VENTRICLE SYSTOLIC VOLUME (MOD BIPLANE) 2D: 36 ML
TRICUSPID ANNULAR PLANE SYSTOLIC EXCURSION: 1.8 CM

## 2023-11-22 PROCEDURE — 93306 TTE W/DOPPLER COMPLETE: CPT | Performed by: INTERNAL MEDICINE

## 2023-11-22 PROCEDURE — 93306 TTE W/DOPPLER COMPLETE: CPT

## 2023-12-11 ENCOUNTER — HOSPITAL ENCOUNTER (OUTPATIENT)
Dept: RADIOLOGY | Facility: HOSPITAL | Age: 73
Discharge: HOME | End: 2023-12-11
Attending: FAMILY MEDICINE
Payer: MEDICARE

## 2023-12-11 ENCOUNTER — HOSPITAL ENCOUNTER (OUTPATIENT)
Dept: NON INVASIVE DIAGNOSTICS | Facility: HOSPITAL | Age: 73
Discharge: HOME | End: 2023-12-11
Attending: FAMILY MEDICINE
Payer: MEDICARE

## 2023-12-11 DIAGNOSIS — R00.2 PALPITATIONS: ICD-10-CM

## 2023-12-11 DIAGNOSIS — R22.42 MASS OF LOWER LEG, LEFT: ICD-10-CM

## 2023-12-11 PROCEDURE — 73590 X-RAY EXAM OF LOWER LEG: CPT

## 2023-12-11 PROCEDURE — 93226 XTRNL ECG REC<48 HR SCAN A/R: CPT

## 2023-12-11 PROCEDURE — 93225 XTRNL ECG REC<48 HRS REC: CPT

## 2023-12-14 PROCEDURE — 93227 XTRNL ECG REC<48 HR R&I: CPT | Performed by: INTERNAL MEDICINE

## 2023-12-19 ENCOUNTER — OFFICE VISIT (OUTPATIENT)
Dept: OBGYN CLINIC | Facility: CLINIC | Age: 73
End: 2023-12-19
Payer: MEDICARE

## 2023-12-19 VITALS
RESPIRATION RATE: 16 BRPM | HEIGHT: 64 IN | WEIGHT: 210 LBS | BODY MASS INDEX: 35.85 KG/M2 | DIASTOLIC BLOOD PRESSURE: 77 MMHG | SYSTOLIC BLOOD PRESSURE: 135 MMHG | HEART RATE: 72 BPM

## 2023-12-19 DIAGNOSIS — M17.12 PRIMARY OSTEOARTHRITIS OF LEFT KNEE: ICD-10-CM

## 2023-12-19 DIAGNOSIS — E66.01 OBESITY, MORBID (HCC): ICD-10-CM

## 2023-12-19 DIAGNOSIS — L03.116 CELLULITIS OF LEFT LOWER EXTREMITY: Primary | ICD-10-CM

## 2023-12-19 PROCEDURE — 99214 OFFICE O/P EST MOD 30 MIN: CPT | Performed by: STUDENT IN AN ORGANIZED HEALTH CARE EDUCATION/TRAINING PROGRAM

## 2023-12-19 RX ORDER — CIPROFLOXACIN 500 MG/1
500 TABLET, FILM COATED ORAL 2 TIMES DAILY
COMMUNITY
Start: 2023-12-08

## 2023-12-19 RX ORDER — FUROSEMIDE 20 MG/1
TABLET ORAL
COMMUNITY

## 2023-12-19 RX ORDER — DOXYCYCLINE HYCLATE 100 MG/1
100 TABLET, DELAYED RELEASE ORAL 2 TIMES DAILY
Qty: 20 TABLET | Refills: 0 | Status: SHIPPED | OUTPATIENT
Start: 2023-12-19 | End: 2023-12-29

## 2023-12-19 NOTE — PROGRESS NOTES
Orthopedic Surgery Office Note  Raquel Wright (73 y.o. female)   : 1950   MRN: [id number]   Encounter Date: 2023 with Dr. Edgar Romero,   Chief Complaint   Patient presents with    Left Knee - Follow-up    Left Leg - Pain     Patient recently had a fall hurting her left leg on 12/3/23 on the shin area.       Assessment, Plan, & Discussion:     Cellulitis of left lower extremity  Osteoarthritis of left knee      Plan:   Return if symptoms worsen or fail to improve.  Discussed with patient she may be experiencing cellulitis of the left lower extremity after her fall on 12/3/23.   Discussed with patient that she should continue to meet with PCP about vascular issues.   Doxycycline was prescribed for patient at today's visit.   Patient will continue to monitor symptoms over the next 7 days.  If patient does not experience relief in symptoms she will call to make a follow-up appointment.  Patient may follow-up as needed for her left knee osteoarthritis as she continues to have symptomatic relief from previous steroid injection administered 2023.    Surgery:   No surgery planned at this time      Orders:     Diagnoses and all orders for this visit:    Cellulitis of left lower extremity    Primary osteoarthritis of left knee    Other orders  -     Lasix 20 MG tablet  -     ciprofloxacin (CIPRO) 500 mg tablet; Take 500 mg by mouth 2 (two) times a day (Patient not taking: Reported on 2023)         History of Present Illness:     Raquel Wright is a 73 y.o. female who presents for follow up regarding left knee pain.  Patient was last seen 2023 where steroid injection was administered to the left knee. Patient states her left knee has been doing great from her previous steroid injection and still has relief and reports no pain into the left knee.    Patient states on 12/3/2023 patient tripped going up the steps hitting her shin had tremendous amount of swelling and bruising of the left lower  "extremity.  Patient states since then she has been elevating and icing her leg which has decreased.  Patient states she did see her PCP 12/11/2023 where she obtained left tib-fib x-rays. However patient states she still has redness and extremely sensitive over her distal shin.  Patient states it has been difficult for her to walk due to having pain in her shin.  Patient states she only takes Tylenol as needed for pain and cannot take any NSAIDs due to being on blood thinners.  Patient does states she did have cellulitis of her right leg back on 10/31/2023 where she was given doxycycline and states this potentially could be a similar situation.      Review of Systems  Constitutional: Negative for fatigue, fever or loss of appetite.   HENT: Negative.    Respiratory: Negative for shortness of breath, dyspnea.    Cardiovascular: Negative for chest pain/tightness.   Gastrointestinal: Negative for abdominal pain, N/V.   Endocrine: Negative for cold/heat intolerance, unexplained weight loss/gain.   Genitourinary: Negative for flank pain, dysuria  Skin: Negative for rash.    Psychiatric/Behavioral: Negative for agitation.  All else negative unless otherwise noted in HPI    Physical Exam:   General:  /77   Pulse 72   Resp 16   Ht 5' 4\" (1.626 m)   Wt 95.3 kg (210 lb)   BMI 36.05 kg/m²   Cons: Appears well.  No apparent distress.  Psych: Alert. Oriented x3.  Mood and affect normal.  Eyes: PERRLA, EOMI  Resp: Normal effort.  No audible wheezing or stridor.  CV: Extremities warm and well perfused.   Abd:    No distention or guarding.   Skin: Warm. No visible lesions.  Neuro: Normal muscle tone.      Orthopedic Exam:   Left Lower Extremity Exam  Alignment:  Normal knee alignment. Normal ankle alignment.  Inspection:  mild swelling and redness over distal tibia.   Palpation:  Tender over distal tibia   ROM:  Normal knee ROM.  Strength:  5/5 quadriceps and hamstrings.  Stability:  Not tested.  Neurovascular:  Sensation " intact in Ax/R/M/U nerve distributions. 2+ DP & PT pulses.  Gait:  Normal.         Imaging/Studies:     Study: XR left Tibia fibula   Date: 12/11/23  Report: I have read and agree with the radiologist report.  I have personally reviewed imaging and my impression is as follows: No acute osseous abnormality. No fracture/dislocation    Procedures  No procedures today.      Medical, Surgical, Family, and Social History    The patient's medical history, family history, and social history, were reviewed and updated as appropriate.    Past Medical History:   Diagnosis Date    Encephalitis     History of blood clot in brain     Rheumatic disease     Rheumatoid arthritis (HCC)      Past Surgical History:   Procedure Laterality Date    CHOLECYSTECTOMY      CYST REMOVAL Right     wrist    HAND SURGERY  1990    HEMORROIDECTOMY      WRIST SURGERY  1990     History reviewed. No pertinent family history.  Social History     Occupational History    Not on file   Tobacco Use    Smoking status: Former    Smokeless tobacco: Never   Vaping Use    Vaping status: Never Used   Substance and Sexual Activity    Alcohol use: Never    Drug use: Never    Sexual activity: Not Currently     Allergies   Allergen Reactions    Percocet [Oxycodone-Acetaminophen] GI Intolerance    Sulfa Antibiotics      Uncertain allergy       Current Outpatient Medications:     Etanercept (Enbrel Mini) 50 MG/ML SOCT, , Disp: , Rfl:     folic acid (FOLVITE) 1 mg tablet, Take by mouth daily, Disp: , Rfl:     Lasix 20 MG tablet, , Disp: , Rfl:     LUMIGAN 0.01 % ophthalmic drops, , Disp: , Rfl:     methotrexate 2.5 mg tablet, , Disp: , Rfl:     predniSONE 2.5 mg tablet, , Disp: , Rfl:     Xarelto 10 MG tablet, Take 10 mg by mouth daily, Disp: , Rfl:     ciprofloxacin (CIPRO) 500 mg tablet, Take 500 mg by mouth 2 (two) times a day (Patient not taking: Reported on 12/19/2023), Disp: , Rfl:       This Visit:     20 minutes was spent in the coordination of care, reviewing  of imaging and with the patient on the date of service        Scribe Attestation      I,:  Atul Escobar am acting as a scribe while in the presence of the attending physician.:       I,:  Edgar Romero DO personally performed the services described in this documentation    as scribed in my presence.:             Dr. Edgar Romero DO, Orthopedic Surgeon  [facility]

## 2023-12-20 ENCOUNTER — TELEPHONE (OUTPATIENT)
Age: 73
End: 2023-12-20

## 2023-12-20 DIAGNOSIS — L03.116 CELLULITIS OF LEFT LOWER EXTREMITY: Primary | ICD-10-CM

## 2023-12-20 RX ORDER — DOXYCYCLINE 100 MG/1
100 CAPSULE ORAL 2 TIMES DAILY
Qty: 20 CAPSULE | Refills: 0 | Status: SHIPPED | OUTPATIENT
Start: 2023-12-20 | End: 2023-12-30

## 2023-12-20 NOTE — TELEPHONE ENCOUNTER
Caller: patient    Doctor: Nick    Reason for call: Pharmacy needs Dr authorization for RX of doxycycline (DORYX) 100 MG EC tablet [[id number]]     Call back#: [phone]  Pharmacy [facility] phone #[phone]

## 2024-01-05 ENCOUNTER — OFFICE VISIT (OUTPATIENT)
Dept: OBGYN CLINIC | Facility: CLINIC | Age: 74
End: 2024-01-05
Payer: MEDICARE

## 2024-01-05 VITALS
DIASTOLIC BLOOD PRESSURE: 83 MMHG | SYSTOLIC BLOOD PRESSURE: 136 MMHG | WEIGHT: 208 LBS | RESPIRATION RATE: 16 BRPM | BODY MASS INDEX: 35.51 KG/M2 | HEIGHT: 64 IN | HEART RATE: 74 BPM

## 2024-01-05 DIAGNOSIS — L03.116 CELLULITIS OF LEFT LOWER EXTREMITY: Primary | ICD-10-CM

## 2024-01-05 PROCEDURE — 99213 OFFICE O/P EST LOW 20 MIN: CPT | Performed by: STUDENT IN AN ORGANIZED HEALTH CARE EDUCATION/TRAINING PROGRAM

## 2024-01-05 NOTE — PROGRESS NOTES
"Orthopedic Surgery Office Note  Raquel Wright (73 y.o. female)   : 1950   MRN: [id number]   Encounter Date: 2024 with Dr. Edgar Romero, DO  Chief Complaint   Patient presents with    Left Leg - Follow-up       Assessment, Plan, & Discussion:     Cellulitis of left lower extremity  Osteoarthritis of left knee      Plan:   Return in about 2 weeks (around 2024) for Recheck.  Discussed with patient she is likely experiencing cellulitis of the left lower extremity after her fall on 12/3/23.   Discussed with patient that she should continue to meet with PCP about vascular issues.   Explained that the swelling is likely due to the veins not working as efficiently as before the fall and that they are not able to adequately pump the fluid out of the area, leaving residual swelling  Explained to patient that she may always have a \"bump\" there  Doxycycline was completed as of 23, which she states has helped significantly  Patient will continue to monitor symptoms over the next 14 days.  If patient does not experience continued progression in relief in symptoms she will keep follow-up appointment.  Patient may follow-up as needed for her left knee osteoarthritis as she continues to have symptomatic relief from previous steroid injection administered 2023.  The patient expresses understanding and is in agreement with today's treatment plan.         Surgery:   No surgery planned at this time      Orders:     There are no diagnoses linked to this encounter.       History of Present Illness:     Raquel Wright is a 73 y.o. female who presents for follow up regarding left knee pain.  Patient was previously seen on 23 where she received a CS injection to the left knee. Patient was most recently seen on 2023 where steroid injection was administered to the left knee. Patient states her left knee has been doing great from her previous steroid injection and still has relief and reports no pain " "into the left knee.    Patient states on 12/3/2023 patient tripped going up the steps hitting her shin had tremendous amount of swelling and bruising of the left lower extremity.  Patient states since then she has been elevating and icing her leg which has decreased.  Patient states she did see her PCP 12/11/2023 where she obtained left tib-fib x-rays. However patient states she still has redness and extremely sensitive over her distal shin.  Patient states it has been difficult for her to walk due to having pain in her shin.  Patient states she only takes Tylenol as needed for pain and cannot take any NSAIDs due to being on blood thinners.  Patient does states she did have cellulitis of her right leg back on 10/31/2023 where she was given doxycycline and states this potentially could be a similar situation.    Patient was last seen on 12/19/23 concerned for her left lower leg. She was given a prescription for Doxycycline for 7 days that she completed and states has helped slightly. She states that she is concerned with it being red and still swelling. She reports that her pain has decreased and the direct area is not as sensitive as it was 2 weeks ago.    Review of Systems  Constitutional: Negative for fatigue, fever or loss of appetite.   HENT: Negative.    Respiratory: Negative for shortness of breath, dyspnea.    Cardiovascular: Negative for chest pain/tightness.   Gastrointestinal: Negative for abdominal pain, N/V.   Endocrine: Negative for cold/heat intolerance, unexplained weight loss/gain.   Genitourinary: Negative for flank pain, dysuria  Skin: Negative for rash.    Psychiatric/Behavioral: Negative for agitation.  All else negative unless otherwise noted in HPI    Physical Exam:   General:  /83   Pulse 74   Resp 16   Ht 5' 4\" (1.626 m)   Wt 94.3 kg (208 lb)   BMI 35.70 kg/m²   Cons: Appears well.  No apparent distress.  Psych: Alert. Oriented x3.  Mood and affect normal.  Eyes: PERRLA, " EOMI  Resp: Normal effort.  No audible wheezing or stridor.  CV: Extremities warm and well perfused.   Abd:    No distention or guarding.   Skin: Warm. No visible lesions.  Neuro: Normal muscle tone.      Orthopedic Exam:   Left Lower Extremity Exam  Alignment:  Normal knee alignment. Normal ankle alignment.  Inspection:  mild swelling and redness over distal tibia.   Palpation:  Tender over distal tibia   ROM:  Normal knee ROM.  Strength:  5/5 quadriceps and hamstrings.  Stability:  Not tested.  Neurovascular:  Sensation intact in Ax/R/M/U nerve distributions. 2+ DP & PT pulses.  Gait:  Normal.         Imaging/Studies:     No new imaging today    Study: XR left Tibia fibula   Date: 12/11/23  Report: I have read and agree with the radiologist report.  I have personally reviewed imaging and my impression is as follows: No acute osseous abnormality. No fracture/dislocation    Procedures  No procedures today.      Medical, Surgical, Family, and Social History    The patient's medical history, family history, and social history, were reviewed and updated as appropriate.    Past Medical History:   Diagnosis Date    Encephalitis     History of blood clot in brain     Rheumatic disease     Rheumatoid arthritis (HCC)      Past Surgical History:   Procedure Laterality Date    CHOLECYSTECTOMY      CYST REMOVAL Right     wrist    HAND SURGERY  1990    HEMORROIDECTOMY      WRIST SURGERY  1990     History reviewed. No pertinent family history.  Social History     Occupational History    Not on file   Tobacco Use    Smoking status: Former    Smokeless tobacco: Never   Vaping Use    Vaping status: Never Used   Substance and Sexual Activity    Alcohol use: Never    Drug use: Never    Sexual activity: Not Currently     Allergies   Allergen Reactions    Percocet [Oxycodone-Acetaminophen] GI Intolerance    Sulfa Antibiotics      Uncertain allergy       Current Outpatient Medications:     Etanercept (Enbrel Mini) 50 MG/ML SOCT, , Disp:  , Rfl:     folic acid (FOLVITE) 1 mg tablet, Take by mouth daily, Disp: , Rfl:     Lasix 20 MG tablet, , Disp: , Rfl:     LUMIGAN 0.01 % ophthalmic drops, , Disp: , Rfl:     methotrexate 2.5 mg tablet, , Disp: , Rfl:     predniSONE 2.5 mg tablet, , Disp: , Rfl:     Xarelto 10 MG tablet, Take 10 mg by mouth daily, Disp: , Rfl:     ciprofloxacin (CIPRO) 500 mg tablet, Take 500 mg by mouth 2 (two) times a day (Patient not taking: Reported on 12/19/2023), Disp: , Rfl:       This Visit:     20 minutes was spent in the coordination of care, reviewing of imaging and with the patient on the date of service        Scribe Attestation      I,:  Alma Rosa Perez am acting as a scribe while in the presence of the attending physician.:       I,:  Edgar Romero DO personally performed the services described in this documentation    as scribed in my presence.:             Dr. Edgar Romero DO, Orthopedic Surgeon  [facility]

## 2024-01-23 ENCOUNTER — OFFICE VISIT (OUTPATIENT)
Dept: OBGYN CLINIC | Facility: CLINIC | Age: 74
End: 2024-01-23
Payer: MEDICARE

## 2024-01-23 VITALS
RESPIRATION RATE: 16 BRPM | BODY MASS INDEX: 36.02 KG/M2 | SYSTOLIC BLOOD PRESSURE: 137 MMHG | DIASTOLIC BLOOD PRESSURE: 80 MMHG | HEART RATE: 71 BPM | WEIGHT: 211 LBS | HEIGHT: 64 IN

## 2024-01-23 DIAGNOSIS — L03.116 CELLULITIS OF LEFT LOWER EXTREMITY: Primary | ICD-10-CM

## 2024-01-23 PROCEDURE — 99213 OFFICE O/P EST LOW 20 MIN: CPT | Performed by: STUDENT IN AN ORGANIZED HEALTH CARE EDUCATION/TRAINING PROGRAM

## 2024-01-23 NOTE — PROGRESS NOTES
"Orthopedic Surgery Office Note  Raquel Wright (73 y.o. female)   : 1950   MRN: [id number]   Encounter Date: 2024 with Dr. Edgar Romero, DO  Chief Complaint   Patient presents with   • Left Leg - Follow-up       Assessment, Plan, & Discussion:     Cellulitis of left lower extremity  - Discussed that overall her lower leg looks significantly better  - Explained that she will likely have increased swelling for a longer period of time due to chronic swelling   - Explained that the swelling is decreasing, it may just be taking a little longer than expected  - The patient expresses understanding and is in agreement with today's treatment plan.       Osteoarthritis of left knee  Continue current management  Last CSI performed on 2023    Plan:   Return if symptoms worsen or fail to improve.  Discussed with patient that she should continue to meet with PCP about vascular issues.   Explained that the swelling is likely due to the veins not working as efficiently as before the fall and that they are not able to adequately pump the fluid out of the area, leaving residual swelling  Explained to patient that she may always have a \"bump\" there  Doxycycline was completed as of 23  Patient may follow-up as needed for her left knee osteoarthritis as she continues to have symptomatic relief from previous steroid injection administered 2023.  The patient expresses understanding and is in agreement with today's treatment plan.         Surgery:   No surgery planned at this time      Orders:     Diagnoses and all orders for this visit:    Cellulitis of left lower extremity         History of Present Illness:     Raquel Wright is a 73 y.o. female who presents for follow up regarding left knee pain.  Patient was previously seen on 23 where she received a CS injection to the left knee. Patient was most recently seen on 2023 where steroid injection was administered to the left knee. Patient states " her left knee has been doing great from her previous steroid injection and still has relief and reports no pain into the left knee.    Previous HPI: Patient states on 12/3/2023 patient tripped going up the steps hitting her shin had tremendous amount of swelling and bruising of the left lower extremity.  Patient states since then she has been elevating and icing her leg which has decreased.  Patient states she did see her PCP 12/11/2023 where she obtained left tib-fib x-rays. However patient states she still has redness and extremely sensitive over her distal shin.  Patient states it has been difficult for her to walk due to having pain in her shin.  Patient states she only takes Tylenol as needed for pain and cannot take any NSAIDs due to being on blood thinners.  Patient does states she did have cellulitis of her right leg back on 10/31/2023 where she was given doxycycline and states this potentially could be a similar situation.  Patient was last seen on 12/19/23 concerned for her left lower leg. She was given a prescription for Doxycycline for 7 days that she completed and states has helped slightly. She states that she is concerned with it being red and still swelling. She reports that her pain has decreased and the direct area is not as sensitive as it was 2 weeks ago.    On presentation today, patient is being seen for a follow-up of cellulitis of the left lower leg. She reports that she has daily pain into her left foot and still has moderate swelling. She states that the pain is not as concerning to her as the swelling. She states that her PCP has increased her dosage of Furosemide from 20-40 mg daily and this has not helped with the swelling. She is able to ambulate without assistance and is able to function through the pain.      Review of Systems  Constitutional: Negative for fatigue, fever or loss of appetite.   HENT: Negative.    Respiratory: Negative for shortness of breath, dyspnea.   "  Cardiovascular: Negative for chest pain/tightness.   Gastrointestinal: Negative for abdominal pain, N/V.   Endocrine: Negative for cold/heat intolerance, unexplained weight loss/gain.   Genitourinary: Negative for flank pain, dysuria  Skin: Negative for rash.    Psychiatric/Behavioral: Negative for agitation.  All else negative unless otherwise noted in HPI    Physical Exam:   General:  /80   Pulse 71   Resp 16   Ht 5' 4\" (1.626 m)   Wt 95.7 kg (211 lb)   BMI 36.22 kg/m²   Cons: Appears well.  No apparent distress.  Psych: Alert. Oriented x3.  Mood and affect normal.  Eyes: PERRLA, EOMI  Resp: Normal effort.  No audible wheezing or stridor.  CV: Extremities warm and well perfused.   Abd:    No distention or guarding.   Skin: Warm. No visible lesions.  Neuro: Normal muscle tone.      Orthopedic Exam:   Left Lower Extremity Exam  Alignment:  Normal knee alignment. Normal ankle alignment.  Inspection:  mild swelling and redness over distal tibia.   Palpation:  Tender over distal tibia   ROM:  Normal knee ROM.  Strength:  5/5 quadriceps and hamstrings.  Stability:  Not tested.  Neurovascular:  Sensation intact in Ax/R/M/U nerve distributions. 2+ DP & PT pulses.  Gait:  Normal.         Imaging/Studies:     No new imaging today    Study: XR left Tibia fibula   Date: 12/11/23  Report: I have read and agree with the radiologist report.  I have personally reviewed imaging and my impression is as follows: No acute osseous abnormality. No fracture/dislocation    Procedures  No procedures today.      Medical, Surgical, Family, and Social History    The patient's medical history, family history, and social history, were reviewed and updated as appropriate.    Past Medical History:   Diagnosis Date   • Encephalitis    • History of blood clot in brain    • Rheumatic disease    • Rheumatoid arthritis (HCC)      Past Surgical History:   Procedure Laterality Date   • CHOLECYSTECTOMY     • CYST REMOVAL Right     wrist   • " HAND SURGERY  1990   • HEMORROIDECTOMY     • WRIST SURGERY  1990     History reviewed. No pertinent family history.  Social History     Occupational History   • Not on file   Tobacco Use   • Smoking status: Former   • Smokeless tobacco: Never   Vaping Use   • Vaping status: Never Used   Substance and Sexual Activity   • Alcohol use: Never   • Drug use: Never   • Sexual activity: Not Currently     Allergies   Allergen Reactions   • Percocet [Oxycodone-Acetaminophen] GI Intolerance   • Sulfa Antibiotics      Uncertain allergy       Current Outpatient Medications:   •  Etanercept (Enbrel Mini) 50 MG/ML SOCT, , Disp: , Rfl:   •  folic acid (FOLVITE) 1 mg tablet, Take by mouth daily, Disp: , Rfl:   •  Lasix 20 MG tablet, , Disp: , Rfl:   •  LUMIGAN 0.01 % ophthalmic drops, , Disp: , Rfl:   •  methotrexate 2.5 mg tablet, , Disp: , Rfl:   •  predniSONE 2.5 mg tablet, , Disp: , Rfl:   •  Xarelto 10 MG tablet, Take 10 mg by mouth daily, Disp: , Rfl:   •  ciprofloxacin (CIPRO) 500 mg tablet, Take 500 mg by mouth 2 (two) times a day (Patient not taking: Reported on 12/19/2023), Disp: , Rfl:       This Visit:     20 minutes was spent in the coordination of care, reviewing of imaging and with the patient on the date of service        Scribe Attestation    I,:  Alma Rosa Perez am acting as a scribe while in the presence of the attending physician.:       I,:  Edgar Romero DO personally performed the services described in this documentation    as scribed in my presence.:           Dr. Edgar Romero DO, Orthopedic Surgeon  [facility]

## 2024-02-09 ENCOUNTER — CONSULT (OUTPATIENT)
Dept: CARDIOLOGY CLINIC | Facility: HOSPITAL | Age: 74
End: 2024-02-09
Payer: MEDICARE

## 2024-02-09 VITALS
BODY MASS INDEX: 36.19 KG/M2 | WEIGHT: 212 LBS | HEART RATE: 78 BPM | HEIGHT: 64 IN | SYSTOLIC BLOOD PRESSURE: 134 MMHG | DIASTOLIC BLOOD PRESSURE: 86 MMHG

## 2024-02-09 DIAGNOSIS — I73.9 CLAUDICATION (HCC): ICD-10-CM

## 2024-02-09 DIAGNOSIS — R00.2 HEART PALPITATIONS: ICD-10-CM

## 2024-02-09 DIAGNOSIS — R60.1 GENERALIZED EDEMA: ICD-10-CM

## 2024-02-09 DIAGNOSIS — I26.92 ACUTE SADDLE PULMONARY EMBOLISM WITHOUT ACUTE COR PULMONALE (HCC): ICD-10-CM

## 2024-02-09 DIAGNOSIS — M06.9 RHEUMATOID ARTHRITIS OF OTHER SITE, UNSPECIFIED WHETHER RHEUMATOID FACTOR PRESENT (HCC): Primary | ICD-10-CM

## 2024-02-09 DIAGNOSIS — E66.01 OBESITY, MORBID (HCC): ICD-10-CM

## 2024-02-09 PROCEDURE — 93000 ELECTROCARDIOGRAM COMPLETE: CPT | Performed by: INTERNAL MEDICINE

## 2024-02-09 PROCEDURE — 99204 OFFICE O/P NEW MOD 45 MIN: CPT | Performed by: INTERNAL MEDICINE

## 2024-02-09 RX ORDER — POTASSIUM CHLORIDE 20 MEQ/1
20 TABLET, EXTENDED RELEASE ORAL DAILY
Qty: 90 TABLET | Refills: 3 | Status: SHIPPED | OUTPATIENT
Start: 2024-02-09

## 2024-02-09 NOTE — PROGRESS NOTES
Raquel Spencekim  1950  [id number]  [facility] [address] [id number] [phone]    1. Rheumatoid arthritis of other site, unspecified whether rheumatoid factor present (HCC)        2. Generalized edema  POCT ECG    VAS ARTERIAL DUPLEX- LOWER LIMB BILATERAL     VAS VENOUS DUPLEX - LOWER LIMB BILATERAL    potassium chloride (Klor-Con M20) 20 mEq tablet      3. Acute saddle pulmonary embolism without acute cor pulmonale (HCC)        4. Obesity, morbid (HCC)        5. Heart palpitations  POCT ECG    VAS ARTERIAL DUPLEX- LOWER LIMB BILATERAL     VAS VENOUS DUPLEX - LOWER LIMB BILATERAL    potassium chloride (Klor-Con M20) 20 mEq tablet      6. Claudication (HCC)  POCT ECG    VAS ARTERIAL DUPLEX- LOWER LIMB BILATERAL     VAS VENOUS DUPLEX - LOWER LIMB BILATERAL    potassium chloride (Klor-Con M20) 20 mEq tablet          Discussion/Summary: She presents today with chief complaint of lower extremity edema.  This is likely multifactorial in nature.  Recent echocardiogram shows no right heart dysfunction or significant tricuspid regurgitation.  I think most of the fluid retention is from the chronic steroid use.  She may also have some diuretic resistance.  I have asked her to take an extra dose of Lasix in the afternoon 2 days a week.  I provided her with some potassium as well.  She will take it every other day.  Due to some pain in the legs and recent trauma I have also recommended due to the chronicity checking arterial inflow and another venous Doppler due to recent trauma.  Rule out DVT she is on low-dose Xarelto.  No evidence of decompensated heart failure.    Interval History: 73-year-old female seen in consultation on behalf of Dr. Rouse for a history of rheumatoid arthritis, pulmonary embolism, chronic lower extremity swelling, recent trauma to the left leg presents for new patient evaluation for leg swelling.  Patient tells me she  has had swollen legs for the last several years.  The swelling is somewhat responsive to diuretics.  It does improve overnight with supine posture.  Denies any chest pain, shortness of breath, lightheadedness, dizziness, or syncope.  Is been no PND orthopnea.  She does have some cramping pain particularly in her left lower leg when she walks.  She has been taking her anticoagulant.  She takes her Lasix every day she was recently changed from 20-40 daily.  Does not have a high sodium intake.  Fluid intake is 64 ounces a day.    Medical Problems       Problem List       Acute pulmonary embolism (HCC)    Rheumatoid arthritis of other site, unspecified whether rheumatoid factor present (HCC)    Obesity, morbid (HCC)    Generalized edema    Heart palpitations    Claudication (HCC)        Past Medical History:   Diagnosis Date    Encephalitis     History of blood clot in brain     Rheumatic disease     Rheumatoid arthritis (HCC)      Social History     Socioeconomic History    Marital status: /Civil Union     Spouse name: Not on file    Number of children: Not on file    Years of education: Not on file    Highest education level: Not on file   Occupational History    Not on file   Tobacco Use    Smoking status: Former    Smokeless tobacco: Never    Tobacco comments:     Only smoked when she was a teenager   Vaping Use    Vaping status: Never Used   Substance and Sexual Activity    Alcohol use: Never    Drug use: Never    Sexual activity: Not Currently   Other Topics Concern    Not on file   Social History Narrative    Not on file     Social Determinants of Health     Financial Resource Strain: Not on file   Food Insecurity: Not on file   Transportation Needs: Not on file   Physical Activity: Not on file   Stress: Not on file   Social Connections: Not on file   Intimate Partner Violence: Not on file   Housing Stability: Not on file      History reviewed. No pertinent family history.  Past Surgical History:  "  Procedure Laterality Date    CHOLECYSTECTOMY      CYST REMOVAL Right     wrist    HAND SURGERY  1990    HEMORROIDECTOMY      WRIST SURGERY  1990       Current Outpatient Medications:     Etanercept (Enbrel Mini) 50 MG/ML SOCT, , Disp: , Rfl:     folic acid (FOLVITE) 1 mg tablet, Take by mouth daily, Disp: , Rfl:     Lasix 20 MG tablet, 40 mg, Disp: , Rfl:     LUMIGAN 0.01 % ophthalmic drops, , Disp: , Rfl:     methotrexate 2.5 mg tablet, , Disp: , Rfl:     potassium chloride (Klor-Con M20) 20 mEq tablet, Take 1 tablet (20 mEq total) by mouth daily, Disp: 90 tablet, Rfl: 3    predniSONE 2.5 mg tablet, , Disp: , Rfl:     Xarelto 10 MG tablet, Take 10 mg by mouth daily, Disp: , Rfl:     ciprofloxacin (CIPRO) 500 mg tablet, Take 500 mg by mouth 2 (two) times a day (Patient not taking: Reported on 12/19/2023), Disp: , Rfl:   Allergies   Allergen Reactions    Percocet [Oxycodone-Acetaminophen] GI Intolerance    Sulfa Antibiotics      Uncertain allergy       Labs:     Chemistry        Component Value Date/Time    K 3.7 11/06/2023 0924     11/06/2023 0924    CO2 28 11/06/2023 0924    BUN 24 11/06/2023 0924    CREATININE 0.77 11/06/2023 0924        Component Value Date/Time    CALCIUM 9.3 11/06/2023 0924    ALKPHOS 60 11/06/2023 0924    AST 17 11/06/2023 0924    ALT 26 11/06/2023 0924            No results found for: \"CHOL\"  Lab Results   Component Value Date    HDL 79 04/10/2023    HDL 78 05/04/2022    HDL 70 10/12/2021     Lab Results   Component Value Date    LDLCALC 83 04/10/2023    LDLCALC 85 05/04/2022    LDLCALC 89 10/12/2021     Lab Results   Component Value Date    TRIG 66 04/10/2023    TRIG 82 05/04/2022    TRIG 72 10/12/2021     No results found for: \"CHOLHDL\"    Imaging: No results found.    ECG:  nsr      ROS    Vitals:    02/09/24 1051   BP: 134/86   Pulse: 78     Vitals:    02/09/24 1051   Weight: 96.2 kg (212 lb)     Height: 5' 4\" (162.6 cm)   Body mass index is 36.39 kg/m².    Physical Exam:  Vital " signs reviewed.  General appearance:  Appears stated age, alert, well appearing and in no distress  HEENT:  PERRLA, EOMI, no scleral icterus, no conjunctival pallor  NECK:  Supple, No elevated JVP, no thyromegaly, no carotid bruits, no JVD  HEART:  Regular rate and rhythm, normal S1/S2, no S3/S4, no murmur or rub, PMI nondisplaced  LUNGS:  Clear to auscultation bilaterally, no wheezes rales or rhonchi  ABDOMEN:  Soft, non-tender, positive bowel sounds, no rebound or guarding, no organomegaly   EXTREMITIES:  Normal range of motion.  No clubbing or cyanosis.+ 1 edema.    VASCULAR:  Normal pedal pulses   SKIN: No lesions or rashes on exposed skin  NEURO:  CN II-XII intact, no focal deficits

## 2024-02-20 ENCOUNTER — APPOINTMENT (OUTPATIENT)
Dept: LAB | Facility: CLINIC | Age: 74
End: 2024-02-20
Payer: MEDICARE

## 2024-02-20 DIAGNOSIS — M06.4 INFLAMMATORY POLYARTHROPATHY (HCC): ICD-10-CM

## 2024-02-20 DIAGNOSIS — Z79.899 ENCOUNTER FOR LONG-TERM (CURRENT) USE OF OTHER MEDICATIONS: ICD-10-CM

## 2024-02-20 LAB
ALBUMIN SERPL BCP-MCNC: 4 G/DL (ref 3.5–5)
ALP SERPL-CCNC: 95 U/L (ref 34–104)
ALT SERPL W P-5'-P-CCNC: 23 U/L (ref 7–52)
ANION GAP SERPL CALCULATED.3IONS-SCNC: 7 MMOL/L
AST SERPL W P-5'-P-CCNC: 20 U/L (ref 13–39)
BASOPHILS # BLD AUTO: 0.02 THOUSANDS/ÂΜL (ref 0–0.1)
BASOPHILS NFR BLD AUTO: 0 % (ref 0–1)
BILIRUB SERPL-MCNC: 0.49 MG/DL (ref 0.2–1)
BUN SERPL-MCNC: 18 MG/DL (ref 5–25)
CALCIUM SERPL-MCNC: 9.5 MG/DL (ref 8.4–10.2)
CHLORIDE SERPL-SCNC: 102 MMOL/L (ref 96–108)
CO2 SERPL-SCNC: 31 MMOL/L (ref 21–32)
CREAT SERPL-MCNC: 0.9 MG/DL (ref 0.6–1.3)
CRP SERPL QL: 2.6 MG/L
EOSINOPHIL # BLD AUTO: 0.05 THOUSAND/ÂΜL (ref 0–0.61)
EOSINOPHIL NFR BLD AUTO: 1 % (ref 0–6)
ERYTHROCYTE [DISTWIDTH] IN BLOOD BY AUTOMATED COUNT: 13.6 % (ref 11.6–15.1)
ERYTHROCYTE [SEDIMENTATION RATE] IN BLOOD: 40 MM/HOUR (ref 0–29)
GFR SERPL CREATININE-BSD FRML MDRD: 64 ML/MIN/1.73SQ M
GLUCOSE SERPL-MCNC: 95 MG/DL (ref 65–140)
HCT VFR BLD AUTO: 42.4 % (ref 34.8–46.1)
HGB BLD-MCNC: 13.6 G/DL (ref 11.5–15.4)
IMM GRANULOCYTES # BLD AUTO: 0.02 THOUSAND/UL (ref 0–0.2)
IMM GRANULOCYTES NFR BLD AUTO: 0 % (ref 0–2)
LYMPHOCYTES # BLD AUTO: 1.48 THOUSANDS/ÂΜL (ref 0.6–4.47)
LYMPHOCYTES NFR BLD AUTO: 24 % (ref 14–44)
MCH RBC QN AUTO: 32.1 PG (ref 26.8–34.3)
MCHC RBC AUTO-ENTMCNC: 32.1 G/DL (ref 31.4–37.4)
MCV RBC AUTO: 100 FL (ref 82–98)
MONOCYTES # BLD AUTO: 0.65 THOUSAND/ÂΜL (ref 0.17–1.22)
MONOCYTES NFR BLD AUTO: 10 % (ref 4–12)
NEUTROPHILS # BLD AUTO: 4.07 THOUSANDS/ÂΜL (ref 1.85–7.62)
NEUTS SEG NFR BLD AUTO: 65 % (ref 43–75)
NRBC BLD AUTO-RTO: 0 /100 WBCS
PLATELET # BLD AUTO: 223 THOUSANDS/UL (ref 149–390)
PMV BLD AUTO: 12.2 FL (ref 8.9–12.7)
POTASSIUM SERPL-SCNC: 3.7 MMOL/L (ref 3.5–5.3)
PROT SERPL-MCNC: 7 G/DL (ref 6.4–8.4)
RBC # BLD AUTO: 4.24 MILLION/UL (ref 3.81–5.12)
SODIUM SERPL-SCNC: 140 MMOL/L (ref 135–147)
WBC # BLD AUTO: 6.29 THOUSAND/UL (ref 4.31–10.16)

## 2024-02-20 PROCEDURE — 85652 RBC SED RATE AUTOMATED: CPT

## 2024-02-20 PROCEDURE — 86140 C-REACTIVE PROTEIN: CPT

## 2024-02-20 PROCEDURE — 85025 COMPLETE CBC W/AUTO DIFF WBC: CPT

## 2024-02-20 PROCEDURE — 36415 COLL VENOUS BLD VENIPUNCTURE: CPT

## 2024-02-20 PROCEDURE — 80053 COMPREHEN METABOLIC PANEL: CPT

## 2024-03-01 ENCOUNTER — HOSPITAL ENCOUNTER (OUTPATIENT)
Dept: NON INVASIVE DIAGNOSTICS | Facility: HOSPITAL | Age: 74
Discharge: HOME | End: 2024-03-01
Payer: MEDICARE

## 2024-03-01 DIAGNOSIS — R00.2 HEART PALPITATIONS: ICD-10-CM

## 2024-03-01 DIAGNOSIS — I73.9 CLAUDICATION (HCC): ICD-10-CM

## 2024-03-01 DIAGNOSIS — R60.1 GENERALIZED EDEMA: ICD-10-CM

## 2024-03-01 PROCEDURE — 93923 UPR/LXTR ART STDY 3+ LVLS: CPT

## 2024-03-01 PROCEDURE — 93923 UPR/LXTR ART STDY 3+ LVLS: CPT | Performed by: SURGERY

## 2024-04-03 ENCOUNTER — OFFICE VISIT (OUTPATIENT)
Dept: PODIATRY | Facility: CLINIC | Age: 74
End: 2024-04-03
Payer: MEDICARE

## 2024-04-03 ENCOUNTER — APPOINTMENT (OUTPATIENT)
Dept: RADIOLOGY | Facility: CLINIC | Age: 74
End: 2024-04-03
Payer: MEDICARE

## 2024-04-03 VITALS
HEART RATE: 83 BPM | SYSTOLIC BLOOD PRESSURE: 143 MMHG | WEIGHT: 209 LBS | BODY MASS INDEX: 35.87 KG/M2 | DIASTOLIC BLOOD PRESSURE: 83 MMHG

## 2024-04-03 DIAGNOSIS — Q66.229 METATARSUS ADDUCTUS: ICD-10-CM

## 2024-04-03 DIAGNOSIS — M21.619 BUNION: ICD-10-CM

## 2024-04-03 DIAGNOSIS — M79.672 LEFT FOOT PAIN: ICD-10-CM

## 2024-04-03 DIAGNOSIS — M79.672 LEFT FOOT PAIN: Primary | ICD-10-CM

## 2024-04-03 PROCEDURE — 73630 X-RAY EXAM OF FOOT: CPT

## 2024-04-03 PROCEDURE — 99204 OFFICE O/P NEW MOD 45 MIN: CPT | Performed by: PODIATRIST

## 2024-04-03 RX ORDER — CHLORHEXIDINE GLUCONATE 4 G/100ML
SOLUTION TOPICAL DAILY PRN
OUTPATIENT
Start: 2024-04-03

## 2024-04-03 RX ORDER — CHLORHEXIDINE GLUCONATE ORAL RINSE 1.2 MG/ML
15 SOLUTION DENTAL ONCE
OUTPATIENT
Start: 2024-04-03 | End: 2024-04-03

## 2024-04-03 NOTE — PROGRESS NOTES
Assessment/Plan:    No problem-specific Assessment & Plan notes found for this encounter.       Diagnoses and all orders for this visit:    Left foot pain  -     X-ray foot left 3+ views; Future    Metatarsus adductus    Bunion  -     Case request operating room: BUNIONECTOMY LLOYD; Standing    Other orders  -     Nursing Communication Warmimg Interventions Implemented; Standing  -     Nursing Communication CHG bath, have staff wash entire body (neck down) per pre-op bathing protocol. Routine, evening prior to, and day of surgery.; Standing  -     Nursing Communication Swab both nares with Povidone-Iodine solution, EXCLUDE if patient has shellfish/Iodine allergy, and replace with nasal alcohol swabstick. Routine, day of surgery, on call to OR; Standing  -     chlorhexidine (PERIDEX) 0.12 % oral rinse 15 mL  -     Void on call to OR; Standing  -     Insert peripheral IV; Standing  -     Diet NPO; Sips with meds; Standing  -     chlorhexidine (HIBICLENS) 4 % topical liquid  -     ceFAZolin (ANCEF) 2,000 mg in dextrose 5 % 100 mL IVPB      -X-rays 3 views weightbearing taken reviewed of the left foot and do show significant metatarsus adductus with arthritic change of the first MTPJ, minimal dorsal spurring, but skew foot orientation of the foot with metatarsus adductus midfoot arthritis pes planus  - She has failed conservative treatment and we will plan for Lloyd bunionectomy  - She will be weight-bear as tolerated surgical shoe for 6 weeks, I anticipate appointments at 1 week postop for dressing change, 2-week postop for suture removal and 6 weeks postop for x-rays and transition back into shoe  - We did discuss recurrence, we discussed floppy toe    Patient was counseled and educated on the condition and the diagnosis. The diagnosis, treatment options and prognosis were discussed with the patient.  The patient failed conservative care at this time and wished to proceed with surgical treatment.  Explained surgical  details and post-op course.  Discussed all risks and complications related to the patient's condition and surgery.  The benefits of surgery were also discussed.  The patient understood that the surgery would not guarantee desirable outcome.  All questions and concerns were addressed and the consent was signed.        Subjective:      Patient ID: Raquel Wright is a 73 y.o. female.    Patient presents for evaluation management of her left foot, complaining of bunion pain, she has had multiple steroid injections in the joint by previous podiatrist.  But she is having difficulty wearing shoes and she has tried altering her shoe and she is still having active tibias daily living altered due to her significant pain        The following portions of the patient's history were reviewed and updated as appropriate: allergies, current medications, past family history, past medical history, past social history, past surgical history, and problem list.    Review of Systems   Constitutional:  Negative for chills and fever.   HENT:  Negative for ear pain and sore throat.    Eyes:  Negative for pain and visual disturbance.   Respiratory:  Negative for cough and shortness of breath.    Cardiovascular:  Negative for chest pain and palpitations.   Gastrointestinal:  Negative for abdominal pain and vomiting.   Genitourinary:  Negative for dysuria and hematuria.   Musculoskeletal:  Negative for arthralgias and back pain.   Skin:  Negative for color change and rash.   Neurological:  Negative for seizures and syncope.   All other systems reviewed and are negative.        Objective:      /83 (BP Location: Right arm, Patient Position: Sitting, Cuff Size: Large)   Pulse 83   Wt 94.8 kg (209 lb)   BMI 35.87 kg/m²          Physical Exam  Musculoskeletal:      Comments: DP PT pulses are palpable, slight crepitus with joint, there is significant metatarsus adductus of the left foot with skew foot, there is distal migration of the distal  Happy.  There is pain on palpation of the dorsomedial eminence, no pain in range of motion, eyes closed significant imitation of motion left first MTPJ.    Pulses are palpable with positive varicosities

## 2024-05-07 ENCOUNTER — APPOINTMENT (OUTPATIENT)
Dept: RADIOLOGY | Facility: MEDICAL CENTER | Age: 74
End: 2024-05-07
Payer: MEDICARE

## 2024-05-07 ENCOUNTER — OFFICE VISIT (OUTPATIENT)
Dept: OBGYN CLINIC | Facility: CLINIC | Age: 74
End: 2024-05-07
Payer: MEDICARE

## 2024-05-07 VITALS
HEIGHT: 64 IN | BODY MASS INDEX: 35.58 KG/M2 | WEIGHT: 208.4 LBS | RESPIRATION RATE: 16 BRPM | DIASTOLIC BLOOD PRESSURE: 80 MMHG | HEART RATE: 87 BPM | SYSTOLIC BLOOD PRESSURE: 125 MMHG

## 2024-05-07 DIAGNOSIS — M17.12 PRIMARY OSTEOARTHRITIS OF LEFT KNEE: ICD-10-CM

## 2024-05-07 DIAGNOSIS — M25.561 RIGHT KNEE PAIN, UNSPECIFIED CHRONICITY: Primary | ICD-10-CM

## 2024-05-07 DIAGNOSIS — M25.561 RIGHT KNEE PAIN, UNSPECIFIED CHRONICITY: ICD-10-CM

## 2024-05-07 DIAGNOSIS — M17.11 PRIMARY OSTEOARTHRITIS OF RIGHT KNEE: ICD-10-CM

## 2024-05-07 PROCEDURE — 73564 X-RAY EXAM KNEE 4 OR MORE: CPT

## 2024-05-07 PROCEDURE — 20610 DRAIN/INJ JOINT/BURSA W/O US: CPT | Performed by: STUDENT IN AN ORGANIZED HEALTH CARE EDUCATION/TRAINING PROGRAM

## 2024-05-07 PROCEDURE — 99214 OFFICE O/P EST MOD 30 MIN: CPT | Performed by: STUDENT IN AN ORGANIZED HEALTH CARE EDUCATION/TRAINING PROGRAM

## 2024-05-07 RX ORDER — BUPIVACAINE HYDROCHLORIDE 2.5 MG/ML
4 INJECTION, SOLUTION INFILTRATION; PERINEURAL
Status: COMPLETED | OUTPATIENT
Start: 2024-05-07 | End: 2024-05-07

## 2024-05-07 RX ORDER — TRIAMCINOLONE ACETONIDE 40 MG/ML
80 INJECTION, SUSPENSION INTRA-ARTICULAR; INTRAMUSCULAR
Status: COMPLETED | OUTPATIENT
Start: 2024-05-07 | End: 2024-05-07

## 2024-05-07 RX ADMIN — BUPIVACAINE HYDROCHLORIDE 4 ML: 2.5 INJECTION, SOLUTION INFILTRATION; PERINEURAL at 14:45

## 2024-05-07 RX ADMIN — TRIAMCINOLONE ACETONIDE 80 MG: 40 INJECTION, SUSPENSION INTRA-ARTICULAR; INTRAMUSCULAR at 14:45

## 2024-05-07 NOTE — PROGRESS NOTES
Orthopedic Surgery Office Note  Raquel Wright (73 y.o. female)   : 1950   MRN: [id number]   Encounter Date: 2024 with Dr. Edgar Romero, DO  Chief Complaint   Patient presents with   • Left Knee - Follow-up   • Right Knee - Follow-up       Assessment, Plan, & Discussion:     Osteoarthritis of Bilateral Knees  Discussed with the patient that:  - she has bilateral osteoarthritis of bilateral knees  - Discussed with patient that arthritis is a chronic, incurable, irreversible disease and that management would be focused on alleviating symptoms, as it is not possible to reverse or remove the degenerative changes. Discussed treatment options to include symptom masking with voltaren gel and OTC pain relievers, muscle strengthening with PT to have the joint rely on strong muscle rather than bad bone, and possible consideration of corticosteroid or visco supplementation injections in the future. Discussed definitive treatment as total joint replacement, which would be an elective, pain-relieving procedure, and that symptoms and radiographs may not align, leaving it to the patient to determine when the symptoms would warrant the surgery.   - patient was offered and accepted CS injections to bilateral knees, patient tolerated well  - recommended ice and tylenol as necessary for next 24-48 hours for soreness  - patient will continue to follow with Rheumatology and see if medication switch also helps with back and hip pain  - patient will follow-up as needed  - The patient expresses understanding and is in agreement with today's treatment plan.       Plan:   Return if symptoms worsen or fail to improve.    Surgery:   No surgery planned at this time      Orders:     Diagnoses and all orders for this visit:    Right knee pain, unspecified chronicity  -     XR knee 4+ vw right injury; Future    Primary osteoarthritis of left knee  -     XR knee 4+ vw left injury; Future  -     Large joint arthrocentesis: L  "knee    Primary osteoarthritis of right knee  -     Large joint arthrocentesis: R knee         History of Present Illness:     Raquel Wright is a 73 y.o. female who presents for consultation at the request of Muriel Lee DO regarding bilateral knee pain. She states that she has recently been experiencing increased pain for the past 2 months. She previously had a CS injection to the left knee in 9/2023 that she states has relieved her symptoms completely until recently. She states that she believes the flare up has to do with her rheumatology medication that he believes is not working as well as it did previously. She is scheduled to follow with her rheumatologist within the next two weeks.    Review of Systems  Constitutional: Negative for fatigue, fever or loss of appetite.   HENT: Negative.    Respiratory: Negative for shortness of breath, dyspnea.    Cardiovascular: Negative for chest pain/tightness.   Gastrointestinal: Negative for abdominal pain, N/V.   Endocrine: Negative for cold/heat intolerance, unexplained weight loss/gain.   Genitourinary: Negative for flank pain, dysuria  Skin: Negative for rash.    Psychiatric/Behavioral: Negative for agitation.  All else negative unless otherwise noted in HPI    Physical Exam:   General:  /80   Pulse 87   Resp 16   Ht 5' 4\" (1.626 m)   Wt 94.5 kg (208 lb 6.4 oz)   BMI 35.77 kg/m²   Cons: Appears well.  No apparent distress.  Psych: Alert. Oriented x3.  Mood and affect normal.  Eyes: PERRLA, EOMI  Resp: Normal effort.  No audible wheezing or stridor.  CV: Extremities warm and well perfused.   Abd:    No distention or guarding.   Skin: Warm. No visible lesions.  Neuro: Normal muscle tone.      Orthopedic Exam:   bilateral knee(s) -   Patient ambulates with steady gait pattern  Uses No assistive device  No anatomical deformity  Skin is warm and dry to touch with no signs of erythema, ecchymosis, or infection  Mild generalized soft tissue swelling or " effusion noted  ROM (0° - 115°)  MMT: 5/5 throughout  TTP over medial joint line, TTP over lateral joint line  Flexor and extensor mechanisms are intact   Knee is stable to varus and valgus stress  - Lachman's  - Anterior Drawer, - Posterior Drawer  - Gloria's  Patella tracks centrally without palpable crepitus  Calf compartments are soft and supple  - Tera's sign  2+ DP and PT pulses with brisk capillary refill to the toes  Sural, saphenous, tibial, superficial, and deep peroneal motor and sensory distributions intact  Sensation light touch intact distally        Imaging/Studies:     Study: XR right knee  Date: 5/7/24  Report: No radiologist report was available at this time.   I have personally reviewed imaging and my impression is as follows: Tricompartmental osteoarthritis as evidenced by joint space narrowing, subchondral sclerosis, and osteophytic changes  No acute osseous abnormality, no noted fracture, dislocation or suspicious lesions    Study: XR left knee  Date: 5/7/24  Report: No radiologist report was available at this time.   I have personally reviewed imaging and my impression is as follows:   Tricompartmental osteoarthritis as evidenced by joint space narrowing, subchondral sclerosis, and osteophytic changes  No acute osseous abnormality, no noted fracture, dislocation or suspicious lesions    Study: XR left knee  Date: 8/23/23  Report: I have read and agree with the radiologist report.  I have personally reviewed imaging and my impression is as follows:  FINDINGS:     There is no acute fracture or dislocation.     There is no joint effusion.     There is moderate medial and patellofemoral compartment joint space narrowing. There are small marginal osteophytes at the medial, lateral and patellofemoral compartments.     No lytic or blastic osseous lesion.     Soft tissues are unremarkable.     IMPRESSION:     No acute osseous abnormality.     Degenerative changes as described.          Large joint  arthrocentesis: R knee  Universal Protocol:  Consent: Verbal consent obtained.  Risks and benefits: risks, benefits and alternatives were discussed  Consent given by: patient  Timeout called at: 5/7/2024 3:40 PM.  Patient understanding: patient states understanding of the procedure being performed  Site marked: the operative site was marked  Radiology Images displayed and confirmed. If images not available, report reviewed: imaging studies available  Patient identity confirmed: verbally with patient  Supporting Documentation  Indications: pain and joint swelling   Procedure Details  Location: knee - R knee  Needle gauge: 21 G.  Ultrasound guidance: no  Approach: anteromedial  Medications administered: 80 mg triamcinolone acetonide 40 mg/mL; 4 mL bupivacaine 0.25 %    Patient tolerance: patient tolerated the procedure well with no immediate complications  Dressing:  Sterile dressing applied      Large joint arthrocentesis: L knee  Universal Protocol:  Consent: Verbal consent obtained.  Risks and benefits: risks, benefits and alternatives were discussed  Consent given by: patient  Timeout called at: 5/7/2024 3:41 PM.  Patient understanding: patient states understanding of the procedure being performed  Site marked: the operative site was marked  Radiology Images displayed and confirmed. If images not available, report reviewed: imaging studies available  Patient identity confirmed: verbally with patient  Supporting Documentation  Indications: pain and joint swelling   Procedure Details  Location: knee - L knee  Needle gauge: 21 G.  Ultrasound guidance: no  Approach: anteromedial  Medications administered: 4 mL bupivacaine 0.25 %; 80 mg triamcinolone acetonide 40 mg/mL    Patient tolerance: patient tolerated the procedure well with no immediate complications  Dressing:  Sterile dressing applied            Medical, Surgical, Family, and Social History    The patient's medical history, family history, and social history,  were reviewed and updated as appropriate.    Past Medical History:   Diagnosis Date   • Encephalitis    • History of blood clot in brain    • Rheumatic disease    • Rheumatoid arthritis (HCC)      Past Surgical History:   Procedure Laterality Date   • CHOLECYSTECTOMY     • CYST REMOVAL Right     wrist   • HAND SURGERY  1990   • HEMORROIDECTOMY     • WRIST SURGERY  1990     No family history on file.  Social History     Occupational History   • Not on file   Tobacco Use   • Smoking status: Former   • Smokeless tobacco: Never   • Tobacco comments:     Only smoked when she was a teenager   Vaping Use   • Vaping status: Never Used   Substance and Sexual Activity   • Alcohol use: Never   • Drug use: Never   • Sexual activity: Not Currently     Allergies   Allergen Reactions   • Percocet [Oxycodone-Acetaminophen] GI Intolerance   • Sulfa Antibiotics      Uncertain allergy       Current Outpatient Medications:   •  Etanercept (Enbrel Mini) 50 MG/ML SOCT, , Disp: , Rfl:   •  folic acid (FOLVITE) 1 mg tablet, Take by mouth daily, Disp: , Rfl:   •  LUMIGAN 0.01 % ophthalmic drops, , Disp: , Rfl:   •  methotrexate 2.5 mg tablet, , Disp: , Rfl:   •  potassium chloride (Klor-Con M20) 20 mEq tablet, Take 1 tablet (20 mEq total) by mouth daily, Disp: 90 tablet, Rfl: 3  •  predniSONE 2.5 mg tablet, , Disp: , Rfl:   •  Xarelto 10 MG tablet, Take 10 mg by mouth daily, Disp: , Rfl:   •  ciprofloxacin (CIPRO) 500 mg tablet, Take 500 mg by mouth 2 (two) times a day (Patient not taking: Reported on 4/3/2024), Disp: , Rfl:   •  Lasix 20 MG tablet, 40 mg, Disp: , Rfl:       This Visit:     25 minutes was spent in the coordination of care, reviewing of imaging and with the patient on the date of service        Scribe Attestation    I,:  Alma Rosa Perez am acting as a scribe while in the presence of the attending physician.:       I,:  Edgar Romero DO personally performed the services described in this documentation    as scribed in my  presence.:           Dr. Edgar Romero DO, Orthopedic Surgeon  [facility]

## 2024-05-07 NOTE — PROGRESS NOTES
"Orthopedic Surgery Office Note  Raquel Wright (73 y.o. female)   : 1950   MRN: [id number]   Encounter Date: 2024 with Dr. Edgar Romero,   Chief Complaint   Patient presents with    Left Knee - Follow-up    Right Knee - Follow-up       Assessment, Plan, & Discussion:     ***  - ***    Plan:   No follow-ups on file.    Surgery:   { surgery dot phrases:07597}      Orders:     Diagnoses and all orders for this visit:    Right knee pain, unspecified chronicity    Primary osteoarthritis of left knee         History of Present Illness:     Raquel Wright is a 73 y.o. female who presents { visit reasonin} regarding ***    Review of Systems  Constitutional: Negative for fatigue, fever or loss of appetite.   HENT: Negative.    Respiratory: Negative for shortness of breath, dyspnea.    Cardiovascular: Negative for chest pain/tightness.   Gastrointestinal: Negative for abdominal pain, N/V.   Endocrine: Negative for cold/heat intolerance, unexplained weight loss/gain.   Genitourinary: Negative for flank pain, dysuria  Skin: Negative for rash.    Psychiatric/Behavioral: Negative for agitation.  All else negative unless otherwise noted in HPI    Physical Exam:   General:  /80   Pulse 87   Resp 16   Ht 5' 4\" (1.626 m)   Wt 94.5 kg (208 lb 6.4 oz)   BMI 35.77 kg/m²   Cons: Appears well.  No apparent distress.  Psych: Alert. Oriented x3.  Mood and affect normal.  Eyes: PERRLA, EOMI  Resp: Normal effort.  No audible wheezing or stridor.  CV: Extremities warm and well perfused.   Abd:    No distention or guarding.   Skin: Warm. No visible lesions.  Neuro: Normal muscle tone.      Orthopedic Exam:   { physical exam:56111}      Imaging/Studies:     Study: ***  Date: ***  Report: { radiology:90584}  I have personally reviewed imaging and my impression is as follows: ***    Procedures  {NO PROCDOC:71212}      Medical, Surgical, Family, and Social History    The patient's medical history, family " history, and social history, were reviewed and updated as appropriate.    Past Medical History:   Diagnosis Date    Encephalitis     History of blood clot in brain     Rheumatic disease     Rheumatoid arthritis (HCC)      Past Surgical History:   Procedure Laterality Date    CHOLECYSTECTOMY      CYST REMOVAL Right     wrist    HAND SURGERY  1990    HEMORROIDECTOMY      WRIST SURGERY  1990     No family history on file.  Social History     Occupational History    Not on file   Tobacco Use    Smoking status: Former    Smokeless tobacco: Never    Tobacco comments:     Only smoked when she was a teenager   Vaping Use    Vaping status: Never Used   Substance and Sexual Activity    Alcohol use: Never    Drug use: Never    Sexual activity: Not Currently     Allergies   Allergen Reactions    Percocet [Oxycodone-Acetaminophen] GI Intolerance    Sulfa Antibiotics      Uncertain allergy       Current Outpatient Medications:     Etanercept (Enbrel Mini) 50 MG/ML SOCT, , Disp: , Rfl:     folic acid (FOLVITE) 1 mg tablet, Take by mouth daily, Disp: , Rfl:     LUMIGAN 0.01 % ophthalmic drops, , Disp: , Rfl:     methotrexate 2.5 mg tablet, , Disp: , Rfl:     potassium chloride (Klor-Con M20) 20 mEq tablet, Take 1 tablet (20 mEq total) by mouth daily, Disp: 90 tablet, Rfl: 3    predniSONE 2.5 mg tablet, , Disp: , Rfl:     Xarelto 10 MG tablet, Take 10 mg by mouth daily, Disp: , Rfl:     ciprofloxacin (CIPRO) 500 mg tablet, Take 500 mg by mouth 2 (two) times a day (Patient not taking: Reported on 4/3/2024), Disp: , Rfl:     Lasix 20 MG tablet, 40 mg, Disp: , Rfl:       This Visit:     *** minutes was spent in the coordination of care, reviewing of imaging and with the patient on the date of service    Ronaldo Higgins PA-C    Scribe Attestation      I,:   am acting as a scribe while in the presence of the attending physician.:       I,:   personally performed the services described in this documentation    as scribed in my presence.:              Dr. Edgar Romero DO, Orthopedic Surgeon  [facility]

## 2024-05-20 ENCOUNTER — APPOINTMENT (OUTPATIENT)
Dept: LAB | Facility: CLINIC | Age: 74
End: 2024-05-20
Payer: MEDICARE

## 2024-05-20 DIAGNOSIS — M06.4 INFLAMMATORY POLYARTHROPATHY (HCC): ICD-10-CM

## 2024-05-20 LAB
ALBUMIN SERPL BCP-MCNC: 4 G/DL (ref 3.5–5)
ALP SERPL-CCNC: 98 U/L (ref 34–104)
ALT SERPL W P-5'-P-CCNC: 36 U/L (ref 7–52)
ANION GAP SERPL CALCULATED.3IONS-SCNC: 7 MMOL/L (ref 4–13)
AST SERPL W P-5'-P-CCNC: 23 U/L (ref 13–39)
BASOPHILS # BLD AUTO: 0.02 THOUSANDS/ÂΜL (ref 0–0.1)
BASOPHILS NFR BLD AUTO: 0 % (ref 0–1)
BILIRUB SERPL-MCNC: 0.76 MG/DL (ref 0.2–1)
BUN SERPL-MCNC: 23 MG/DL (ref 5–25)
CALCIUM SERPL-MCNC: 9.5 MG/DL (ref 8.4–10.2)
CHLORIDE SERPL-SCNC: 104 MMOL/L (ref 96–108)
CO2 SERPL-SCNC: 29 MMOL/L (ref 21–32)
CREAT SERPL-MCNC: 0.73 MG/DL (ref 0.6–1.3)
CRP SERPL QL: 1.5 MG/L
EOSINOPHIL # BLD AUTO: 0.04 THOUSAND/ÂΜL (ref 0–0.61)
EOSINOPHIL NFR BLD AUTO: 1 % (ref 0–6)
ERYTHROCYTE [DISTWIDTH] IN BLOOD BY AUTOMATED COUNT: 13.8 % (ref 11.6–15.1)
ERYTHROCYTE [SEDIMENTATION RATE] IN BLOOD: 41 MM/HOUR (ref 0–29)
GFR SERPL CREATININE-BSD FRML MDRD: 83 ML/MIN/1.73SQ M
GLUCOSE P FAST SERPL-MCNC: 103 MG/DL (ref 65–99)
HCT VFR BLD AUTO: 41.2 % (ref 34.8–46.1)
HGB BLD-MCNC: 13.5 G/DL (ref 11.5–15.4)
IMM GRANULOCYTES # BLD AUTO: 0.02 THOUSAND/UL (ref 0–0.2)
IMM GRANULOCYTES NFR BLD AUTO: 0 % (ref 0–2)
LYMPHOCYTES # BLD AUTO: 0.94 THOUSANDS/ÂΜL (ref 0.6–4.47)
LYMPHOCYTES NFR BLD AUTO: 13 % (ref 14–44)
MCH RBC QN AUTO: 32.5 PG (ref 26.8–34.3)
MCHC RBC AUTO-ENTMCNC: 32.8 G/DL (ref 31.4–37.4)
MCV RBC AUTO: 99 FL (ref 82–98)
MONOCYTES # BLD AUTO: 0.59 THOUSAND/ÂΜL (ref 0.17–1.22)
MONOCYTES NFR BLD AUTO: 8 % (ref 4–12)
NEUTROPHILS # BLD AUTO: 5.8 THOUSANDS/ÂΜL (ref 1.85–7.62)
NEUTS SEG NFR BLD AUTO: 78 % (ref 43–75)
NRBC BLD AUTO-RTO: 0 /100 WBCS
PLATELET # BLD AUTO: 226 THOUSANDS/UL (ref 149–390)
PMV BLD AUTO: 11.4 FL (ref 8.9–12.7)
POTASSIUM SERPL-SCNC: 4.1 MMOL/L (ref 3.5–5.3)
PROT SERPL-MCNC: 7 G/DL (ref 6.4–8.4)
RBC # BLD AUTO: 4.15 MILLION/UL (ref 3.81–5.12)
SODIUM SERPL-SCNC: 140 MMOL/L (ref 135–147)
WBC # BLD AUTO: 7.41 THOUSAND/UL (ref 4.31–10.16)

## 2024-05-20 PROCEDURE — 85652 RBC SED RATE AUTOMATED: CPT

## 2024-05-20 PROCEDURE — 36415 COLL VENOUS BLD VENIPUNCTURE: CPT

## 2024-05-20 PROCEDURE — 80053 COMPREHEN METABOLIC PANEL: CPT

## 2024-05-20 PROCEDURE — 86140 C-REACTIVE PROTEIN: CPT

## 2024-05-20 PROCEDURE — 85025 COMPLETE CBC W/AUTO DIFF WBC: CPT

## 2024-05-28 NOTE — PROGRESS NOTES
Cardiology Follow Up    Raquel Wright  1950  [id number]  [facility] [address] [id number] [phone]    1. Lower extremity edema        2. Claudication (HCC)        3. History of pulmonary embolism            Discussion/Summary:    B/L LE edema  Initial consult with Dr Maier in February 2024 for B/L LE x several years  Echo from November 2023: EF 65%; grade 1 DD; RV normal; mild TR  He felt this was multifactorial but likely mostly due to chronic steroid use and obvious varicose veins  Lasix 40 mg QD was continued and she is to take 80 mg twice a week  Today based on my discussion with her it further confirms that her lower extremity edema is likely related to varicose veins/venous insufficiency; she states that her diuretics do keep the swelling from worsening----Notes edema is at least moderately improved in the morning after legs have been elevated  On exam today she has +1 bilateral lower extremity edema confined to the area of the ankles  We reviewed her echocardiogram and based on physical exam I do not think any of her lower extremity edema is related to congestive heart failure I think this is probably chronic steroid use but also very likely secondary to varicose veins  I have recommended she elevate her legs when able, follow a low-sodium diet and consider compression stocking use which she states she does typically use  I have asked her to review with her PCP and if PCP feels vascular surgery evaluation is necessary then that can be pursued  She can follow-up with our office on an as-needed basis    Prior hx of PE  On Xarelto    Left LE claudication  B/L LE HUMBERTO both >1    She can follow-up with cardiology on an as-needed basis      Interval History:   Raquel Wright is a 73 y.o. female With PMH of RA, prior PE, obesity and chronic LE edema returns to the office today for routine  follow up visit.   The patient was initially seen by Dr. Maier in February 2024 for initial consultation secondary to chronic bilateral lower extremity edema that has been present for several years.  He reviewed echocardiogram that was performed in November 2023 that showed preserved BiV function with no major valvular abnormalities.  Arterial duplex did not show any indication of arterial blockage.  Dr. Maier did recommend that the patient increase her Lasix 2 days a week to 80 mg and then she takes 40 mg the remaining days of the week.  She states this has helped her lower extremity edema somewhat but it does continue.  Her creatinine and potassium have been stable on repeat lab work.  Her blood pressure is acceptable today.  She does tell me that this lower extremity edema is often times at least moderately improved by the morning after her legs have been elevated throughout the evening.  She states this will worsen throughout the day.  On exam today she has +1 bilateral lower extremity edema that is mainly confined to the ankles and she does have clear varicose veins that are at least moderately severe.  I did have a discussion with the patient and her description of this lower extremity edema does seem to follow a venous insufficiency pattern.  I have encouraged her to follow a low-sodium diet, elevate her legs when able and wear compression stockings.  On physical exam she does not have any other signs or symptoms of congestive heart failure.  She does have some chronic NAZARIO but states this has been occurring for the past several years and not worsened in severity or frequency.  I have told her that she can follow with her family doctor for venous insufficiency and can follow with cardiology on an as-needed basis.  I told her to discuss with her PCP as it may benefit her to have a vascular consult in the future.    Medical Problems       Problem List       Acute pulmonary embolism (HCC)    Rheumatoid  arthritis of other site, unspecified whether rheumatoid factor present (HCC)    Obesity, morbid (HCC)    Generalized edema    Heart palpitations    Claudication (HCC)        Past Medical History:   Diagnosis Date    Encephalitis     History of blood clot in brain     Rheumatic disease     Rheumatoid arthritis (HCC)      Social History     Socioeconomic History    Marital status: /Civil Union     Spouse name: Not on file    Number of children: Not on file    Years of education: Not on file    Highest education level: Not on file   Occupational History    Not on file   Tobacco Use    Smoking status: Former    Smokeless tobacco: Never    Tobacco comments:     Only smoked when she was a teenager   Vaping Use    Vaping status: Never Used   Substance and Sexual Activity    Alcohol use: Never    Drug use: Never    Sexual activity: Not Currently   Other Topics Concern    Not on file   Social History Narrative    Not on file     Social Determinants of Health     Financial Resource Strain: Not on file   Food Insecurity: Not on file   Transportation Needs: Not on file   Physical Activity: Not on file   Stress: Not on file   Social Connections: Not on file   Intimate Partner Violence: Not on file   Housing Stability: Not on file      No family history on file.  Past Surgical History:   Procedure Laterality Date    CHOLECYSTECTOMY      CYST REMOVAL Right     wrist    HAND SURGERY  1990    HEMORROIDECTOMY      WRIST SURGERY  1990       Current Outpatient Medications:     ciprofloxacin (CIPRO) 500 mg tablet, Take 500 mg by mouth 2 (two) times a day (Patient not taking: Reported on 4/3/2024), Disp: , Rfl:     Etanercept (Enbrel Mini) 50 MG/ML SOCT, , Disp: , Rfl:     folic acid (FOLVITE) 1 mg tablet, Take by mouth daily, Disp: , Rfl:     Lasix 20 MG tablet, 40 mg, Disp: , Rfl:     LUMIGAN 0.01 % ophthalmic drops, , Disp: , Rfl:     methotrexate 2.5 mg tablet, , Disp: , Rfl:     potassium chloride (Klor-Con M20) 20 mEq  "tablet, Take 1 tablet (20 mEq total) by mouth daily, Disp: 90 tablet, Rfl: 3    predniSONE 2.5 mg tablet, , Disp: , Rfl:     Xarelto 10 MG tablet, Take 10 mg by mouth daily, Disp: , Rfl:   Allergies   Allergen Reactions    Percocet [Oxycodone-Acetaminophen] GI Intolerance    Sulfa Antibiotics      Uncertain allergy       Labs:     Chemistry        Component Value Date/Time    K 4.1 05/20/2024 0843     05/20/2024 0843    CO2 29 05/20/2024 0843    BUN 23 05/20/2024 0843    CREATININE 0.73 05/20/2024 0843        Component Value Date/Time    CALCIUM 9.5 05/20/2024 0843    ALKPHOS 98 05/20/2024 0843    AST 23 05/20/2024 0843    ALT 36 05/20/2024 0843            No results found for: \"CHOL\"  Lab Results   Component Value Date    HDL 79 04/10/2023    HDL 78 05/04/2022    HDL 70 10/12/2021     Lab Results   Component Value Date    LDLCALC 83 04/10/2023    LDLCALC 85 05/04/2022    LDLCALC 89 10/12/2021     Lab Results   Component Value Date    TRIG 66 04/10/2023    TRIG 82 05/04/2022    TRIG 72 10/12/2021     No results found for: \"CHOLHDL\"    Imaging: XR knee 4+ vw right injury    Result Date: 5/7/2024  Narrative: RIGHT KNEE INDICATION:   Pain in right knee.   COMPARISON:  None. VIEWS:  XR KNEE 4+ VW RIGHT INJURY FINDINGS: There is no acute fracture or dislocation. There is a small joint effusion. No significant degenerative changes. No lytic or blastic osseous lesion. Soft tissues are unremarkable.     Impression: No acute osseous abnormality. Small suprapatellar effusion Electronically signed: 05/07/2024 05:55 PM Tristan Romero MD    XR knee 4+ vw left injury    Result Date: 5/7/2024  Narrative: LEFT KNEE INDICATION:   Unilateral primary osteoarthritis, left knee. COMPARISON:  None. VIEWS:  XR KNEE 4+ VW LEFT INJURY FINDINGS: There is no acute fracture or dislocation. There is a moderate joint effusion. Moderate medial compartment degenerative narrowing. Minimal marginal osteophyte medial tibial plateau and femoral " epicondyle. Minimal varus angulation secondarily. Small patellar enthesophyte superiorly. Patellofemoral space preserved. No lytic or blastic osseous lesion. Soft tissues are unremarkable.     Impression: Moderate medial compartment degenerative changes as above. Minimal varus angulation secondarily. Suprapatellar effusion. Mild patellar enthesopathy Electronically signed: 05/07/2024 05:53 PM Tristan Romero MD        Review of Systems   Constitutional: Negative for chills, fever and malaise/fatigue.   HENT:  Negative for congestion.    Cardiovascular:  Positive for dyspnea on exertion (chronic) and leg swelling. Negative for chest pain, orthopnea and palpitations.   Respiratory:  Negative for cough, shortness of breath (no SOB at rest) and wheezing.    Endocrine: Negative.    Hematologic/Lymphatic: Negative.    Skin: Negative.    Musculoskeletal: Negative.    Gastrointestinal:  Negative for bloating, abdominal pain, nausea and vomiting.   Genitourinary: Negative.    Neurological:  Negative for dizziness and light-headedness.   Psychiatric/Behavioral: Negative.     All other systems reviewed and are negative.      There were no vitals filed for this visit.  There were no vitals filed for this visit.      There is no height or weight on file to calculate BMI.    Physical Exam:  Physical Exam  Vitals reviewed.   Constitutional:       General: She is not in acute distress.     Appearance: She is obese.   HENT:      Head: Normocephalic and atraumatic.      Mouth/Throat:      Mouth: Mucous membranes are moist.   Cardiovascular:      Rate and Rhythm: Normal rate and regular rhythm.      Heart sounds: Normal heart sounds, S1 normal and S2 normal. No murmur heard.  Pulmonary:      Effort: Pulmonary effort is normal. No respiratory distress.      Breath sounds: Normal breath sounds.   Abdominal:      General: Bowel sounds are normal. There is no distension.      Palpations: Abdomen is soft.   Musculoskeletal:          General: Normal range of motion.      Cervical back: Normal range of motion and neck supple.      Right lower le+ Pitting Edema present.      Left lower le+ Pitting Edema present.   Skin:     General: Skin is warm and dry.   Neurological:      Mental Status: She is alert and oriented to person, place, and time.   Psychiatric:         Mood and Affect: Mood normal.

## 2024-06-06 ENCOUNTER — OFFICE VISIT (OUTPATIENT)
Dept: CARDIOLOGY CLINIC | Facility: HOSPITAL | Age: 74
End: 2024-06-06
Payer: MEDICARE

## 2024-06-06 VITALS
WEIGHT: 209.2 LBS | OXYGEN SATURATION: 99 % | HEART RATE: 79 BPM | SYSTOLIC BLOOD PRESSURE: 136 MMHG | DIASTOLIC BLOOD PRESSURE: 70 MMHG | BODY MASS INDEX: 35.71 KG/M2 | HEIGHT: 64 IN

## 2024-06-06 DIAGNOSIS — I73.9 CLAUDICATION (HCC): ICD-10-CM

## 2024-06-06 DIAGNOSIS — R60.0 LOWER EXTREMITY EDEMA: Primary | ICD-10-CM

## 2024-06-06 DIAGNOSIS — Z86.711 HISTORY OF PULMONARY EMBOLISM: ICD-10-CM

## 2024-06-06 PROCEDURE — 99214 OFFICE O/P EST MOD 30 MIN: CPT | Performed by: NURSE PRACTITIONER

## 2024-09-13 ENCOUNTER — OFFICE VISIT (OUTPATIENT)
Dept: PODIATRY | Facility: CLINIC | Age: 74
End: 2024-09-13
Payer: MEDICARE

## 2024-09-13 VITALS
DIASTOLIC BLOOD PRESSURE: 84 MMHG | RESPIRATION RATE: 15 BRPM | HEART RATE: 93 BPM | HEIGHT: 64 IN | TEMPERATURE: 97.3 F | WEIGHT: 215 LBS | BODY MASS INDEX: 36.7 KG/M2 | SYSTOLIC BLOOD PRESSURE: 138 MMHG | OXYGEN SATURATION: 98 %

## 2024-09-13 DIAGNOSIS — M79.671 PAIN IN BOTH FEET: Primary | ICD-10-CM

## 2024-09-13 DIAGNOSIS — M79.672 PAIN IN BOTH FEET: Primary | ICD-10-CM

## 2024-09-13 DIAGNOSIS — Z79.01 LONG TERM CURRENT USE OF ANTICOAGULANT: ICD-10-CM

## 2024-09-13 DIAGNOSIS — M19.90 ARTHRITIS: ICD-10-CM

## 2024-09-13 DIAGNOSIS — M21.619 BUNION: ICD-10-CM

## 2024-09-13 PROCEDURE — 99212 OFFICE O/P EST SF 10 MIN: CPT | Performed by: PODIATRIST

## 2024-09-13 PROCEDURE — 20552 NJX 1/MLT TRIGGER POINT 1/2: CPT | Performed by: PODIATRIST

## 2024-09-13 RX ORDER — LIDOCAINE HYDROCHLORIDE 20 MG/ML
2.5 INJECTION, SOLUTION INFILTRATION; PERINEURAL
Status: SHIPPED | OUTPATIENT
Start: 2024-09-13

## 2024-09-13 RX ORDER — TRIAMCINOLONE ACETONIDE 40 MG/ML
0.5 INJECTION, SUSPENSION INTRA-ARTICULAR; INTRAMUSCULAR
Status: SHIPPED | OUTPATIENT
Start: 2024-09-13

## 2024-09-13 RX ORDER — DEXAMETHASONE SODIUM PHOSPHATE 10 MG/ML
0.5 INJECTION, SOLUTION INTRAMUSCULAR; INTRAVENOUS
Status: SHIPPED | OUTPATIENT
Start: 2024-09-13

## 2024-09-13 RX ADMIN — TRIAMCINOLONE ACETONIDE 0.5 ML: 40 INJECTION, SUSPENSION INTRA-ARTICULAR; INTRAMUSCULAR at 08:00

## 2024-09-13 RX ADMIN — LIDOCAINE HYDROCHLORIDE 2.5 ML: 20 INJECTION, SOLUTION INFILTRATION; PERINEURAL at 08:00

## 2024-09-13 RX ADMIN — DEXAMETHASONE SODIUM PHOSPHATE 0.5 ML: 10 INJECTION, SOLUTION INTRAMUSCULAR; INTRAVENOUS at 08:00

## 2024-09-13 NOTE — PROGRESS NOTES
Ambulatory Visit  Name: Raquel Wright      : 1950      MRN: [id number]  Encounter Provider: Nahid Gnozáles DPM  Encounter Date: 2024   Encounter department: [facility] [place]    Assessment & Plan  Pain in both feet       Patient is to purchase a pair of very good over-the-counter orthotics to place in her shoes.  The type that was recommended to her was [employer] orthotics which can be purchased on [employer] and it was the RN style that was recommended to her.  She was also told she can purchase other companies at either [employer] or similar [employer].  It was recommended that she take arthritis strength Tylenol twice a day for the pain in both of her feet and if she is not having any pain then she does not need to be taking the medication.  Bunion         Arthritis          Universal Protocol:  Consent: Verbal consent obtained.  Consent given by: patient  Patient understanding: patient states understanding of the procedure being performed  Patient identity confirmed: verbally with patient  Supporting Documentation  Indications: pain   Trigger Point Injections: single/multiple trigger point(s): 1-2 muscle groups   Procedure Details  Location(s):  Needle size: 25 G  Medications: 0.5 mL triamcinolone acetonide 40 mg/mL; 0.5 mL dexamethasone 100 mg/10 mL; 2.5 mL lidocaine 2 %     Return if symptoms worsen or fail to improve.     History of Present Illness     Raquel Wright is a 74 y.o. female who presents with pain in both feet but the greatest pain is present in her left bunion area.  She admits to having multiple sites of arthritis    History obtained from : patient  Review of Systems  Medical History Reviewed by provider this encounter:       Current Outpatient Medications on File Prior to Visit   Medication Sig Dispense Refill    Etanercept (Enbrel Mini) 50 MG/ML SOCT once a week Takes on       folic acid (FOLVITE) 1 mg tablet Take by mouth daily      Lasix 20 MG tablet 40  "mg daily      LUMIGAN 0.01 % ophthalmic drops       methotrexate 2.5 mg tablet Takes on Sundays--12.5 mg      potassium chloride (Klor-Con M20) 20 mEq tablet Take 1 tablet (20 mEq total) by mouth daily 90 tablet 3    predniSONE 2.5 mg tablet 5 mg daily      Xarelto 10 MG tablet Take 10 mg by mouth daily       No current facility-administered medications on file prior to visit.      Her previous x-rays from June were reexamined by myself and the show multiple sites of arthritis especially in the first metatarsal head medially and on the dorsal aspect in the midfoot area    Objective     /84   Pulse 93   Temp (!) 97.3 °F (36.3 °C)   Resp 15   Ht 5' 4\" (1.626 m)   Wt 97.5 kg (215 lb)   SpO2 98%   BMI 36.90 kg/m²     Physical Exam  Vascular status is 1/4 DP PT negative digital hair normal distal cooling immediate capillary refill bilaterally.  There is pitting edema present bilaterally especially proximal to the ankle joint.  The pitting is +3 in nature.    Ortho enlargement of the first metatarsal on the left foot with a medial prominence noted.  There is fair range of motion plantarly to the joint dorsiflexion is a little limited no crepitus is heard.  There is arthritis noted at the midfoot region on the right foot.    Derm small amount of erythema is present on the first MPJ on the left foot secondary to pressure.    Neuro 0.5 monofilament test was performed at 4 sites plantar aspect of the hallux, submet 3, plantar aspect of the third and fourth digits and the plantar arch and the patient was able to feel all she also was able to sense light touch    Administrative Statements   I have spent a total time of 15 minutes in caring for this patient on the day of the visit/encounter including Risks and benefits of tx options, Instructions for management, Patient and family education, Counseling / Coordination of care, Documenting in the medical record, Reviewing / ordering tests, medicine, procedures  , and " Obtaining or reviewing history  .

## 2024-11-19 ENCOUNTER — APPOINTMENT (OUTPATIENT)
Dept: LAB | Facility: CLINIC | Age: 74
End: 2024-11-19
Payer: MEDICARE

## 2024-11-19 DIAGNOSIS — M17.2 POST-TRAUMATIC OSTEOARTHRITIS OF BOTH KNEES: ICD-10-CM

## 2024-11-19 DIAGNOSIS — M06.00 SERONEGATIVE RHEUMATOID ARTHRITIS (HCC): ICD-10-CM

## 2024-11-19 DIAGNOSIS — Z51.81 THERAPEUTIC DRUG MONITORING: ICD-10-CM

## 2024-11-19 LAB
ALBUMIN SERPL BCG-MCNC: 3.8 G/DL (ref 3.5–5)
ALP SERPL-CCNC: 94 U/L (ref 34–104)
ALT SERPL W P-5'-P-CCNC: 23 U/L (ref 7–52)
ANION GAP SERPL CALCULATED.3IONS-SCNC: 9 MMOL/L (ref 4–13)
AST SERPL W P-5'-P-CCNC: 23 U/L (ref 13–39)
BASOPHILS # BLD AUTO: 0.02 THOUSANDS/ÂΜL (ref 0–0.1)
BASOPHILS NFR BLD AUTO: 0 % (ref 0–1)
BILIRUB SERPL-MCNC: 0.78 MG/DL (ref 0.2–1)
BUN SERPL-MCNC: 17 MG/DL (ref 5–25)
CALCIUM SERPL-MCNC: 9.4 MG/DL (ref 8.4–10.2)
CHLORIDE SERPL-SCNC: 104 MMOL/L (ref 96–108)
CO2 SERPL-SCNC: 27 MMOL/L (ref 21–32)
CREAT SERPL-MCNC: 0.77 MG/DL (ref 0.6–1.3)
CRP SERPL QL: 5.3 MG/L
EOSINOPHIL # BLD AUTO: 0.06 THOUSAND/ÂΜL (ref 0–0.61)
EOSINOPHIL NFR BLD AUTO: 1 % (ref 0–6)
ERYTHROCYTE [DISTWIDTH] IN BLOOD BY AUTOMATED COUNT: 13.3 % (ref 11.6–15.1)
ERYTHROCYTE [SEDIMENTATION RATE] IN BLOOD: 54 MM/HOUR (ref 0–29)
GFR SERPL CREATININE-BSD FRML MDRD: 77 ML/MIN/1.73SQ M
GLUCOSE P FAST SERPL-MCNC: 84 MG/DL (ref 65–99)
HCT VFR BLD AUTO: 39.9 % (ref 34.8–46.1)
HGB BLD-MCNC: 13.1 G/DL (ref 11.5–15.4)
IMM GRANULOCYTES # BLD AUTO: 0.02 THOUSAND/UL (ref 0–0.2)
IMM GRANULOCYTES NFR BLD AUTO: 0 % (ref 0–2)
LYMPHOCYTES # BLD AUTO: 1.62 THOUSANDS/ÂΜL (ref 0.6–4.47)
LYMPHOCYTES NFR BLD AUTO: 30 % (ref 14–44)
MCH RBC QN AUTO: 32.2 PG (ref 26.8–34.3)
MCHC RBC AUTO-ENTMCNC: 32.8 G/DL (ref 31.4–37.4)
MCV RBC AUTO: 98 FL (ref 82–98)
MONOCYTES # BLD AUTO: 0.57 THOUSAND/ÂΜL (ref 0.17–1.22)
MONOCYTES NFR BLD AUTO: 10 % (ref 4–12)
NEUTROPHILS # BLD AUTO: 3.2 THOUSANDS/ÂΜL (ref 1.85–7.62)
NEUTS SEG NFR BLD AUTO: 59 % (ref 43–75)
NRBC BLD AUTO-RTO: 0 /100 WBCS
PLATELET # BLD AUTO: 209 THOUSANDS/UL (ref 149–390)
PMV BLD AUTO: 11.6 FL (ref 8.9–12.7)
POTASSIUM SERPL-SCNC: 3.6 MMOL/L (ref 3.5–5.3)
PROT SERPL-MCNC: 6.8 G/DL (ref 6.4–8.4)
RBC # BLD AUTO: 4.07 MILLION/UL (ref 3.81–5.12)
SODIUM SERPL-SCNC: 140 MMOL/L (ref 135–147)
WBC # BLD AUTO: 5.49 THOUSAND/UL (ref 4.31–10.16)

## 2024-11-19 PROCEDURE — 86480 TB TEST CELL IMMUN MEASURE: CPT

## 2024-11-19 PROCEDURE — 85025 COMPLETE CBC W/AUTO DIFF WBC: CPT

## 2024-11-19 PROCEDURE — 86140 C-REACTIVE PROTEIN: CPT

## 2024-11-19 PROCEDURE — 80053 COMPREHEN METABOLIC PANEL: CPT

## 2024-11-19 PROCEDURE — 36415 COLL VENOUS BLD VENIPUNCTURE: CPT

## 2024-11-19 PROCEDURE — 85652 RBC SED RATE AUTOMATED: CPT

## 2024-11-20 LAB
GAMMA INTERFERON BACKGROUND BLD IA-ACNC: <0 IU/ML
M TB IFN-G BLD-IMP: NEGATIVE
M TB IFN-G CD4+ BCKGRND COR BLD-ACNC: 0 IU/ML
M TB IFN-G CD4+ BCKGRND COR BLD-ACNC: 0 IU/ML
MITOGEN IGNF BCKGRD COR BLD-ACNC: 10 IU/ML

## 2025-01-17 ENCOUNTER — APPOINTMENT (OUTPATIENT)
Dept: LAB | Facility: CLINIC | Age: 75
End: 2025-01-17
Payer: MEDICARE

## 2025-01-17 DIAGNOSIS — E03.9 HYPOTHYROIDISM, UNSPECIFIED TYPE: ICD-10-CM

## 2025-01-17 DIAGNOSIS — I26.99 PULMONARY INFARCTION (HCC): ICD-10-CM

## 2025-01-17 DIAGNOSIS — E55.9 VITAMIN D DEFICIENCY: ICD-10-CM

## 2025-01-17 DIAGNOSIS — Z13.9 SCREENING FOR UNSPECIFIED CONDITION: ICD-10-CM

## 2025-01-17 DIAGNOSIS — E78.5 HYPERLIPIDEMIA, UNSPECIFIED HYPERLIPIDEMIA TYPE: ICD-10-CM

## 2025-01-17 LAB
25(OH)D3 SERPL-MCNC: 15.3 NG/ML (ref 30–100)
ALBUMIN SERPL BCG-MCNC: 3.8 G/DL (ref 3.5–5)
ALP SERPL-CCNC: 94 U/L (ref 34–104)
ALT SERPL W P-5'-P-CCNC: 22 U/L (ref 7–52)
ANION GAP SERPL CALCULATED.3IONS-SCNC: 7 MMOL/L (ref 4–13)
AST SERPL W P-5'-P-CCNC: 18 U/L (ref 13–39)
BILIRUB SERPL-MCNC: 0.71 MG/DL (ref 0.2–1)
BUN SERPL-MCNC: 23 MG/DL (ref 5–25)
CALCIUM SERPL-MCNC: 9.4 MG/DL (ref 8.4–10.2)
CHLORIDE SERPL-SCNC: 106 MMOL/L (ref 96–108)
CHOLEST SERPL-MCNC: 172 MG/DL (ref ?–200)
CO2 SERPL-SCNC: 26 MMOL/L (ref 21–32)
CREAT SERPL-MCNC: 0.8 MG/DL (ref 0.6–1.3)
ERYTHROCYTE [DISTWIDTH] IN BLOOD BY AUTOMATED COUNT: 13.4 % (ref 11.6–15.1)
GFR SERPL CREATININE-BSD FRML MDRD: 73 ML/MIN/1.73SQ M
GLUCOSE P FAST SERPL-MCNC: 88 MG/DL (ref 65–99)
HCT VFR BLD AUTO: 39.9 % (ref 34.8–46.1)
HDLC SERPL-MCNC: 68 MG/DL
HGB BLD-MCNC: 13.2 G/DL (ref 11.5–15.4)
LDLC SERPL CALC-MCNC: 91 MG/DL (ref 0–100)
MCH RBC QN AUTO: 32.3 PG (ref 26.8–34.3)
MCHC RBC AUTO-ENTMCNC: 33.1 G/DL (ref 31.4–37.4)
MCV RBC AUTO: 98 FL (ref 82–98)
NONHDLC SERPL-MCNC: 104 MG/DL
PLATELET # BLD AUTO: 205 THOUSANDS/UL (ref 149–390)
PMV BLD AUTO: 11.6 FL (ref 8.9–12.7)
POTASSIUM SERPL-SCNC: 4.2 MMOL/L (ref 3.5–5.3)
PROT SERPL-MCNC: 6.9 G/DL (ref 6.4–8.4)
RBC # BLD AUTO: 4.09 MILLION/UL (ref 3.81–5.12)
SODIUM SERPL-SCNC: 139 MMOL/L (ref 135–147)
TRIGL SERPL-MCNC: 66 MG/DL (ref ?–150)
TSH SERPL DL<=0.05 MIU/L-ACNC: 0.95 UIU/ML (ref 0.45–4.5)
VIT B12 SERPL-MCNC: 301 PG/ML (ref 180–914)
WBC # BLD AUTO: 6.18 THOUSAND/UL (ref 4.31–10.16)

## 2025-01-17 PROCEDURE — 82607 VITAMIN B-12: CPT

## 2025-01-17 PROCEDURE — 80053 COMPREHEN METABOLIC PANEL: CPT

## 2025-01-17 PROCEDURE — 85027 COMPLETE CBC AUTOMATED: CPT

## 2025-01-17 PROCEDURE — 36415 COLL VENOUS BLD VENIPUNCTURE: CPT

## 2025-01-17 PROCEDURE — 80061 LIPID PANEL: CPT

## 2025-01-17 PROCEDURE — 84443 ASSAY THYROID STIM HORMONE: CPT

## 2025-01-17 PROCEDURE — 82306 VITAMIN D 25 HYDROXY: CPT

## 2025-05-13 ENCOUNTER — APPOINTMENT (OUTPATIENT)
Dept: LAB | Facility: CLINIC | Age: 75
End: 2025-05-13
Payer: MEDICARE

## 2025-05-13 DIAGNOSIS — M06.00 SERONEGATIVE RHEUMATOID ARTHRITIS (HCC): ICD-10-CM

## 2025-05-13 DIAGNOSIS — Z51.81 THERAPEUTIC DRUG MONITORING: ICD-10-CM

## 2025-05-13 DIAGNOSIS — M85.89 OSTEOPENIA OF MULTIPLE SITES: ICD-10-CM

## 2025-05-13 LAB
ALBUMIN SERPL BCG-MCNC: 3.8 G/DL (ref 3.5–5)
ALP SERPL-CCNC: 116 U/L (ref 34–104)
ALT SERPL W P-5'-P-CCNC: 28 U/L (ref 7–52)
ANION GAP SERPL CALCULATED.3IONS-SCNC: 6 MMOL/L (ref 4–13)
AST SERPL W P-5'-P-CCNC: 22 U/L (ref 13–39)
BASOPHILS # BLD AUTO: 0.03 THOUSANDS/ÂΜL (ref 0–0.1)
BASOPHILS NFR BLD AUTO: 1 % (ref 0–1)
BILIRUB SERPL-MCNC: 0.87 MG/DL (ref 0.2–1)
BUN SERPL-MCNC: 19 MG/DL (ref 5–25)
CALCIUM SERPL-MCNC: 9.5 MG/DL (ref 8.4–10.2)
CHLORIDE SERPL-SCNC: 106 MMOL/L (ref 96–108)
CK SERPL-CCNC: 22 U/L (ref 26–192)
CO2 SERPL-SCNC: 30 MMOL/L (ref 21–32)
CREAT SERPL-MCNC: 0.73 MG/DL (ref 0.6–1.3)
CRP SERPL QL: 6.2 MG/L
EOSINOPHIL # BLD AUTO: 0.04 THOUSAND/ÂΜL (ref 0–0.61)
EOSINOPHIL NFR BLD AUTO: 1 % (ref 0–6)
ERYTHROCYTE [DISTWIDTH] IN BLOOD BY AUTOMATED COUNT: 13.7 % (ref 11.6–15.1)
ERYTHROCYTE [SEDIMENTATION RATE] IN BLOOD: 39 MM/HOUR (ref 0–29)
GFR SERPL CREATININE-BSD FRML MDRD: 82 ML/MIN/1.73SQ M
GLUCOSE P FAST SERPL-MCNC: 83 MG/DL (ref 65–99)
HCT VFR BLD AUTO: 40.7 % (ref 34.8–46.1)
HGB BLD-MCNC: 13.4 G/DL (ref 11.5–15.4)
IMM GRANULOCYTES # BLD AUTO: 0.01 THOUSAND/UL (ref 0–0.2)
IMM GRANULOCYTES NFR BLD AUTO: 0 % (ref 0–2)
LYMPHOCYTES # BLD AUTO: 1.79 THOUSANDS/ÂΜL (ref 0.6–4.47)
LYMPHOCYTES NFR BLD AUTO: 29 % (ref 14–44)
MCH RBC QN AUTO: 32.5 PG (ref 26.8–34.3)
MCHC RBC AUTO-ENTMCNC: 32.9 G/DL (ref 31.4–37.4)
MCV RBC AUTO: 99 FL (ref 82–98)
MONOCYTES # BLD AUTO: 0.68 THOUSAND/ÂΜL (ref 0.17–1.22)
MONOCYTES NFR BLD AUTO: 11 % (ref 4–12)
NEUTROPHILS # BLD AUTO: 3.58 THOUSANDS/ÂΜL (ref 1.85–7.62)
NEUTS SEG NFR BLD AUTO: 58 % (ref 43–75)
NRBC BLD AUTO-RTO: 0 /100 WBCS
PLATELET # BLD AUTO: 199 THOUSANDS/UL (ref 149–390)
PMV BLD AUTO: 11.9 FL (ref 8.9–12.7)
POTASSIUM SERPL-SCNC: 3.8 MMOL/L (ref 3.5–5.3)
PROT SERPL-MCNC: 6.7 G/DL (ref 6.4–8.4)
RBC # BLD AUTO: 4.12 MILLION/UL (ref 3.81–5.12)
SODIUM SERPL-SCNC: 142 MMOL/L (ref 135–147)
WBC # BLD AUTO: 6.13 THOUSAND/UL (ref 4.31–10.16)

## 2025-05-13 PROCEDURE — 85025 COMPLETE CBC W/AUTO DIFF WBC: CPT

## 2025-05-13 PROCEDURE — 36415 COLL VENOUS BLD VENIPUNCTURE: CPT

## 2025-05-13 PROCEDURE — 86140 C-REACTIVE PROTEIN: CPT

## 2025-05-13 PROCEDURE — 85652 RBC SED RATE AUTOMATED: CPT

## 2025-05-13 PROCEDURE — 80053 COMPREHEN METABOLIC PANEL: CPT

## 2025-05-13 PROCEDURE — 82550 ASSAY OF CK (CPK): CPT

## 2025-06-10 ENCOUNTER — OFFICE VISIT (OUTPATIENT)
Dept: OBGYN CLINIC | Facility: CLINIC | Age: 75
End: 2025-06-10
Payer: MEDICARE

## 2025-06-10 VITALS
BODY MASS INDEX: 36.04 KG/M2 | RESPIRATION RATE: 16 BRPM | OXYGEN SATURATION: 99 % | HEIGHT: 64 IN | HEART RATE: 108 BPM | WEIGHT: 211.1 LBS | TEMPERATURE: 98 F

## 2025-06-10 DIAGNOSIS — M17.11 PRIMARY OSTEOARTHRITIS OF RIGHT KNEE: Primary | ICD-10-CM

## 2025-06-10 PROCEDURE — 99213 OFFICE O/P EST LOW 20 MIN: CPT | Performed by: STUDENT IN AN ORGANIZED HEALTH CARE EDUCATION/TRAINING PROGRAM

## 2025-06-10 PROCEDURE — 20610 DRAIN/INJ JOINT/BURSA W/O US: CPT | Performed by: STUDENT IN AN ORGANIZED HEALTH CARE EDUCATION/TRAINING PROGRAM

## 2025-06-10 RX ORDER — LIDOCAINE HYDROCHLORIDE 10 MG/ML
2 INJECTION, SOLUTION INFILTRATION; PERINEURAL
Status: COMPLETED | OUTPATIENT
Start: 2025-06-10 | End: 2025-06-10

## 2025-06-10 RX ORDER — PREDNISOLONE ACETATE 10 MG/ML
SUSPENSION/ DROPS OPHTHALMIC
COMMUNITY
Start: 2025-04-09

## 2025-06-10 RX ORDER — TRIAMCINOLONE ACETONIDE 40 MG/ML
40 INJECTION, SUSPENSION INTRA-ARTICULAR; INTRAMUSCULAR
Status: COMPLETED | OUTPATIENT
Start: 2025-06-10 | End: 2025-06-10

## 2025-06-10 RX ORDER — DEXLANSOPRAZOLE 60 MG/1
CAPSULE, DELAYED RELEASE ORAL
COMMUNITY

## 2025-06-10 RX ORDER — ERGOCALCIFEROL 1.25 MG/1
1 CAPSULE ORAL WEEKLY
COMMUNITY
Start: 2025-01-20

## 2025-06-10 RX ORDER — APIXABAN 2.5 MG/1
2.5 TABLET, FILM COATED ORAL 2 TIMES DAILY
COMMUNITY

## 2025-06-10 RX ORDER — BUPIVACAINE HYDROCHLORIDE 2.5 MG/ML
2 INJECTION, SOLUTION INFILTRATION; PERINEURAL
Status: COMPLETED | OUTPATIENT
Start: 2025-06-10 | End: 2025-06-10

## 2025-06-10 RX ORDER — SENNOSIDES 8.6 MG
CAPSULE ORAL
COMMUNITY

## 2025-06-10 RX ADMIN — LIDOCAINE HYDROCHLORIDE 2 ML: 10 INJECTION, SOLUTION INFILTRATION; PERINEURAL at 15:45

## 2025-06-10 RX ADMIN — TRIAMCINOLONE ACETONIDE 40 MG: 40 INJECTION, SUSPENSION INTRA-ARTICULAR; INTRAMUSCULAR at 15:45

## 2025-06-10 RX ADMIN — BUPIVACAINE HYDROCHLORIDE 2 ML: 2.5 INJECTION, SOLUTION INFILTRATION; PERINEURAL at 15:45

## 2025-06-10 NOTE — PROGRESS NOTES
Orthopedic Steroid Injection Note  Raquel Wright (74 y.o. female)  : 1950 Encounter Date: 6/10/2025  Dr. Edgar Romero DO, Orthopedic Surgeon  Encounter Department: [facility] [place]  Chief Complaint   Patient presents with    Right Knee - Follow-up       :  Assessment & Plan  Primary osteoarthritis of right knee               74 y.o. female with osteoarthritis of the right knee  Continue with activities as tolerated  Continue with conservative management of right knee  Patient can get corticosteroid injections q 3 months PRN  PT script Deferred by patient  The risks and benefits of the injection (which include but are not limited to: infection, bleeding,damage to nerve/artery, need for further intervention), as well as the risks and benefits of all alternative treatments were explained and understood.  The patient elected to proceed with injection.  The procedure was done with aseptic technique, and the patient tolerated the procedure well with no complications.  A corticosteroid injection was performed.  The patient should take 1-2 days off of activity, with gradual return to activity as tolerated.  Ice to the knee 1-2 times daily for 20 minutes, for next 24-48 hrs.    Follow up:  No follow-ups on file.    History of Present Illness:     74 y.o. female presents to the office for follow up of right knee pain.  They describe their current symptoms as throbbing and uncomfortable. They have tried and failed other conservative measures including previous injections, physical therapy, home exercise. They wish to proceed with a corticosteroid injection.   Patient denies any other acute or associated complaints. Patient denies any symptoms of infection such as fever, chills, or rash.     Prior HPI:  Raquel Wright is a 73 y.o. female who presents for consultation at the request of Muriel Lee DO regarding bilateral knee pain. She states that she has recently been experiencing  "increased pain for the past 2 months. She previously had a CS injection to the left knee in 9/2023 that she states has relieved her symptoms completely until recently. She states that she believes the flare up has to do with her rheumatology medication that he believes is not working as well as it did previously. She is scheduled to follow with her rheumatologist within the next two weeks.     ROS:  Constitutional: Negative for fatigue, fever or loss of appetite.   HENT: Negative.    Respiratory: Negative for shortness of breath, dyspnea.    Cardiovascular: Negative for chest pain/tightness.   Gastrointestinal: Negative for abdominal pain, N/V.   Endocrine: Negative for cold/heat intolerance, unexplained weight loss/gain.   Genitourinary: Negative for flank pain, dysuria  Skin: Negative for rash.    Psychiatric/Behavioral: Negative for agitation.  All else negative unless otherwise noted in HPI    Past Medical History[1]    Physical Exam:   Pulse (!) 108   Temp 98 °F (36.7 °C) (Temporal)   Resp 16   Ht 5' 4\" (1.626 m)   Wt 95.8 kg (211 lb 1.6 oz)   SpO2 99%   BMI 36.24 kg/m²   Estimated body mass index is 36.24 kg/m² as calculated from the following:    Height as of this encounter: 5' 4\" (1.626 m).    Weight as of this encounter: 95.8 kg (211 lb 1.6 oz).  Cons: Appears well.  No apparent distress.  Psych: Alert. Oriented.  Mood and affect normal.  HEENT: PERRLA, EOMI, Eyes clear, moist mucus membranes, hearing intact  Resp: Normal effort.  No audible wheezing or stridor. equal symmetric chest expansion.  CV: Extremities warm and well perfused. no chest pain, no palpitations, no discernable arrhythmia  Well Perfused peripherally, palpable distal pulse  Abd:    No distention or guarding. no peritoneal signs  Skin: Warm. No visible lesions.no clubbing, no cyanosis  Neuro: Normal muscle tone.     Orthopedic Exam:   Right Knee Exam  Alignment:  right genu varus.  Inspection:  mild edema.  Palpation:  + tenderness. " "At medial jt line  ROM:  Normal knee ROM.  Strength:  5/5 quadriceps and hamstrings.  Stability:  No objective knee instability. Stable Varus / Valgus stress, Lachman, and Posterior drawer.  Tests:  No pertinent positive or negative tests.  Patella:  Patella tracks centrally with crepitus.  Neurovascular:  Sensation intact in DP/SP/Castaneda/Sa/T nerve distributions.  2+ DP & PT pulses.  Gait:  Normal.     Imaging/Studies:     Most recent images:     I have personally reviewed the imaging via [employer] PACS (Picture Archiving and Communication System) and my impression is as follows:    XR of right knee from 5/7/2024 showing medial joint space narrowing and osteophyte growths    Procedure:     Large joint arthrocentesis: R knee    Performed by: Edgar Romero DO  Authorized by: Edgar Romero DO    Universal Protocol:  Consent: Verbal consent obtained  Risks and benefits: risks, benefits and alternatives were discussed  Consent given by: patient  Time out: Immediately prior to procedure a \"time out\" was called to verify the correct patient, procedure, equipment, support staff and site/side marked as required.  Patient understanding: patient states understanding of the procedure being performed  Site marked: the operative site was marked  Patient identity confirmed: verbally with patient  Supporting Documentation  Indications: pain     Is this a Visco injection? NoProcedure Details  Location: knee - R knee  Preparation: Patient was prepped and draped in the usual sterile fashion  Needle size: 20 G  Ultrasound guidance: no  Approach: anteromedial  Medications administered: 2 mL bupivacaine 0.25 %; 2 mL lidocaine 1 %; 40 mg triamcinolone acetonide 40 mg/mL    Patient tolerance: patient tolerated the procedure well with no immediate complications  Dressing:  Sterile dressing applied          This Visit:     IRonaldo PA-C, scribed this note in conjunction with and in the presence of Dr. Edgar Romero DO, who performed " parts of the services as described in this documentation         [1]   Past Medical History:  Diagnosis Date    Acute saddle pulmonary embolism (HCC)     2021    Encephalitis     History of blood clot in brain 2002    Rheumatic disease     Rheumatoid arthritis (HCC)

## 2025-06-24 ENCOUNTER — HOSPITAL ENCOUNTER (OUTPATIENT)
Dept: BONE DENSITY | Facility: HOSPITAL | Age: 75
Discharge: HOME | End: 2025-06-24
Attending: INTERNAL MEDICINE
Payer: MEDICARE

## 2025-06-24 DIAGNOSIS — M85.89 DISAPPEARING BONE DISEASE: ICD-10-CM

## 2025-06-24 PROCEDURE — 77080 DXA BONE DENSITY AXIAL: CPT

## 2025-07-28 ENCOUNTER — APPOINTMENT (EMERGENCY)
Dept: CT IMAGING | Facility: HOSPITAL | Age: 75
End: 2025-07-28
Payer: MEDICARE

## 2025-07-28 ENCOUNTER — HOSPITAL ENCOUNTER (EMERGENCY)
Facility: HOSPITAL | Age: 75
Discharge: HOME | End: 2025-07-28
Attending: EMERGENCY MEDICINE | Admitting: EMERGENCY MEDICINE
Payer: MEDICARE

## 2025-07-28 VITALS
HEART RATE: 79 BPM | SYSTOLIC BLOOD PRESSURE: 173 MMHG | HEIGHT: 64 IN | OXYGEN SATURATION: 97 % | TEMPERATURE: 98 F | RESPIRATION RATE: 16 BRPM | DIASTOLIC BLOOD PRESSURE: 71 MMHG | WEIGHT: 205.25 LBS | BODY MASS INDEX: 35.04 KG/M2

## 2025-07-28 DIAGNOSIS — K86.9 PANCREATIC LESION: ICD-10-CM

## 2025-07-28 DIAGNOSIS — R07.81 RIB PAIN ON LEFT SIDE: Primary | ICD-10-CM

## 2025-07-28 LAB
ANION GAP SERPL CALCULATED.3IONS-SCNC: 5 MMOL/L (ref 4–13)
ATRIAL RATE: 63 BPM
BASOPHILS # BLD AUTO: 0.02 THOUSANDS/ÂΜL (ref 0–0.1)
BASOPHILS NFR BLD AUTO: 0 % (ref 0–1)
BUN SERPL-MCNC: 14 MG/DL (ref 5–25)
CALCIUM SERPL-MCNC: 9.7 MG/DL (ref 8.4–10.2)
CARDIAC TROPONIN I PNL SERPL HS: <2 NG/L (ref ?–50)
CHLORIDE SERPL-SCNC: 106 MMOL/L (ref 96–108)
CO2 SERPL-SCNC: 28 MMOL/L (ref 21–32)
CREAT SERPL-MCNC: 0.85 MG/DL (ref 0.6–1.3)
EOSINOPHIL # BLD AUTO: 0.06 THOUSAND/ÂΜL (ref 0–0.61)
EOSINOPHIL NFR BLD AUTO: 1 % (ref 0–6)
ERYTHROCYTE [DISTWIDTH] IN BLOOD BY AUTOMATED COUNT: 13.7 % (ref 11.6–15.1)
GFR SERPL CREATININE-BSD FRML MDRD: 68 ML/MIN/1.73SQ M
GLUCOSE SERPL-MCNC: 93 MG/DL (ref 65–140)
HCT VFR BLD AUTO: 41.9 % (ref 34.8–46.1)
HGB BLD-MCNC: 13.6 G/DL (ref 11.5–15.4)
IMM GRANULOCYTES # BLD AUTO: 0.02 THOUSAND/UL (ref 0–0.2)
IMM GRANULOCYTES NFR BLD AUTO: 0 % (ref 0–2)
LYMPHOCYTES # BLD AUTO: 1.22 THOUSANDS/ÂΜL (ref 0.6–4.47)
LYMPHOCYTES NFR BLD AUTO: 19 % (ref 14–44)
MCH RBC QN AUTO: 32.2 PG (ref 26.8–34.3)
MCHC RBC AUTO-ENTMCNC: 32.5 G/DL (ref 31.4–37.4)
MCV RBC AUTO: 99 FL (ref 82–98)
MONOCYTES # BLD AUTO: 0.75 THOUSAND/ÂΜL (ref 0.17–1.22)
MONOCYTES NFR BLD AUTO: 12 % (ref 4–12)
NEUTROPHILS # BLD AUTO: 4.33 THOUSANDS/ÂΜL (ref 1.85–7.62)
NEUTS SEG NFR BLD AUTO: 68 % (ref 43–75)
NRBC BLD AUTO-RTO: 0 /100 WBCS
P AXIS: 66 DEGREES
PLATELET # BLD AUTO: 199 THOUSANDS/UL (ref 149–390)
PMV BLD AUTO: 10.7 FL (ref 8.9–12.7)
POTASSIUM SERPL-SCNC: 3.9 MMOL/L (ref 3.5–5.3)
PR INTERVAL: 162 MS
QRS AXIS: -25 DEGREES
QRSD INTERVAL: 88 MS
QT INTERVAL: 408 MS
QTC INTERVAL: 417 MS
RBC # BLD AUTO: 4.23 MILLION/UL (ref 3.81–5.12)
SODIUM SERPL-SCNC: 139 MMOL/L (ref 135–147)
T WAVE AXIS: 38 DEGREES
VENTRICULAR RATE: 63 BPM
WBC # BLD AUTO: 6.4 THOUSAND/UL (ref 4.31–10.16)

## 2025-07-28 PROCEDURE — 84484 ASSAY OF TROPONIN QUANT: CPT | Performed by: EMERGENCY MEDICINE

## 2025-07-28 PROCEDURE — 80048 BASIC METABOLIC PNL TOTAL CA: CPT | Performed by: EMERGENCY MEDICINE

## 2025-07-28 PROCEDURE — 99284 EMERGENCY DEPT VISIT MOD MDM: CPT

## 2025-07-28 PROCEDURE — 99285 EMERGENCY DEPT VISIT HI MDM: CPT | Performed by: EMERGENCY MEDICINE

## 2025-07-28 PROCEDURE — 93005 ELECTROCARDIOGRAM TRACING: CPT

## 2025-07-28 PROCEDURE — 71275 CT ANGIOGRAPHY CHEST: CPT

## 2025-07-28 PROCEDURE — 93010 ELECTROCARDIOGRAM REPORT: CPT | Performed by: INTERNAL MEDICINE

## 2025-07-28 PROCEDURE — 85025 COMPLETE CBC W/AUTO DIFF WBC: CPT | Performed by: EMERGENCY MEDICINE

## 2025-07-28 PROCEDURE — 36415 COLL VENOUS BLD VENIPUNCTURE: CPT | Performed by: EMERGENCY MEDICINE

## 2025-07-28 RX ORDER — LIDOCAINE 50 MG/G
1 PATCH TOPICAL ONCE
Status: DISCONTINUED | OUTPATIENT
Start: 2025-07-28 | End: 2025-07-28 | Stop reason: HOSPADM

## 2025-07-28 RX ADMIN — IOHEXOL 85 ML: 350 INJECTION, SOLUTION INTRAVENOUS at 14:29

## 2025-07-28 RX ADMIN — LIDOCAINE 1 PATCH: 50 PATCH CUTANEOUS at 13:39

## 2025-08-01 DIAGNOSIS — I73.9 CLAUDICATION (HCC): ICD-10-CM

## 2025-08-01 DIAGNOSIS — R00.2 HEART PALPITATIONS: ICD-10-CM

## 2025-08-01 DIAGNOSIS — R60.1 GENERALIZED EDEMA: ICD-10-CM

## 2025-08-01 RX ORDER — POTASSIUM CHLORIDE 1500 MG/1
20 TABLET, EXTENDED RELEASE ORAL DAILY
Qty: 90 TABLET | Refills: 3 | Status: SHIPPED | OUTPATIENT
Start: 2025-08-01

## 2025-08-13 ENCOUNTER — HOSPITAL ENCOUNTER (OUTPATIENT)
Dept: MRI IMAGING | Facility: HOSPITAL | Age: 75
Discharge: HOME | End: 2025-08-13
Attending: FAMILY MEDICINE
Payer: MEDICARE

## 2025-08-13 DIAGNOSIS — K86.89 OTHER SPECIFIED DISEASES OF PANCREAS: ICD-10-CM

## 2025-08-13 PROCEDURE — 74183 MRI ABD W/O CNTR FLWD CNTR: CPT

## 2025-08-13 PROCEDURE — A9585 GADOBUTROL INJECTION: HCPCS | Performed by: FAMILY MEDICINE

## 2025-08-13 RX ORDER — GADOBUTROL 604.72 MG/ML
9 INJECTION INTRAVENOUS
Status: COMPLETED | OUTPATIENT
Start: 2025-08-13 | End: 2025-08-13

## 2025-08-13 RX ADMIN — GADOBUTROL 9 ML: 604.72 INJECTION INTRAVENOUS at 15:52
